# Patient Record
Sex: MALE | Race: WHITE | NOT HISPANIC OR LATINO | Employment: OTHER | URBAN - METROPOLITAN AREA
[De-identification: names, ages, dates, MRNs, and addresses within clinical notes are randomized per-mention and may not be internally consistent; named-entity substitution may affect disease eponyms.]

---

## 2018-04-12 LAB
INR PPP: 2.14 (ref 0.9–1.5)
PROTHROMBIN TIME (HISTORICAL): 25.2 SEC (ref 10.1–12.9)

## 2018-05-18 LAB
INR PPP: 2.27 (ref 0.9–1.5)
PROTHROMBIN TIME (HISTORICAL): 26.8 SEC (ref 10.1–12.9)

## 2018-06-18 LAB
25(OH)D3 SERPL-MCNC: 31.26 NG/ML
BASOPHILS # BLD AUTO: 0 X3/UL (ref 0–0.3)
BASOPHILS # BLD AUTO: 0.7 % (ref 0–2)
DEPRECATED RDW RBC AUTO: 15.4 % (ref 11.5–14.5)
EOSINOPHIL # BLD AUTO: 0.3 X3/UL (ref 0–0.5)
EOSINOPHIL NFR BLD AUTO: 5.1 % (ref 0–5)
HCT VFR BLD AUTO: 41.8 % (ref 42–52)
HGB BLD-MCNC: 14.1 G/DL (ref 14–18)
INR PPP: 2.14 (ref 0.9–1.5)
LYMPHOCYTES # BLD AUTO: 1.7 X3/UL (ref 1.2–4.2)
LYMPHOCYTES NFR BLD AUTO: 24.7 % (ref 20.5–51.1)
MCH RBC QN AUTO: 30.2 PG (ref 26–34)
MCHC RBC AUTO-ENTMCNC: 33.7 G/DL (ref 31–36)
MCV RBC AUTO: 89.5 FL (ref 81–99)
MONOCYTES # BLD AUTO: 0.8 X3/UL (ref 0–1)
MONOCYTES NFR BLD AUTO: 11.1 % (ref 1.7–12)
NEUTROPHILS # BLD AUTO: 4 X3/UL (ref 1.4–6.5)
NEUTS SEG NFR BLD AUTO: 58.4 % (ref 42.2–75.2)
PLATELET # BLD AUTO: 216 X3/UL (ref 130–400)
PMV BLD AUTO: 7.9 FL (ref 8.6–11.7)
PROTHROMBIN TIME (HISTORICAL): 25.2 SEC (ref 10.1–12.9)
RBC # BLD AUTO: 4.67 X6/UL (ref 4.3–5.9)
WBC # BLD AUTO: 6.9 X3/UL (ref 4.8–10.8)

## 2018-06-28 ENCOUNTER — TRANSCRIBE ORDERS (OUTPATIENT)
Dept: ADMINISTRATIVE | Facility: HOSPITAL | Age: 83
End: 2018-06-28

## 2018-06-28 ENCOUNTER — LAB (OUTPATIENT)
Dept: LAB | Facility: HOSPITAL | Age: 83
End: 2018-06-28
Payer: MEDICARE

## 2018-06-28 DIAGNOSIS — D68.9 COAGULOPATHY (HCC): Primary | ICD-10-CM

## 2018-06-28 DIAGNOSIS — D68.9 COAGULOPATHY (HCC): ICD-10-CM

## 2018-06-28 LAB
INR PPP: 1.45 (ref 0.9–1.5)
PROTHROMBIN TIME: 16.9 SECONDS (ref 10.1–12.9)

## 2018-06-28 PROCEDURE — 36415 COLL VENOUS BLD VENIPUNCTURE: CPT

## 2018-06-28 PROCEDURE — 85610 PROTHROMBIN TIME: CPT

## 2018-07-11 ENCOUNTER — TRANSCRIBE ORDERS (OUTPATIENT)
Dept: ADMINISTRATIVE | Facility: HOSPITAL | Age: 83
End: 2018-07-11

## 2018-07-11 DIAGNOSIS — S83.242A TEAR OF MEDIAL MENISCUS OF LEFT KNEE, UNSPECIFIED TEAR TYPE, UNSPECIFIED WHETHER OLD OR CURRENT TEAR, INITIAL ENCOUNTER: Primary | ICD-10-CM

## 2018-07-16 ENCOUNTER — HOSPITAL ENCOUNTER (OUTPATIENT)
Dept: MRI IMAGING | Facility: HOSPITAL | Age: 83
Discharge: HOME | End: 2018-07-16
Attending: ORTHOPAEDIC SURGERY
Payer: MEDICARE

## 2018-07-16 DIAGNOSIS — S83.242A TEAR OF MEDIAL MENISCUS OF LEFT KNEE, UNSPECIFIED TEAR TYPE, UNSPECIFIED WHETHER OLD OR CURRENT TEAR, INITIAL ENCOUNTER: ICD-10-CM

## 2018-07-16 PROCEDURE — 73721 MRI JNT OF LWR EXTRE W/O DYE: CPT

## 2018-08-01 ENCOUNTER — APPOINTMENT (OUTPATIENT)
Dept: LAB | Facility: HOSPITAL | Age: 83
End: 2018-08-01
Attending: ORTHOPAEDIC SURGERY
Payer: MEDICARE

## 2018-08-01 ENCOUNTER — HOSPITAL ENCOUNTER (OUTPATIENT)
Dept: RADIOLOGY | Facility: HOSPITAL | Age: 83
Discharge: HOME | End: 2018-08-01
Attending: ORTHOPAEDIC SURGERY
Payer: MEDICARE

## 2018-08-01 ENCOUNTER — HOSPITAL ENCOUNTER (OUTPATIENT)
Dept: NON INVASIVE DIAGNOSTICS | Facility: HOSPITAL | Age: 83
Discharge: HOME | End: 2018-08-01
Attending: ORTHOPAEDIC SURGERY
Payer: MEDICARE

## 2018-08-01 ENCOUNTER — TRANSCRIBE ORDERS (OUTPATIENT)
Dept: ADMINISTRATIVE | Facility: HOSPITAL | Age: 83
End: 2018-08-01

## 2018-08-01 DIAGNOSIS — S83.282A TEAR OF LATERAL MENISCUS OF LEFT KNEE, UNSPECIFIED TEAR TYPE, UNSPECIFIED WHETHER OLD OR CURRENT TEAR, INITIAL ENCOUNTER: ICD-10-CM

## 2018-08-01 DIAGNOSIS — S83.282A TEAR OF LATERAL MENISCUS OF LEFT KNEE, UNSPECIFIED TEAR TYPE, UNSPECIFIED WHETHER OLD OR CURRENT TEAR, INITIAL ENCOUNTER: Primary | ICD-10-CM

## 2018-08-01 LAB
ANION GAP SERPL CALCULATED.3IONS-SCNC: 7 MMOL/L (ref 4–13)
APTT PPP: 54 SECONDS (ref 24–36)
ATRIAL RATE: 64 BPM
BASOPHILS # BLD AUTO: 0 THOUSANDS/ΜL (ref 0–0.1)
BASOPHILS NFR BLD AUTO: 1 % (ref 0–2)
BILIRUB UR QL STRIP: NEGATIVE
BUN SERPL-MCNC: 16 MG/DL (ref 7–25)
CALCIUM SERPL-MCNC: 9.4 MG/DL (ref 8.6–10.5)
CHLORIDE SERPL-SCNC: 102 MMOL/L (ref 98–107)
CLARITY UR: ABNORMAL
CO2 SERPL-SCNC: 28 MMOL/L (ref 21–31)
COLOR UR: YELLOW
CREAT SERPL-MCNC: 0.99 MG/DL (ref 0.7–1.3)
EOSINOPHIL # BLD AUTO: 0.5 THOUSAND/ΜL (ref 0–0.61)
EOSINOPHIL NFR BLD AUTO: 6 % (ref 0–5)
ERYTHROCYTE [DISTWIDTH] IN BLOOD BY AUTOMATED COUNT: 14.7 % (ref 11.5–14.5)
GFR SERPL CREATININE-BSD FRML MDRD: 68 ML/MIN/1.73SQ M
GLUCOSE P FAST SERPL-MCNC: 98 MG/DL (ref 65–99)
GLUCOSE UR STRIP-MCNC: NEGATIVE MG/DL
HCT VFR BLD AUTO: 41 % (ref 36.5–49.3)
HGB BLD-MCNC: 13.7 G/DL (ref 14–18)
HGB UR QL STRIP.AUTO: NEGATIVE
INR PPP: 4.39 (ref 0.9–1.5)
KETONES UR STRIP-MCNC: ABNORMAL MG/DL
LEUKOCYTE ESTERASE UR QL STRIP: NEGATIVE
LYMPHOCYTES # BLD AUTO: 1.8 THOUSANDS/ΜL (ref 0.6–4.47)
LYMPHOCYTES NFR BLD AUTO: 23 % (ref 21–51)
MCH RBC QN AUTO: 30.1 PG (ref 26–34)
MCHC RBC AUTO-ENTMCNC: 33.5 G/DL (ref 31–37)
MCV RBC AUTO: 90 FL (ref 81–99)
MONOCYTES # BLD AUTO: 0.7 THOUSAND/ΜL (ref 0.17–1.22)
MONOCYTES NFR BLD AUTO: 9 % (ref 2–12)
NEUTROPHILS # BLD AUTO: 4.9 THOUSANDS/ΜL (ref 1.4–6.5)
NEUTS SEG NFR BLD AUTO: 62 % (ref 42–75)
NITRITE UR QL STRIP: NEGATIVE
NRBC BLD AUTO-RTO: 0 /100 WBCS
P AXIS: 76 DEGREES
PH UR STRIP.AUTO: 5.5 [PH] (ref 5–8)
PLATELET # BLD AUTO: 226 THOUSANDS/UL (ref 149–390)
PMV BLD AUTO: 7.5 FL (ref 8.6–11.7)
POTASSIUM SERPL-SCNC: 3.9 MMOL/L (ref 3.5–5.5)
PR INTERVAL: 266 MS
PROT UR STRIP-MCNC: NEGATIVE MG/DL
PROTHROMBIN TIME: 52.5 SECONDS (ref 10.1–12.9)
QRS AXIS: -23 DEGREES
QRSD INTERVAL: 94 MS
QT INTERVAL: 428 MS
QTC INTERVAL: 441 MS
RBC # BLD AUTO: 4.55 MILLION/UL (ref 4.3–5.9)
SODIUM SERPL-SCNC: 137 MMOL/L (ref 134–143)
SP GR UR STRIP.AUTO: 1.02 (ref 1–1.03)
T WAVE AXIS: 30 DEGREES
UROBILINOGEN UR QL STRIP.AUTO: 0.2 E.U./DL
VENTRICULAR RATE: 64 BPM
WBC # BLD AUTO: 7.8 THOUSAND/UL (ref 4.8–10.8)

## 2018-08-01 PROCEDURE — 81003 URINALYSIS AUTO W/O SCOPE: CPT | Performed by: ORTHOPAEDIC SURGERY

## 2018-08-01 PROCEDURE — 85610 PROTHROMBIN TIME: CPT

## 2018-08-01 PROCEDURE — 93005 ELECTROCARDIOGRAM TRACING: CPT

## 2018-08-01 PROCEDURE — 87086 URINE CULTURE/COLONY COUNT: CPT

## 2018-08-01 PROCEDURE — 85730 THROMBOPLASTIN TIME PARTIAL: CPT

## 2018-08-01 PROCEDURE — 85025 COMPLETE CBC W/AUTO DIFF WBC: CPT

## 2018-08-01 PROCEDURE — 80048 BASIC METABOLIC PNL TOTAL CA: CPT

## 2018-08-01 PROCEDURE — 71046 X-RAY EXAM CHEST 2 VIEWS: CPT

## 2018-08-01 PROCEDURE — 36415 COLL VENOUS BLD VENIPUNCTURE: CPT

## 2018-08-02 LAB — BACTERIA UR CULT: NORMAL

## 2018-08-13 RX ORDER — WARFARIN SODIUM 5 MG/1
5 TABLET ORAL DAILY
Status: ON HOLD | COMMUNITY
End: 2019-01-30

## 2018-08-13 RX ORDER — PREDNISOLONE ACETATE 10 MG/ML
1 SUSPENSION/ DROPS OPHTHALMIC DAILY
COMMUNITY

## 2018-08-13 RX ORDER — AMLODIPINE BESYLATE 10 MG/1
10 TABLET ORAL DAILY
COMMUNITY

## 2018-08-13 RX ORDER — ATROPINE SULFATE 10 MG/ML
1 SOLUTION/ DROPS OPHTHALMIC DAILY
COMMUNITY

## 2018-08-13 RX ORDER — TIMOLOL MALEATE 5 MG/ML
1 SOLUTION/ DROPS OPHTHALMIC 2 TIMES DAILY
COMMUNITY

## 2018-08-13 RX ORDER — ALENDRONATE SODIUM 70 MG/1
70 TABLET ORAL WEEKLY
Status: ON HOLD | COMMUNITY
End: 2018-08-15 | Stop reason: ALTCHOICE

## 2018-08-13 RX ORDER — NIACIN 750 MG/1
1500 TABLET, EXTENDED RELEASE ORAL
COMMUNITY
End: 2019-10-16 | Stop reason: HOSPADM

## 2018-08-13 RX ORDER — ATORVASTATIN CALCIUM 40 MG/1
40 TABLET, FILM COATED ORAL DAILY
COMMUNITY

## 2018-08-13 NOTE — PRE-PROCEDURE INSTRUCTIONS
Pre-Surgery Instructions:   Medication Instructions    timolol (TIMOPTIC) 0 5 % ophthalmic solution Instructed patient per Anesthesia Guidelines

## 2018-08-15 ENCOUNTER — HOSPITAL ENCOUNTER (OUTPATIENT)
Facility: HOSPITAL | Age: 83
Setting detail: OUTPATIENT SURGERY
Discharge: HOME | End: 2018-08-15
Attending: ORTHOPAEDIC SURGERY | Admitting: ORTHOPAEDIC SURGERY
Payer: MEDICARE

## 2018-08-15 ENCOUNTER — ANESTHESIA EVENT (OUTPATIENT)
Dept: PERIOP | Facility: HOSPITAL | Age: 83
End: 2018-08-15
Payer: MEDICARE

## 2018-08-15 ENCOUNTER — ANESTHESIA (OUTPATIENT)
Dept: PERIOP | Facility: HOSPITAL | Age: 83
End: 2018-08-15
Payer: MEDICARE

## 2018-08-15 VITALS
BODY MASS INDEX: 28.25 KG/M2 | HEART RATE: 80 BPM | RESPIRATION RATE: 18 BRPM | OXYGEN SATURATION: 93 % | WEIGHT: 180 LBS | SYSTOLIC BLOOD PRESSURE: 124 MMHG | TEMPERATURE: 96.8 F | HEIGHT: 67 IN | DIASTOLIC BLOOD PRESSURE: 62 MMHG

## 2018-08-15 DIAGNOSIS — S83.242D ACUTE MEDIAL MENISCUS TEAR OF LEFT KNEE, SUBSEQUENT ENCOUNTER: Primary | ICD-10-CM

## 2018-08-15 DIAGNOSIS — S83.272D COMPLEX TEAR OF LATERAL MENISCUS, CURRENT INJURY, LEFT KNEE, SUBSEQUENT ENCOUNTER: ICD-10-CM

## 2018-08-15 LAB
APTT PPP: 30 SECONDS (ref 24–36)
INR PPP: 1 (ref 0.9–1.5)
PROTHROMBIN TIME: 11.6 SECONDS (ref 10.1–12.9)

## 2018-08-15 PROCEDURE — 85730 THROMBOPLASTIN TIME PARTIAL: CPT | Performed by: ORTHOPAEDIC SURGERY

## 2018-08-15 PROCEDURE — 85610 PROTHROMBIN TIME: CPT | Performed by: ORTHOPAEDIC SURGERY

## 2018-08-15 PROCEDURE — 88304 TISSUE EXAM BY PATHOLOGIST: CPT | Performed by: PATHOLOGY

## 2018-08-15 RX ORDER — BUPIVACAINE HYDROCHLORIDE AND EPINEPHRINE 5; 5 MG/ML; UG/ML
INJECTION, SOLUTION PERINEURAL AS NEEDED
Status: DISCONTINUED | OUTPATIENT
Start: 2018-08-15 | End: 2018-08-15 | Stop reason: HOSPADM

## 2018-08-15 RX ORDER — MAGNESIUM HYDROXIDE 1200 MG/15ML
LIQUID ORAL AS NEEDED
Status: DISCONTINUED | OUTPATIENT
Start: 2018-08-15 | End: 2018-08-15 | Stop reason: HOSPADM

## 2018-08-15 RX ORDER — SODIUM CHLORIDE, SODIUM LACTATE, POTASSIUM CHLORIDE, CALCIUM CHLORIDE 600; 310; 30; 20 MG/100ML; MG/100ML; MG/100ML; MG/100ML
50 INJECTION, SOLUTION INTRAVENOUS CONTINUOUS
Status: DISCONTINUED | OUTPATIENT
Start: 2018-08-15 | End: 2018-08-15 | Stop reason: HOSPADM

## 2018-08-15 RX ORDER — ONDANSETRON 2 MG/ML
4 INJECTION INTRAMUSCULAR; INTRAVENOUS ONCE AS NEEDED
Status: DISCONTINUED | OUTPATIENT
Start: 2018-08-15 | End: 2018-08-15 | Stop reason: HOSPADM

## 2018-08-15 RX ORDER — PROPOFOL 10 MG/ML
INJECTION, EMULSION INTRAVENOUS AS NEEDED
Status: DISCONTINUED | OUTPATIENT
Start: 2018-08-15 | End: 2018-08-15 | Stop reason: SURG

## 2018-08-15 RX ORDER — METHYLPREDNISOLONE ACETATE 40 MG/ML
INJECTION, SUSPENSION INTRA-ARTICULAR; INTRALESIONAL; INTRAMUSCULAR; SOFT TISSUE AS NEEDED
Status: DISCONTINUED | OUTPATIENT
Start: 2018-08-15 | End: 2018-08-15 | Stop reason: HOSPADM

## 2018-08-15 RX ORDER — ONDANSETRON 2 MG/ML
INJECTION INTRAMUSCULAR; INTRAVENOUS AS NEEDED
Status: DISCONTINUED | OUTPATIENT
Start: 2018-08-15 | End: 2018-08-15 | Stop reason: SURG

## 2018-08-15 RX ORDER — LIDOCAINE HYDROCHLORIDE 10 MG/ML
INJECTION, SOLUTION INFILTRATION; PERINEURAL AS NEEDED
Status: DISCONTINUED | OUTPATIENT
Start: 2018-08-15 | End: 2018-08-15 | Stop reason: SURG

## 2018-08-15 RX ORDER — HYDROCODONE BITARTRATE AND ACETAMINOPHEN 5; 325 MG/1; MG/1
1 TABLET ORAL EVERY 6 HOURS PRN
Status: DISCONTINUED | OUTPATIENT
Start: 2018-08-15 | End: 2018-08-15 | Stop reason: HOSPADM

## 2018-08-15 RX ORDER — POVIDONE-IODINE 10 MG/G
OINTMENT TOPICAL AS NEEDED
Status: DISCONTINUED | OUTPATIENT
Start: 2018-08-15 | End: 2018-08-15 | Stop reason: HOSPADM

## 2018-08-15 RX ORDER — ONDANSETRON 2 MG/ML
4 INJECTION INTRAMUSCULAR; INTRAVENOUS EVERY 6 HOURS PRN
Status: DISCONTINUED | OUTPATIENT
Start: 2018-08-15 | End: 2018-08-15 | Stop reason: HOSPADM

## 2018-08-15 RX ORDER — FENTANYL CITRATE 50 UG/ML
INJECTION, SOLUTION INTRAMUSCULAR; INTRAVENOUS AS NEEDED
Status: DISCONTINUED | OUTPATIENT
Start: 2018-08-15 | End: 2018-08-15 | Stop reason: SURG

## 2018-08-15 RX ORDER — CEPHALEXIN 500 MG/1
500 CAPSULE ORAL EVERY 6 HOURS SCHEDULED
Refills: 0
Start: 2018-08-15 | End: 2018-08-18

## 2018-08-15 RX ORDER — LOSARTAN POTASSIUM 50 MG/1
TABLET ORAL DAILY
COMMUNITY

## 2018-08-15 RX ORDER — SODIUM CHLORIDE, SODIUM LACTATE, POTASSIUM CHLORIDE, CALCIUM CHLORIDE 600; 310; 30; 20 MG/100ML; MG/100ML; MG/100ML; MG/100ML
125 INJECTION, SOLUTION INTRAVENOUS CONTINUOUS
Status: DISCONTINUED | OUTPATIENT
Start: 2018-08-15 | End: 2018-08-15

## 2018-08-15 RX ORDER — EPHEDRINE SULFATE 50 MG/ML
INJECTION, SOLUTION INTRAVENOUS AS NEEDED
Status: DISCONTINUED | OUTPATIENT
Start: 2018-08-15 | End: 2018-08-15 | Stop reason: SURG

## 2018-08-15 RX ADMIN — FENTANYL CITRATE 50 MCG: 50 INJECTION INTRAMUSCULAR; INTRAVENOUS at 13:40

## 2018-08-15 RX ADMIN — CEFAZOLIN SODIUM 2000 MG: 2 SOLUTION INTRAVENOUS at 13:25

## 2018-08-15 RX ADMIN — LIDOCAINE HYDROCHLORIDE 60 MG: 10 INJECTION, SOLUTION INFILTRATION; PERINEURAL at 13:33

## 2018-08-15 RX ADMIN — EPHEDRINE SULFATE 5 MG: 50 INJECTION INTRAMUSCULAR; INTRAVENOUS; SUBCUTANEOUS at 13:51

## 2018-08-15 RX ADMIN — PROPOFOL 100 MG: 10 INJECTION, EMULSION INTRAVENOUS at 13:33

## 2018-08-15 RX ADMIN — PROPOFOL 50 MG: 10 INJECTION, EMULSION INTRAVENOUS at 13:34

## 2018-08-15 RX ADMIN — FENTANYL CITRATE 25 MCG: 50 INJECTION INTRAMUSCULAR; INTRAVENOUS at 14:05

## 2018-08-15 RX ADMIN — EPHEDRINE SULFATE 10 MG: 50 INJECTION INTRAMUSCULAR; INTRAVENOUS; SUBCUTANEOUS at 13:44

## 2018-08-15 RX ADMIN — FENTANYL CITRATE 25 MCG: 50 INJECTION INTRAMUSCULAR; INTRAVENOUS at 14:21

## 2018-08-15 RX ADMIN — SODIUM CHLORIDE, SODIUM LACTATE, POTASSIUM CHLORIDE, AND CALCIUM CHLORIDE: .6; .31; .03; .02 INJECTION, SOLUTION INTRAVENOUS at 12:50

## 2018-08-15 RX ADMIN — ONDANSETRON HYDROCHLORIDE 4 MG: 2 INJECTION, SOLUTION INTRAVENOUS at 14:08

## 2018-08-15 NOTE — OP NOTE
OPERATIVE REPORT  PATIENT NAME: Kamari Roa    :  3/5/1931  MRN: [id number]  Pt Location: [facility] OR ROOM 02    SURGERY DATE: 8/15/2018    Surgeon(s) and Role:     * Yazmin Purcell DO - Primary     * Salty Dillon PA-C - Assisting    Preop Diagnosis:  Complex tear of lateral meniscus, current injury, left knee, subsequent encounter [S83 272D]    Post-Op Diagnosis Codes:     * Complex tear of lateral meniscus, current injury, left knee, subsequent encounter [S83 272D]  Complex tear of lateral meniscus  Synovitis  Degenerative joint disease     Procedure(s) (LRB):  KNEE ARTHROSCOPY (Left)   Medial meniscectomy  Lateral meniscectomy  Chondroplasty  Synovectomy  Post arthroscopic injection of intra-articular joint space and peripheral portals    Specimen(s):  shavings    Estimated Blood Loss:   Minimal    Drains: none       Anesthesia Type:   LMA    Operative Indications:  Complex tear of lateral meniscus, current injury, left knee, subsequent encounter [S83 272D]      Operative Findings:  TT: 16    Complications:   None    Procedure and Technique:    The patient was properly identified and brought into the office operative suite  After  Successful induction of the general anesthetic, a tourniquet was placed on patient's left proximal thigh  The left lower extremity was then prepped and draped in the usual fashion  It is medically necessary that the physician's assistant be in the room to aid in positioning and placing  the appropriate amount of retraction on  the patient  The patient was also given a dose of intravenous antibiotics      An Esmarch was used to exsanguinate the left lower extremity  The tourniquet was then inflated  Using a 11  Scalpel blade an anterior lateral portal was made approximately 1 cm above the joint line 1 cm lateral to patellar tendon and an anterior medial portal was made approximately 1 cm above the joint line 1 cm medial to patellar tendon    The arthroscope was 1st introduced into the  into the lateral portal   First the  medial joint space was inspected  There was a tremendous amount of synovial tissue  A synovectomy did occur with these with assistance of a shaver  There was a complex tear along the central and posterior 1/3 horn of the medial meniscus  There was grade 3 arthritic changes along the medial femoral condyle  Using an an arthroscopic biter, a medial meniscectomy  occurred  It was then smoothed out with shaver  A Chondroplasty did occur where there was rough articular cartilage  It was then confirmed with a probe that there was no further loosening of the meniscus and articular cartilage  The arthroscope was then introduced in the lateral side of her knee  After a synovectomy was performed, there was a complex tear along the lateral meniscus  Using arthroscopic shaver a lateral meniscectomy did occur  It was then confirmed with a probe that there is no further loosening of the meniscal tissue  There is grade 3 arthritis on the lateral side of his knee where a chondroplasty occurred  The arthroscope was then induced the patellofemoral joint  There was some grade 2 and grade 3 early arthritic changes along the undersurface of the patella  Using arthroscopic shaver a chondroplasty to occur smoothing the articular cartilage down to a nice space  The medial and lateral gutters were inspected next and there was no loose bodies  The knee was then copiously irrigated sterile arthroscopic solution  The portals were closed with 3 O nylon  The portals were then injected with 0 5% Marcaine with epinephrine, and the knee was injected with 0 5% Marcaine with epinephrine and Depo-Medrol  The wounds were then dressed with ointment Xeroform 4x4s sterile Webril and Ace bandage  The tourniquet was then deflated  There is no complications during procedure  She was successfully woken transferred her hospital bed in with recurrent stable condition            I was present for the entire procedure    Patient Disposition:  PACU     SIGNATURE: Ryan Han,   DATE: August 15, 2018  TIME: 1:39 PM

## 2018-08-15 NOTE — ANESTHESIA PREPROCEDURE EVALUATION
Review of Systems/Medical History  Patient summary reviewed  Chart reviewed      Cardiovascular  EKG reviewed, Exercise tolerance (METS): >4,  Hyperlipidemia, Hypertension , CAD , CAD status: 1VD, History of CABG, Cardiac stents > 1 year    Pulmonary       GI/Hepatic            Endo/Other     GYN       Hematology   Musculoskeletal       Neurology   Psychology           Physical Exam    Airway       Dental   upper dentures,     Cardiovascular  Rhythm: regular, Rate: normal,     Pulmonary  Breath sounds clear to auscultation,     Other Findings        Anesthesia Plan  ASA Score- 3     Anesthesia Type- general with ASA Monitors  Additional Monitors:   Airway Plan: LMA  Comment: Took BP Meds today  Plan Factors-    Induction-     Postoperative Plan-     Informed Consent- Anesthetic plan and risks discussed with patient

## 2018-08-15 NOTE — ANESTHESIA POSTPROCEDURE EVALUATION
Post-Op Assessment Note      CV Status:  Stable    Mental Status:  Alert    Hydration Status:  Stable    PONV Controlled:  None    Airway Patency:  Patent    Post Op Vitals Reviewed: Yes          Staff: CRNA           BP   131/67   Temp   98 0   Pulse  75   Resp   12   SpO2   99

## 2018-08-17 ENCOUNTER — EVALUATION (OUTPATIENT)
Dept: PHYSICAL THERAPY | Facility: CLINIC | Age: 83
End: 2018-08-17
Payer: MEDICARE

## 2018-08-17 ENCOUNTER — TRANSCRIBE ORDERS (OUTPATIENT)
Dept: PHYSICAL THERAPY | Facility: CLINIC | Age: 83
End: 2018-08-17

## 2018-08-17 VITALS — DIASTOLIC BLOOD PRESSURE: 68 MMHG | SYSTOLIC BLOOD PRESSURE: 137 MMHG | HEART RATE: 78 BPM

## 2018-08-17 DIAGNOSIS — Z98.890 S/P LEFT KNEE ARTHROSCOPY: Primary | ICD-10-CM

## 2018-08-17 PROCEDURE — G8990 OTHER PT/OT CURRENT STATUS: HCPCS | Performed by: PHYSICAL THERAPIST

## 2018-08-17 PROCEDURE — 97535 SELF CARE MNGMENT TRAINING: CPT | Performed by: PHYSICAL THERAPIST

## 2018-08-17 PROCEDURE — 97140 MANUAL THERAPY 1/> REGIONS: CPT | Performed by: PHYSICAL THERAPIST

## 2018-08-17 PROCEDURE — G8991 OTHER PT/OT GOAL STATUS: HCPCS | Performed by: PHYSICAL THERAPIST

## 2018-08-17 PROCEDURE — 97110 THERAPEUTIC EXERCISES: CPT | Performed by: PHYSICAL THERAPIST

## 2018-08-17 PROCEDURE — 97161 PT EVAL LOW COMPLEX 20 MIN: CPT | Performed by: PHYSICAL THERAPIST

## 2018-08-17 NOTE — PROGRESS NOTES
PT Evaluation     Today's date: 2018  Patient name: Elif Little  : 3/5/1931  MRN: [id number]  Referring provider: Brett Luevano DO  Dx:   Encounter Diagnosis     ICD-10-CM    1  S/P left knee arthroscopy Z98 890                   Assessment  Impairments: abnormal gait, abnormal or restricted ROM, activity intolerance and impaired physical strength    Assessment details: Elif Little is a 80 y o  male who presents to outpatient PT with a  S/P left knee arthroscopy  (primary encounter diagnosis)  No further referral appears necessary at this time based upon examination results  Pt presents with decreased strength, ROM, balance, functional activity tolerance, and pain with movement in his left knee,  which is  limiting his ability to perform the aforementioned functional activities, Pt ambulates with an antalgic gait pattern and a RW at the time of his initial evaluation  Etiologic factors include repetitive poor body mechanics, and recent knee surgery  Prognosis is good given HEP compliance and PT 2-3/wk  Please contact me if you have any questions or recommendations  Thank you for the opportunity to share in  [address] [facility],[address] care  Understanding of Dx/Px/POC: good   Prognosis: good    Goals  1  In 4-6 weeks, patient will demonstrate a decrease in pain to 0/10 during functional activities  2  In 4-6 weeks, patient will demonstrate an increase in range of motion by 15 degrees  3  In 4-6 weeks, patient will demonstrate an increase in strength by 1/2 grade on MMT    1  In 6-8 weeks, patient will be independent with their home exercise program  2  In 6-8 weeks, patient will have zero limitations with strength  3  In 6-8 weeks, patient will have zero limitations with ROM  4  In 6-8 weeks, patient will have zero limitations with ambulation  5    In 6-8 weeks, patient will have zero limitations with stair negotiation    Plan  Patient would benefit from: skilled physical therapy  Planned therapy interventions: manual therapy, therapeutic exercise and home exercise program  Frequency: 3x week  Duration in weeks: 4  Treatment plan discussed with: patient  Plan details: Patient was provided a home exercise program and demonstrated an understanding of exercises  Patient was advised to stop performing home exercise program if symptoms increase or new complaints developed  Verbal understanding demonstrated regarding home exercise program instructions  Surgical incision(s) inspected  Incision(s) clean, dry and intact with no signs/symptoms of infection  Patient was educated on signs/symptoms of infection and was advised to contact MD immediately if suspect infection  Subjective Evaluation    History of Present Illness  Date of surgery: 8/15/2018  Mechanism of injury: The patient reports that he had surgery on his knee on 8/15, to clean out some arthritis, and repair a ligament that was torn     Pain  Current pain ratin  At best pain ratin  At worst pain rating: 3  Quality: sharp and knife-like  Relieving factors: medications  Aggravating factors: standing, walking and stair climbing    Social Support  Steps to enter house: no  Stairs in house: no   Lives in: [facility]  Lives with: alone    Employment status: not working  Hand dominance: right    Treatments  Previous treatment: medication  Current treatment: medication and physical therapy  Patient Goals  Patient goals for therapy: decreased pain, increased strength and increased motion          Objective     Active Range of Motion   Left Knee   Flexion: 85 degrees   Extension: -5 degrees     Right Knee   Flexion: 120 degrees   Extension: 0 degrees     Strength/Myotome Testing     Left Knee   Left knee flexion strength: NT   Left knee extension strength: NT     Right Knee   Flexion: 4+  Extension: 4+      Flowsheet Rows      Most Recent Value   PT/OT G-Codes   Current Score  33   Projected Score  56          Precautions:     Daily Treatment Diary     Manual  8/17            L HS              L Hip Flexor              L Gastroc             L Knee PROM flex/ext to tolerance               15'                Exercise Diary  8/17            Nu Step 10 min            Quad Set              Hip Add with Ball             Clamshell             Bridges              SLR 4 way              Step Up              SLS             HR/TR              Lunges              CP with LLLDE                                                                                                                                      Modalities

## 2018-08-17 NOTE — LETTER
2018    Juan Valdez DO  [address]  [address]  [facility] [address]    Patient: Tika Khan   YOB: 1931   Date of Visit: 2018     Encounter Diagnosis     ICD-10-CM    1  S/P left knee arthroscopy Z98 890        Dear Dr Mannie Martinez:    Please review the attached Plan of Care from [address] [facility] [facility]'s recent visit  Please verify that you agree therapy should continue by signing the attached document and sending it back to our office  If you have any questions or concerns, please don't hesitate to call  Sincerely,    Alfonso Hernández PT      Referring Provider:      I certify that I have read the below Plan of Care and certify the need for these services furnished under this plan of treatment while under my care  Juan Valdez DO  [address]  [address]: [phone]          PT Evaluation     Today's date: 2018  Patient name: Tika Khan  : 3/5/1931  MRN: [id number]  Referring provider: Sam Gramajo DO  Dx:   Encounter Diagnosis     ICD-10-CM    1  S/P left knee arthroscopy Z98 890                   Assessment  Impairments: abnormal gait, abnormal or restricted ROM, activity intolerance and impaired physical strength    Assessment details: Tika Khan is a 80 y o  male who presents to outpatient PT with a  S/P left knee arthroscopy  (primary encounter diagnosis)  No further referral appears necessary at this time based upon examination results  Pt presents with decreased strength, ROM, balance, functional activity tolerance, and pain with movement in his left knee,  which is  limiting his ability to perform the aforementioned functional activities, Pt ambulates with an antalgic gait pattern and a RW at the time of his initial evaluation  Etiologic factors include repetitive poor body mechanics, and recent knee surgery  Prognosis is good given HEP compliance and PT 2-3/wk  Please contact me if you have any questions or recommendations  Thank you for the opportunity to share in  [address] [facility],[address] care  Understanding of Dx/Px/POC: good   Prognosis: good    Goals  1  In 4-6 weeks, patient will demonstrate a decrease in pain to 0/10 during functional activities  2  In 4-6 weeks, patient will demonstrate an increase in range of motion by 15 degrees  3  In 4-6 weeks, patient will demonstrate an increase in strength by 1/2 grade on MMT    1  In 6-8 weeks, patient will be independent with their home exercise program  2  In 6-8 weeks, patient will have zero limitations with strength  3  In 6-8 weeks, patient will have zero limitations with ROM  4  In 6-8 weeks, patient will have zero limitations with ambulation  5  In 6-8 weeks, patient will have zero limitations with stair negotiation    Plan  Patient would benefit from: skilled physical therapy  Planned therapy interventions: manual therapy, therapeutic exercise and home exercise program  Frequency: 3x week  Duration in weeks: 4  Treatment plan discussed with: patient  Plan details: Patient was provided a home exercise program and demonstrated an understanding of exercises  Patient was advised to stop performing home exercise program if symptoms increase or new complaints developed  Verbal understanding demonstrated regarding home exercise program instructions  Surgical incision(s) inspected  Incision(s) clean, dry and intact with no signs/symptoms of infection  Patient was educated on signs/symptoms of infection and was advised to contact MD immediately if suspect infection  Subjective Evaluation    History of Present Illness  Date of surgery: 8/15/2018  Mechanism of injury: The patient reports that he had surgery on his knee on 8/15, to clean out some arthritis, and repair a ligament that was torn     Pain  Current pain ratin  At best pain ratin  At worst pain rating: 3  Quality: sharp and knife-like  Relieving factors: medications  Aggravating factors: standing, walking and stair climbing    Social Support  Steps to enter house: no  Stairs in house: no   Lives in: [facility]  Lives with: alone    Employment status: not working  Hand dominance: right    Treatments  Previous treatment: medication  Current treatment: medication and physical therapy  Patient Goals  Patient goals for therapy: decreased pain, increased strength and increased motion          Objective     Active Range of Motion   Left Knee   Flexion: 85 degrees   Extension: -5 degrees     Right Knee   Flexion: 120 degrees   Extension: 0 degrees     Strength/Myotome Testing     Left Knee   Left knee flexion strength: NT   Left knee extension strength: NT     Right Knee   Flexion: 4+  Extension: 4+      Flowsheet Rows      Most Recent Value   PT/OT G-Codes   Current Score  33   Projected Score  56          Precautions:     Daily Treatment Diary     Manual  8/17            L HS              L Hip Flexor              L Gastroc             L Knee PROM flex/ext to tolerance               15'                Exercise Diary  8/17            Nu Step 10 min            Quad Set              Hip Add with Ball             Clamshell             Bridges              SLR 4 way              Step Up              SLS             HR/TR              Lunges              CP with LLLDE                                                                                                                                      Modalities

## 2018-08-20 ENCOUNTER — OFFICE VISIT (OUTPATIENT)
Dept: PHYSICAL THERAPY | Facility: CLINIC | Age: 83
End: 2018-08-20
Payer: MEDICARE

## 2018-08-20 DIAGNOSIS — Z98.890 S/P LEFT KNEE ARTHROSCOPY: Primary | ICD-10-CM

## 2018-08-20 PROCEDURE — 97110 THERAPEUTIC EXERCISES: CPT

## 2018-08-20 PROCEDURE — 97140 MANUAL THERAPY 1/> REGIONS: CPT

## 2018-08-20 NOTE — PROGRESS NOTES
Daily Note     Today's date: 2018  Patient name: Erica Sheppard  : 3/5/1931  MRN: [id number]  Referring provider: Berenice Gutierrez DO  Dx:   Encounter Diagnosis     ICD-10-CM    1  S/P left knee arthroscopy Z98 890                   Subjective: Pt denies all pain and reports that he wants to get back to bowling  Objective: See treatment diary below  Daily Treatment Diary     Manual        L HS         L Hip Flexor         L Gastroc        L Knee PROM flex/ext to tolerance          15' 15'          Exercise Diary        Nu Step 10 min L4 x10'      Heel Slides  10" x10      Quad Set   10" x10      Hip Add with Ball  5" x20      Clamshell  5" x20 Blue TB      Bridges   5" x20      SLR 4 way   3x10 Flex      Step Up         SLS        HR/TR   x30      Lunges         CP with LLLDE                                                                                    Modalities                                     Assessment: Tolerated treatment well  Initiated multiple new exercises as outlined in pt POC  No exacerbation of pain  Patient demonstrated fatigue post treatment, exhibited good technique with therapeutic exercises and would benefit from continued PT      Plan: Continue per plan of care  Progress treatment as tolerated

## 2018-08-22 ENCOUNTER — OFFICE VISIT (OUTPATIENT)
Dept: PHYSICAL THERAPY | Facility: CLINIC | Age: 83
End: 2018-08-22
Payer: MEDICARE

## 2018-08-22 DIAGNOSIS — Z98.890 S/P LEFT KNEE ARTHROSCOPY: Primary | ICD-10-CM

## 2018-08-22 PROCEDURE — 97110 THERAPEUTIC EXERCISES: CPT

## 2018-08-22 PROCEDURE — 97140 MANUAL THERAPY 1/> REGIONS: CPT

## 2018-08-22 NOTE — PROGRESS NOTES
Daily Note     Today's date: 2018  Patient name: Radha Manirquez  : 3/5/1931  MRN: [id number]  Referring provider: Fouzia Watters DO  Dx:   Encounter Diagnosis     ICD-10-CM    1  S/P left knee arthroscopy Z98 890                   Subjective: "I was a little sore after the work out the other day "      Objective: See treatment diary below  Daily Treatment Diary     Manual       L HS         L Hip Flexor         L Gastroc        L Knee PROM flex/ext to tolerance          15' 15' 15'         Exercise Diary       Nu Step 10 min L4 x10' L4 x10'     Heel Slides  10" x10 10" x10     Quad Set   10" x10 10" x10     Hip Add with Ball  5" x20 5" x20     Clamshell  5" x20 Blue TB 5" x20 Blue Tb     Bridges   5" x20 5" x20     SLR 4 way   3x10 Flex 3x10 flex     Step Up    "B" x20 ea     SLS        HR/TR   x30 x30     Lunges         CP with LLLDE   10' with ankle prop                                                                                 Modalities                                   Assessment: Tolerated treatment well  Initiated step ups this date  Patient demonstrated fatigue post treatment, exhibited good technique with therapeutic exercises and would benefit from continued PT  No adverse to CP during LLLD stretch  Plan: Continue per plan of care  Progress treatment as tolerated

## 2018-08-23 ENCOUNTER — APPOINTMENT (OUTPATIENT)
Dept: LAB | Facility: HOSPITAL | Age: 83
End: 2018-08-23
Payer: MEDICARE

## 2018-08-23 ENCOUNTER — TRANSCRIBE ORDERS (OUTPATIENT)
Dept: ADMINISTRATIVE | Facility: HOSPITAL | Age: 83
End: 2018-08-23

## 2018-08-23 DIAGNOSIS — D68.9 COAGULOPATHY (HCC): ICD-10-CM

## 2018-08-23 DIAGNOSIS — D68.9 COAGULOPATHY (HCC): Primary | ICD-10-CM

## 2018-08-23 LAB
INR PPP: 1.55 (ref 0.9–1.5)
PROTHROMBIN TIME: 18.1 SECONDS (ref 10.1–12.9)

## 2018-08-23 PROCEDURE — 85610 PROTHROMBIN TIME: CPT

## 2018-08-23 PROCEDURE — 36415 COLL VENOUS BLD VENIPUNCTURE: CPT

## 2018-08-24 ENCOUNTER — OFFICE VISIT (OUTPATIENT)
Dept: PHYSICAL THERAPY | Facility: CLINIC | Age: 83
End: 2018-08-24
Payer: MEDICARE

## 2018-08-24 DIAGNOSIS — Z98.890 S/P LEFT KNEE ARTHROSCOPY: Primary | ICD-10-CM

## 2018-08-24 PROCEDURE — 97140 MANUAL THERAPY 1/> REGIONS: CPT | Performed by: PHYSICAL THERAPIST

## 2018-08-24 PROCEDURE — 97110 THERAPEUTIC EXERCISES: CPT | Performed by: PHYSICAL THERAPIST

## 2018-08-24 NOTE — PROGRESS NOTES
Daily Note     Today's date: 2018  Patient name: Pipo Ayala  : 3/5/1931  MRN: [id number]  Referring provider: Maikol Bishop DO  Dx:   Encounter Diagnosis     ICD-10-CM    1  S/P left knee arthroscopy Z98 890                   Subjective: " Pt reports that his knee feels good today "       Objective: See treatment diary below    Manual      L HS         L Hip Flexor         L Gastroc        L Knee PROM flex/ext to tolerance          15' 15' 15' 15'        Exercise Diary      Nu Step 10 min L4 x10' L4 x10' L4 x 10    Heel Slides  10" x10 10" x10     Quad Set   10" x10 10" x10 10''10x    Hip Add with Ball  5" x20 5" x20 5''20x    Clamshell  5" x20 Blue TB 5" x20 Blue Tb 5''20x blue    Bridges   5" x20 5" x20 5''20x    SLR 4 way   3x10 Flex 3x10 flex 3x10 flexion    Step Up    "B" x20 ea  B x20    SLS        HR/TR   x30 x30 x30    Lunges         CP with LLLDE   10' with ankle prop 10'     Mini Squat     3x10                                                                        Modalities                                       Assessment: Tolerated treatment well  Patient exhibited good technique with therapeutic exercises  Added mini squats this session, to help with lower extremity strengthening  Plan: Continue per plan of care

## 2018-08-27 ENCOUNTER — OFFICE VISIT (OUTPATIENT)
Dept: PHYSICAL THERAPY | Facility: CLINIC | Age: 83
End: 2018-08-27
Payer: MEDICARE

## 2018-08-27 DIAGNOSIS — Z98.890 S/P LEFT KNEE ARTHROSCOPY: Primary | ICD-10-CM

## 2018-08-27 PROCEDURE — 97140 MANUAL THERAPY 1/> REGIONS: CPT

## 2018-08-27 PROCEDURE — 97150 GROUP THERAPEUTIC PROCEDURES: CPT

## 2018-08-27 NOTE — PROGRESS NOTES
Daily Note     Today's date: 2018  Patient name: Juanjo Betancourt  : 3/5/1931  MRN: [id number]  Referring provider: Alcides Hartman DO  Dx:   Encounter Diagnosis     ICD-10-CM    1  S/P left knee arthroscopy Z98 890                   Subjective: Pt reports that he has a F/u with the Ortho and will get his stitched out on Thursday  Pt denies pain in the L knee  Objective: See treatment diary below    Manual     L HS         L Hip Flexor         L Gastroc        L Knee PROM flex/ext to tolerance          15' 15' 15' 15' 15'       Exercise Diary     Nu Step 10 min L4 x10' L4 x10' L4 x 10 L4x10   Heel Slides  10" x10 10" x10     Quad Set   10" x10 10" x10 10''10x 10"x10   Hip Add with Ball  5" x20 5" x20 5''20x 5"x20   Clamshell  5" x20 Blue TB 5" x20 Blue Tb 5''20x blue 5" x20 blue   Bridges   5" x20 5" x20 5''20x 5" x20   SLR 4 way   3x10 Flex 3x10 flex 3x10 flexion 3x10 flex   Step Up    "B" x20 ea  B x20 "B" x20   SLS        HR/TR   x30 x30 x30 x30   Lunges         CP with LLLDE   10' with ankle prop 10'  Pt defers   Mini Squat     3x10 3x10                                                                       Modalities                                     Assessment: Tolerated treatment well  PROM WNL of the L knee this date  Patient demonstrated fatigue post treatment, exhibited good technique with therapeutic exercises and would benefit from continued PT  Pt defers CP this date  Plan: Continue per plan of care  Progress treatment as tolerated

## 2018-08-29 ENCOUNTER — OFFICE VISIT (OUTPATIENT)
Dept: PHYSICAL THERAPY | Facility: CLINIC | Age: 83
End: 2018-08-29
Payer: MEDICARE

## 2018-08-29 DIAGNOSIS — Z98.890 S/P LEFT KNEE ARTHROSCOPY: Primary | ICD-10-CM

## 2018-08-29 PROCEDURE — 97140 MANUAL THERAPY 1/> REGIONS: CPT

## 2018-08-29 PROCEDURE — 97110 THERAPEUTIC EXERCISES: CPT

## 2018-08-29 NOTE — PROGRESS NOTES
Daily Note     Today's date: 2018  Patient name: Reece Mendoza  : 3/5/1931  MRN: [id number]  Referring provider: Emery Sigala DO  Dx:   Encounter Diagnosis     ICD-10-CM    1  S/P left knee arthroscopy Z98 890                   Subjective: Pt reports that the "stiches are getting itchy but they come out tomorrow "       Objective: See treatment diary below    Manual     L HS         L Hip Flexor         L Gastroc        L Knee PROM flex/ext to tolerance          15' 15' 15' 15' 15'       Exercise Diary     Nu Step L5 x10' L4 x10' L4 x10' L4 x 10 L4x10   Heel Slides  10" x10 10" x10     Quad Set  10" x10 10" x10 10" x10 10''10x 10"x10   Hip Add with Ball 5" x20 5" x20 5" x20 5''20x 5"x20   Clamshell 5" x20 Blue TB 5" x20 Blue TB 5" x20 Blue Tb 5''20x blue 5" x20 blue   Bridges  5" x20 5" x20 5" x20 5''20x 5" x20   SLR 4 way  3x10 Flex 1# 3x10 Flex 3x10 flex 3x10 flexion 3x10 flex   Step Up  "C" x20 ea  "B" x20 ea  B x20 "B" x20   SLS 30" x3 ea       HR/TR  x30 x30 x30 x30 x30   Lunges         CP with LLLDE 10' with ankle prop  10' with ankle prop 10'  Pt defers   Mini Squat  3x10   3x10 3x10   Tmill walking 5' @ 1 3 MPH cueing on proper gait pattern               HS curl NV       Knee extension  NV                                           Modalities                                     Assessment: Tolerated treatment well  Slightly limited in TKE during PROM, resumed LLLD stretch  Initiated Tmill walking to correct gait pattern  Patient demonstrated fatigue post treatment, exhibited good technique with therapeutic exercises and would benefit from continued PT  Pt has no adverse affect to CP this date  Plan: Continue per plan of care  Progress treatment as tolerated  Initiate weighted machine NV

## 2018-08-31 ENCOUNTER — OFFICE VISIT (OUTPATIENT)
Dept: PHYSICAL THERAPY | Facility: CLINIC | Age: 83
End: 2018-08-31
Payer: MEDICARE

## 2018-08-31 DIAGNOSIS — Z98.890 S/P LEFT KNEE ARTHROSCOPY: Primary | ICD-10-CM

## 2018-08-31 PROCEDURE — 97110 THERAPEUTIC EXERCISES: CPT | Performed by: PHYSICAL THERAPIST

## 2018-08-31 PROCEDURE — 97140 MANUAL THERAPY 1/> REGIONS: CPT | Performed by: PHYSICAL THERAPIST

## 2018-08-31 NOTE — PROGRESS NOTES
Daily Note     Today's date: 2018  Patient name: Tika Khan  : 3/5/1931  MRN: [id number]  Referring provider: Sam Gramajo DO  Dx:   Encounter Diagnosis     ICD-10-CM    1  S/P left knee arthroscopy Z98 890                   Subjective: " I got my stitches out yesterday, my knee feels good"       Objective: See treatment diary below  Manual     L HS         L Hip Flexor         L Gastroc        L Knee PROM flex/ext to tolerance          15' 15' 15' 15' 15'       Exercise Diary     Nu Step L5 x10' L4 x10' L4 x10' L4 x 10 L4x10   Heel Slides  10" x10 10" x10     Quad Set  10" x10 10" x10 10" x10 10''10x 10"x10   Hip Add with Ball 5" x20 5" x20 5" x20 5''20x 5"x20   Clamshell 5" x20 Blue TB 5" x20 Blue TB 5" x20 Blue Tb 5''20x blue 5" x20 blue   Bridges  5" x20 5" x20 5" x20 5''20x 5" x20   SLR 4 way  3x10 Flex 1# 3x10 Flex 3x10 flex 3x10 flexion 3x10 flex   Step Up  "C" x20 ea "C" x20 each  "B" x20 ea  B x20 "B" x20   SLS 30" x3 ea 30''3x       HR/TR  x30 x30 x30 x30 x30   Lunges         CP with LLLDE 10' with ankle prop  10' with ankle prop 10'  Pt defers   Mini Squat  3x10 3x10   3x10 3x10   Tmill walking 5' @ 1 3 MPH cueing on proper gait pattern 5' @ 1 3 MPH cueing on proper gait pattern              HS curl NV NV      Knee extension  NV NV                                          Modalities                                     Assessment: Tolerated treatment well  Patient exhibited good technique with therapeutic exercises  Will add gym exercises next session, to help with lower extremity strengthening  Plan: Continue per plan of care

## 2018-09-05 ENCOUNTER — OFFICE VISIT (OUTPATIENT)
Dept: PHYSICAL THERAPY | Facility: CLINIC | Age: 83
End: 2018-09-05
Payer: MEDICARE

## 2018-09-05 DIAGNOSIS — Z98.890 S/P LEFT KNEE ARTHROSCOPY: Primary | ICD-10-CM

## 2018-09-05 PROCEDURE — 97110 THERAPEUTIC EXERCISES: CPT | Performed by: PHYSICAL THERAPIST

## 2018-09-05 PROCEDURE — 97140 MANUAL THERAPY 1/> REGIONS: CPT | Performed by: PHYSICAL THERAPIST

## 2018-09-05 NOTE — PROGRESS NOTES
Daily Note     Today's date: 2018  Patient name: Harpal Carpenter  : 3/5/1931  MRN: [id number]  Referring provider: Claritza Jameson DO  Dx:   Encounter Diagnosis     ICD-10-CM    1  S/P left knee arthroscopy Z98 890                   Subjective: Pt reports that he is doing well today      Objective: See treatment diary below    Manual     L HS         L Hip Flexor         L Gastroc        L Knee PROM flex/ext to tolerance          15' 15' 15' 15' 15'       Exercise Diary     Nu Step L5 x10' L4 x10' L4 x10' L4 x 10 L4x10   Heel Slides  10" x10 10" x10     Quad Set  10" x10 10" x10 10" x10 10''10x 10"x10   Hip Add with Ball 5" x20 5" x20 5" x20 5''20x 5"x20   Clamshell 5" x20 Blue TB 5" x20 Blue TB 5" x20 Blue Tb 5''20x blue 5" x20 blue   Bridges  5" x20 5" x20 5" x20 5''20x 5" x20   SLR 4 way  3x10 Flex 1# 3x10 Flex 3x10 flex 1 5   3x10 Abduction  3x10 flexion 3x10 flex   Step Up  "C" x20 ea "C" x20 each  "B" x20 ea  B x20 "B" x20   SLS 30" x3 ea 30''3x  30''3x     HR/TR  x30 x30 x30 x30 x30   Lunges         CP with LLLDE 10' with ankle prop  10' with ankle prop 10'  Pt defers   Mini Squat  3x10 3x10  3x10 3x10 3x10   Tmill walking 5' @ 1 3 MPH cueing on proper gait pattern 5' @ 1 3 MPH cueing on proper gait pattern              HS curl NV NV      Knee extension  NV NV                                          Modalities                                   Assessment: Tolerated treatment well  Patient exhibited good technique with therapeutic exercises  Progressed SLR flexion exercises to 1 5# ankle weights this session, to help with lower extremity strengthening  Plan: Continue per plan of care

## 2018-09-07 ENCOUNTER — OFFICE VISIT (OUTPATIENT)
Dept: PHYSICAL THERAPY | Facility: CLINIC | Age: 83
End: 2018-09-07
Payer: MEDICARE

## 2018-09-07 DIAGNOSIS — Z98.890 S/P LEFT KNEE ARTHROSCOPY: Primary | ICD-10-CM

## 2018-09-07 PROCEDURE — 97110 THERAPEUTIC EXERCISES: CPT

## 2018-09-07 PROCEDURE — 97150 GROUP THERAPEUTIC PROCEDURES: CPT

## 2018-09-07 PROCEDURE — 97140 MANUAL THERAPY 1/> REGIONS: CPT

## 2018-09-07 NOTE — PROGRESS NOTES
Daily Note     Today's date: 2018  Patient name: Argenis Cruz  : 3/5/1931  MRN: [id number]  Referring provider: Stacie Pritchard DO  Dx:   Encounter Diagnosis     ICD-10-CM    1  S/P left knee arthroscopy Z98 890                   Subjective: Pt offers no new comments and denies all pain  Objective: See treatment diary below  Manual     L HS         L Hip Flexor         L Gastroc        L Knee PROM flex/ext to tolerance          15' 15' 15' 15' 15'       Exercise Diary     Nu Step L5 x10' L4 x10' L4 x10' L4 x 10 L4x10   Heel Slides  10" x10 10" x10     Quad Set  10" x10 10" x10 10" x10 10''10x 10"x10   Hip Add with Ball 5" x20 5" x20 5" x20 5''20x 5"x20   Clamshell 5" x20 Blue TB 5" x20 Blue TB 5" x20 Blue Tb 5''20x blue 5" x20 blue   Bridges  5" x20 5" x20 5" x20 5''20x 5" x20   SLR 4 way  3x10 Flex 1# 3x10 Flex 3x10 flex 1 5   3x10 Abduction   3x10 flex   Step Up  "C" x20 ea "C" x20 each  "B" x20 ea  "C" x20 "B" x20   SLS 30" x3 ea 30''3x  30''3x 30"x3    HR/TR  x30 x30 x30 x30 x30   Lunges         CP with LLLDE 10' with ankle prop  10' with ankle prop  Pt defers   Mini Squat  3x10 3x10  3x10 3x10 3x10   Tmill walking 5' @ 1 3 MPH cueing on proper gait pattern 5' @ 1 3 MPH cueing on proper gait pattern  5' @ 2 0 MPH cueing on proper gait pattern            HS curl NV NV      Knee extension  NV NV                                          Modalities                                     Assessment: Tolerated treatment well  Pt has PROM of the L knee WNL  Patient exhibited good technique with therapeutic exercises and would benefit from continued PT      Plan: Progress note during next visit  Potential discharge next visit

## 2018-09-10 ENCOUNTER — TRANSCRIBE ORDERS (OUTPATIENT)
Dept: PHYSICAL THERAPY | Facility: CLINIC | Age: 83
End: 2018-09-10

## 2018-09-10 ENCOUNTER — EVALUATION (OUTPATIENT)
Dept: PHYSICAL THERAPY | Facility: CLINIC | Age: 83
End: 2018-09-10
Payer: MEDICARE

## 2018-09-10 DIAGNOSIS — Z98.890 S/P LEFT KNEE ARTHROSCOPY: Primary | ICD-10-CM

## 2018-09-10 PROCEDURE — 97140 MANUAL THERAPY 1/> REGIONS: CPT | Performed by: PHYSICAL THERAPIST

## 2018-09-10 PROCEDURE — 97110 THERAPEUTIC EXERCISES: CPT | Performed by: PHYSICAL THERAPIST

## 2018-09-10 PROCEDURE — G8990 OTHER PT/OT CURRENT STATUS: HCPCS | Performed by: PHYSICAL THERAPIST

## 2018-09-10 PROCEDURE — G8991 OTHER PT/OT GOAL STATUS: HCPCS | Performed by: PHYSICAL THERAPIST

## 2018-09-10 PROCEDURE — 97164 PT RE-EVAL EST PLAN CARE: CPT | Performed by: PHYSICAL THERAPIST

## 2018-09-10 NOTE — LETTER
September 10, 2018    Eliezer Huber DO  [address]  [address] [facility] [address]    Patient: William Rodney   YOB: 1931   Date of Visit: 9/10/2018     Encounter Diagnosis     ICD-10-CM    1  S/P left knee arthroscopy Z98 890        Dear Dr Angela Mathur:    Please review the attached Plan of Care from [address] [facility],[address] [facility]'s recent visit  Please verify that you agree therapy should continue by signing the attached document and sending it back to our office  If you have any questions or concerns, please don't hesitate to call  Sincerely,    Alondra Navas, PT      Referring Provider:      I certify that I have read the below Plan of Care and certify the need for these services furnished under this plan of treatment while under my care  Eliezer Huber DO  [address]  [address] [facility] [address]  [address] [facility] [address]: [phone]          PT Re-Evaluation       Assessment  Impairments: abnormal gait, abnormal or restricted ROM, activity intolerance and impaired physical strength    Assessment details: William Rodney has demonstrated decreased pain, increased strength, increased range of motion, and increased activity tolerance since starting physical therapy services  They report an overall improvement of 80% thus far  Pt is ambulating with no AD at this time, but still displays an antalgic gait and a decreased miguel, velocity and step length  He continues to present with decreased strength, decreased range of motion, and decreased activity tolerance and would benefit from additional skilled physical therapy interventions to address impairments and maximize function  Prognosis: good    Goals  1  In 4-6 weeks, patient will demonstrate a decrease in pain to 0/10 during functional activities  Met   2  In 4-6 weeks, patient will demonstrate an increase in range of motion by 15 degrees  Met  3   In 4-6 weeks, patient will demonstrate an increase in strength by 1/2 grade on MMT  Met     1  In 6-8 weeks, patient will be independent with their home exercise program  Met   2  In 6-8 weeks, patient will have zero limitations with strength  Partially Met   3  In 6-8 weeks, patient will have zero limitations with ROM  Partially Met   4  In 6-8 weeks, patient will have zero limitations with ambulation  Partially Met   5  In 6-8 weeks, patient will have zero limitations with stair negotiation  Partially Met      Plan  Patient would benefit from: skilled physical therapy  Planned therapy interventions: manual therapy, therapeutic exercise and home exercise program  Frequency: 3x week  Duration in weeks: 4  Treatment plan discussed with: patient  Plan details: Patient was provided a home exercise program and demonstrated an understanding of exercises  Patient was advised to stop performing home exercise program if symptoms increase or new complaints developed  Verbal understanding demonstrated regarding home exercise program instructions  Surgical incision(s) inspected  Incision(s) clean, dry and intact with no signs/symptoms of infection  Patient was educated on signs/symptoms of infection and was advised to contact MD immediately if suspect infection  Subjective Evaluation    History of Present Illness  Date of surgery: 8/15/2018  Mechanism of injury:  The patient reports he feels about 80 percent better since beginning physical therapy  He reports no difficulty with walking, stairs, standing, ADL's, and IADL's  His biggest issue at this point is that he wants to continue to work on improving the strength in the left knee         Pain                                       9/10   Current pain ratin             0  At best pain ratin              0  At worst pain rating: 3            0  Quality: sharp and knife-like  Relieving factors: medications  Aggravating factors: standing, walking and stair climbing    Social Support  Steps to enter house: no  Stairs in house: no   Lives in: [place]  Lives with: alone    Employment status: not working  Hand dominance: right    Treatments  Previous treatment: medication  Current treatment: medication and physical therapy  Patient Goals  Patient goals for therapy: decreased pain, increased strength and increased motion          Objective     Active Range of Motion   Left Knee                       9/10  Flexion: 85 degrees       124  Degrees   Extension: -5 degrees     0    Degrees     Right Knee   Flexion: 120 degrees   Extension: 0 degrees     Strength/Myotome Testing     Left Knee                                        9/10   Left knee flexion strength: NT         4/5  Left knee extension strength: NT    4/5    Right Knee   Flexion: 4+  Extension: 4+      Manual  8/29 8/31  9/5 9/7 9/10   L HS         L Hip Flexor         L Gastroc        L Knee PROM flex/ext to tolerance          15' 15' 15' 15' 15'       Exercise Diary  8/29 8/31 9/5 9/7 9/10   Nu Step L5 x10' L4 x10' L4 x10' L4 x 10 L4x10   Heel Slides  10" x10 10" x10     Quad Set  10" x10 10" x10 10" x10 10''10x 10"x10   Hip Add with Ball 5" x20 5" x20 5" x20 5''20x 5"x20   Clamshell 5" x20 Blue TB 5" x20 Blue TB 5" x20 Blue Tb 5''20x blue 5" x20 blue   Bridges  5" x20 5" x20 5" x20 5''20x 5" x20   SLR 4 way  3x10 Flex 1# 3x10 Flex 3x10 flex 1 5   3x10 Abduction   3x10 flex   Step Up  "C" x20 ea "C" x20 each  "B" x20 ea  "C" x20 "B" x20   SLS 30" x3 ea 30''3x  30''3x 30"x3    HR/TR  x30 x30 x30 x30 x30   Lunges         CP with LLLDE 10' with ankle prop  10' with ankle prop  10   Mini Squat  3x10 3x10  3x10 3x10 3x10   Tmill walking 5' @ 1 3 MPH cueing on proper gait pattern 5' @ 1 3 MPH cueing on proper gait pattern  5' @ 2 0 MPH cueing on proper gait pattern            HS curl NV NV   15# 3x10   Knee extension  NV NV   20# 3x10    Leg Press      80# 3x10                                Modalities

## 2018-09-10 NOTE — PROGRESS NOTES
PT Re-Evaluation       Assessment  Impairments: abnormal gait, abnormal or restricted ROM, activity intolerance and impaired physical strength    Assessment details: Patti Chow has demonstrated decreased pain, increased strength, increased range of motion, and increased activity tolerance since starting physical therapy services  They report an overall improvement of 80% thus far  Pt is ambulating with no AD at this time, but still displays an antalgic gait and a decreased miguel, velocity and step length  He continues to present with decreased strength, decreased range of motion, and decreased activity tolerance and would benefit from additional skilled physical therapy interventions to address impairments and maximize function  Prognosis: good    Goals  1  In 4-6 weeks, patient will demonstrate a decrease in pain to 0/10 during functional activities  Met   2  In 4-6 weeks, patient will demonstrate an increase in range of motion by 15 degrees  Met  3  In 4-6 weeks, patient will demonstrate an increase in strength by 1/2 grade on MMT  Met     1  In 6-8 weeks, patient will be independent with their home exercise program  Met   2  In 6-8 weeks, patient will have zero limitations with strength  Partially Met   3  In 6-8 weeks, patient will have zero limitations with ROM  Partially Met   4  In 6-8 weeks, patient will have zero limitations with ambulation  Partially Met   5  In 6-8 weeks, patient will have zero limitations with stair negotiation  Partially Met      Plan  Patient would benefit from: skilled physical therapy  Planned therapy interventions: manual therapy, therapeutic exercise and home exercise program  Frequency: 3x week  Duration in weeks: 4  Treatment plan discussed with: patient  Plan details: Patient was provided a home exercise program and demonstrated an understanding of exercises    Patient was advised to stop performing home exercise program if symptoms increase or new complaints developed  Verbal understanding demonstrated regarding home exercise program instructions  Surgical incision(s) inspected  Incision(s) clean, dry and intact with no signs/symptoms of infection  Patient was educated on signs/symptoms of infection and was advised to contact MD immediately if suspect infection  Subjective Evaluation    History of Present Illness  Date of surgery: 8/15/2018  Mechanism of injury:  The patient reports he feels about 80 percent better since beginning physical therapy  He reports no difficulty with walking, stairs, standing, ADL's, and IADL's  His biggest issue at this point is that he wants to continue to work on improving the strength in the left knee         Pain                                       9/10   Current pain ratin             0  At best pain ratin              0  At worst pain rating: 3            0  Quality: sharp and knife-like  Relieving factors: medications  Aggravating factors: standing, walking and stair climbing    Social Support  Steps to enter house: no  Stairs in house: no   Lives in: [place]  Lives with: alone    Employment status: not working  Hand dominance: right    Treatments  Previous treatment: medication  Current treatment: medication and physical therapy  Patient Goals  Patient goals for therapy: decreased pain, increased strength and increased motion          Objective     Active Range of Motion   Left Knee                       9/10  Flexion: 85 degrees       124  Degrees   Extension: -5 degrees     0    Degrees     Right Knee   Flexion: 120 degrees   Extension: 0 degrees     Strength/Myotome Testing     Left Knee                                        9/10   Left knee flexion strength: NT         4/5  Left knee extension strength: NT    4/5    Right Knee   Flexion: 4+  Extension: 4+      Manual  8/29 8/31  9/5 9/7 9/10   L HS         L Hip Flexor         L Gastroc        L Knee PROM flex/ext to tolerance          15' 15' 15' 15' 15'       Exercise Diary  8/29 8/31 9/5 9/7 9/10   Nu Step L5 x10' L4 x10' L4 x10' L4 x 10 L4x10   Heel Slides  10" x10 10" x10     Quad Set  10" x10 10" x10 10" x10 10''10x 10"x10   Hip Add with Ball 5" x20 5" x20 5" x20 5''20x 5"x20   Clamshell 5" x20 Blue TB 5" x20 Blue TB 5" x20 Blue Tb 5''20x blue 5" x20 blue   Bridges  5" x20 5" x20 5" x20 5''20x 5" x20   SLR 4 way  3x10 Flex 1# 3x10 Flex 3x10 flex 1 5   3x10 Abduction   3x10 flex   Step Up  "C" x20 ea "C" x20 each  "B" x20 ea  "C" x20 "B" x20   SLS 30" x3 ea 30''3x  30''3x 30"x3    HR/TR  x30 x30 x30 x30 x30   Lunges         CP with LLLDE 10' with ankle prop  10' with ankle prop  10   Mini Squat  3x10 3x10  3x10 3x10 3x10   Tmill walking 5' @ 1 3 MPH cueing on proper gait pattern 5' @ 1 3 MPH cueing on proper gait pattern  5' @ 2 0 MPH cueing on proper gait pattern            HS curl NV NV   15# 3x10   Knee extension  NV NV   20# 3x10    Leg Press      80# 3x10                                Modalities

## 2018-09-12 ENCOUNTER — OFFICE VISIT (OUTPATIENT)
Dept: PHYSICAL THERAPY | Facility: CLINIC | Age: 83
End: 2018-09-12
Payer: MEDICARE

## 2018-09-12 DIAGNOSIS — Z98.890 S/P LEFT KNEE ARTHROSCOPY: Primary | ICD-10-CM

## 2018-09-12 PROCEDURE — 97140 MANUAL THERAPY 1/> REGIONS: CPT

## 2018-09-12 PROCEDURE — 97110 THERAPEUTIC EXERCISES: CPT

## 2018-09-12 NOTE — PROGRESS NOTES
Daily Note     Today's date: 2018  Patient name: Silvano Best  : 3/5/1931  MRN: [id number]  Referring provider: Kelsi Simon DO  Dx:   Encounter Diagnosis     ICD-10-CM    1  S/P left knee arthroscopy Z98 890                   Subjective: "The knee feels good "      Objective: See treatment diary below  Manual   9/10   L HS         L Hip Flexor         L Gastroc        L Knee PROM flex/ext to tolerance          15' 15' 15' 15' 15'       Exercise Diary   9/10   Nu Step L5 x10' L4 x10' L4 x10' L4 x 10 L4x10   Heel Slides  10" x10 10" x10     Quad Set   10" x10 10" x10 10''10x 10"x10   Hip Add with Ball 5" x20 5" x20 5" x20 5''20x 5"x20   Clamshell 5" x20 Blue TB 5" x20 Blue TB 5" x20 Blue Tb 5''20x blue 5" x20 blue   Bridges  5" x20 5" x20 5" x20 5''20x 5" x20   SLR 4 way  3x10 Flex 2# 3x10 Flex  3x10 flex 1 5   3x10 Abduction   3x10 flex   Step Up  "C" x20 ea "C" x20 each  "B" x20 ea  "C" x20 "B" x20   SLS 30" x3 ea 30''3x  30''3x 30"x3    HR/TR  x30 x30 x30 x30 x30   Lunges         CP with LLLDE   10' with ankle prop  10   Mini Squat  3x10 3x10  3x10 3x10 3x10   Tmill walking 5' @ 2 0 MPH cueing on proper gait pattern 5' @ 1 3 MPH cueing on proper gait pattern  5' @ 2 0 MPH cueing on proper gait pattern            HS curl 30# 3x10 NV   15# 3x10   Knee extension  30# 3x10 NV   20# 3x10    Leg Press  80# 3x10     80# 3x10                                Modalities                                     Assessment: Tolerated treatment well  Increased weight on multiple machines  P demonstrates good independence in the gym this date and PROM WNL  Patient demonstrated fatigue post treatment, exhibited good technique with therapeutic exercises and would benefit from continued PT  Plan: Potential discharge next visit 
Statement Selected

## 2018-09-13 ENCOUNTER — OFFICE VISIT (OUTPATIENT)
Dept: PHYSICAL THERAPY | Facility: CLINIC | Age: 83
End: 2018-09-13
Payer: MEDICARE

## 2018-09-13 DIAGNOSIS — Z98.890 S/P LEFT KNEE ARTHROSCOPY: Primary | ICD-10-CM

## 2018-09-13 PROCEDURE — 97140 MANUAL THERAPY 1/> REGIONS: CPT

## 2018-09-13 PROCEDURE — 97110 THERAPEUTIC EXERCISES: CPT

## 2018-09-13 NOTE — PROGRESS NOTES
Daily Note     Today's date: 2018  Patient name: Maria Del Carmen Eric  : 3/5/1931  MRN: [id number]  Referring provider: Tania Angelo DO  Dx:   Encounter Diagnosis     ICD-10-CM    1  S/P left knee arthroscopy Z98 890                   Subjective: Pt reports that he "feels fine " He is ready to get back to the gym  Objective: See treatment diary below  Manual  9/12 9/13 9/5 9/7 9/10   L HS         L Hip Flexor         L Gastroc        L Knee PROM flex/ext to tolerance          15' 15' 15' 15' 15'       Exercise Diary   9/10   Nu Step L5 x10' L4 x10' L4 x10' L4 x 10 L4x10   Heel Slides   10" x10     Quad Set    10" x10 10''10x 10"x10   Hip Add with Ball 5" x20 5" x20 5" x20 5''20x 5"x20   Clamshell 5" x20 Blue TB 5" x20 Blue TB 5" x20 Blue Tb 5''20x blue 5" x20 blue   Bridges  5" x20 5" x20 5" x20 5''20x 5" x20   SLR 4 way  3x10 Flex 2# 3x10 Flex 2# 3x10 flex 1 5   3x10 Abduction   3x10 flex   Step Up  "C" x20 ea "C" x20 each  "B" x20 ea  "C" x20 "B" x20   SLS 30" x3 ea 30''3x  30''3x 30"x3    HR/TR  x30 x30 x30 x30 x30   Lunges         CP with LLLDE   10' with ankle prop  10   Mini Squat  3x10 3x10  3x10 3x10 3x10   Tmill walking 5' @ 2 0 MPH cueing on proper gait pattern 5' @ 2 0 MPH cueing on proper gait pattern  5' @ 2 0 MPH cueing on proper gait pattern            HS curl 30# 3x10 30# 3x10    15# 3x10   Knee extension  30# 3x10 30# 3x10    20# 3x10    Leg Press  80# 3x10  80# 3x10    80# 3x10                                Modalities                                     Assessment: Tolerated treatment well  Pt has PROM of the L knee WNL and demonstrated good strength and independence in the gym  Patient exhibited good technique with therapeutic exercises      Plan: Pt to be D/C from skilled PT by DPT

## 2018-09-14 ENCOUNTER — APPOINTMENT (OUTPATIENT)
Dept: PHYSICAL THERAPY | Facility: CLINIC | Age: 83
End: 2018-09-14
Payer: MEDICARE

## 2018-10-16 ENCOUNTER — APPOINTMENT (OUTPATIENT)
Dept: LAB | Facility: HOSPITAL | Age: 83
End: 2018-10-16
Payer: MEDICARE

## 2018-10-16 ENCOUNTER — TRANSCRIBE ORDERS (OUTPATIENT)
Dept: ADMINISTRATIVE | Facility: HOSPITAL | Age: 83
End: 2018-10-16

## 2018-10-16 DIAGNOSIS — D68.9 COAGULOPATHY (HCC): Primary | ICD-10-CM

## 2018-10-16 DIAGNOSIS — D68.9 COAGULOPATHY (HCC): ICD-10-CM

## 2018-10-16 LAB
INR PPP: 2.94 (ref 0.9–1.5)
PROTHROMBIN TIME: 34.9 SECONDS (ref 10.1–12.9)

## 2018-10-16 PROCEDURE — 36415 COLL VENOUS BLD VENIPUNCTURE: CPT

## 2018-10-16 PROCEDURE — 85610 PROTHROMBIN TIME: CPT

## 2018-10-17 NOTE — PROGRESS NOTES
At this time, patient has achieved their maximum functional benefit from skilled physical therapy services and will be discharged to their [facility]  Patient is in agreement with the plan of care    As a result, patient is discharged from physical therapy

## 2018-10-26 ENCOUNTER — APPOINTMENT (OUTPATIENT)
Dept: LAB | Facility: HOSPITAL | Age: 83
End: 2018-10-26
Payer: MEDICARE

## 2018-10-26 DIAGNOSIS — D68.9 COAGULOPATHY (HCC): ICD-10-CM

## 2018-10-26 LAB
INR PPP: 2.05 (ref 0.9–1.5)
PROTHROMBIN TIME: 24.1 SECONDS (ref 10.1–12.9)

## 2018-10-26 PROCEDURE — 85610 PROTHROMBIN TIME: CPT

## 2018-10-26 PROCEDURE — 36415 COLL VENOUS BLD VENIPUNCTURE: CPT

## 2018-11-02 ENCOUNTER — OFFICE VISIT (OUTPATIENT)
Dept: SURGICAL ONCOLOGY | Facility: CLINIC | Age: 83
End: 2018-11-02
Payer: MEDICARE

## 2018-11-02 VITALS
BODY MASS INDEX: 29.66 KG/M2 | DIASTOLIC BLOOD PRESSURE: 68 MMHG | WEIGHT: 189 LBS | HEART RATE: 77 BPM | RESPIRATION RATE: 16 BRPM | HEIGHT: 67 IN | TEMPERATURE: 97.1 F | SYSTOLIC BLOOD PRESSURE: 130 MMHG

## 2018-11-02 DIAGNOSIS — C43.72 MALIGNANT MELANOMA OF LEFT FOOT (HCC): Primary | ICD-10-CM

## 2018-11-02 PROCEDURE — 99205 OFFICE O/P NEW HI 60 MIN: CPT | Performed by: SURGERY

## 2018-11-02 RX ORDER — TRAMADOL HYDROCHLORIDE 50 MG/1
50 TABLET ORAL EVERY 6 HOURS PRN
Qty: 10 TABLET | Refills: 0 | Status: ON HOLD | OUTPATIENT
Start: 2018-11-02 | End: 2019-01-23 | Stop reason: CLARIF

## 2018-11-02 NOTE — LETTER
November 2, 2018     [address]    Patient: Cata Mann   YOB: 1931   Date of Visit: 11/2/2018       Dear Dr Dawn Course: Thank you for referring Cata Mann to me for evaluation  Below are my notes for this consultation  If you have questions, please do not hesitate to call me  I look forward to following your patient along with you  Sincerely,        Rafael Fleischer, MD        CC: No Recipients  Rafael Fleischer, MD  11/2/2018 12:53 PM  Sign at close encounter               Surgical Oncology consult       [address]  3/5/1931  [id number]  [place] [address]    Chief Complaint   Patient presents with    Consult     Patient is here for consult about melanoma  Assessment/Plan:    No problem-specific Assessment & Plan notes found for this encounter  Diagnoses and all orders for this visit:    Malignant melanoma of left foot ([address] )  -     traMADol (ULTRAM) 50 mg tablet; Take 1 tablet (50 mg total) by mouth every 6 (six) hours as needed for moderate pain  -     NM lymphatic melanoma; Future  -     Ambulatory referral to Plastic Surgery; Future  -     Case request operating room: EXCISION WIDE LESION LOWER EXTREMITY;  sln bx left foot melanoma, cosurgery with plastic surgery; Standing  -     Ambulatory referral to Cardiology; Future  -     Basic metabolic panel; Future  -     APTT; Future  -     CBC and Platelet; Future  -     Protime-INR; Future  -     EKG 12 lead;  Future  -     XR chest pa & lateral; Future  -     Case request operating room: EXCISION WIDE LESION LOWER EXTREMITY;  sln bx left foot melanoma, cosurgery with plastic surgery    Other orders  -     Incentive spirometry; Standing  -     Insert and maintain IV line; Standing  -     Void On-Call to O R ; Standing  -     Place sequential compression device; Standing  -     Incentive spirometry; Standing  -     Insert and maintain IV line; Standing  -     Void On-Call to O R ; Standing  -     Place sequential compression device; Standing        Advance Care Planning/Advance Directives:  Discussed disease status, cancer treatment plans and/or cancer treatment goals with the patient  No history exists  History of Present Illness:  Patient is an 26-year-old man who over the last several months has noticed a brown pigmented lesion which has been present for most of his life on his left lateral dorsal foot start to change appearance and become somewhat raised  He thought this was a blister  This culminated in a biopsy which confirmed a 1 6 mm deep melanoma with 13 mitoses and ulceration present  He was referred for evaluation and treatment  He does report excess of sunburning as a child  No prior skin cancer history  No personal or family history of moles or melanoma  Review of Systems   Constitutional: Negative  HENT: Negative  Eyes: Negative  Respiratory: Negative  Cardiovascular: Negative  Gastrointestinal: Negative  Endocrine: Negative  Genitourinary: Negative  Musculoskeletal: Negative  Skin: Negative  Allergic/Immunologic: Negative  Neurological: Negative  Hematological: Negative  Psychiatric/Behavioral: Negative  There is no problem list on file for this patient      Past Medical History:   Diagnosis Date    Bleeding risk due to Coumadin and aspirin     Coronary artery disease     Glaucoma     right eye    Hyperlipidemia     Hypertension      Past Surgical History:   Procedure Laterality Date    CARDIAC SURGERY      1974 and 65475'L    CORONARY STENT PLACEMENT  1990's    pt has two coronary stents    JOINT REPLACEMENT Left     pt states he has a left thr    DE KNEE SCOPE,MED/LAT MENISECTOMY Left 8/15/2018    Procedure: left KNEE ARTHROSCOPY with medial & lateral menisectomy, chondroplasty,synovectomy & injection;  Surgeon: Zenobia Bray DO;  Location: [address] [place] [address] MAIN OR;  Service: Orthopedics     Family History   Problem Relation Age of Onset    Heart disease Mother      Social History     Social History    Marital status:      Spouse name: N/A    Number of children: N/A    Years of education: N/A     Occupational History    Not on file  Social History Main Topics    Smoking status: Former Smoker     Packs/day: 1 50     Quit date: 1974    Smokeless tobacco: Never Used    Alcohol use No    Drug use: No    Sexual activity: Not Currently     Other Topics Concern    Not on file     Social History Narrative    No narrative on file       Current Outpatient Prescriptions:     amLODIPine (NORVASC) 5 mg tablet, 10 mg Daily, Disp: , Rfl:     aspirin 81 MG tablet, Take 81 mg by mouth daily, Disp: , Rfl:     atorvastatin (LIPITOR) 80 mg tablet, Take 40 mg by mouth daily, Disp: , Rfl:     atropine (ISOPTO ATROPINE) 1 % ophthalmic solution, Administer 1 drop to the right eye daily  , Disp: , Rfl:     Calcium Carb-Cholecalciferol (CALCIUM 600-D PO), daily, Disp: , Rfl:     losartan (COZAAR) 50 mg tablet, Daily, Disp: , Rfl:     niacin (NIASPAN) 750 MG CR tablet, Take 750 mg by mouth daily at bedtime, Disp: , Rfl:     prednisoLONE acetate (PRED FORTE) 1 % ophthalmic suspension, Administer 1 drop to the right eye daily  , Disp: , Rfl:     timolol (TIMOPTIC) 0 5 % ophthalmic solution, Administer 1 drop into the left eye 2 (two) times a day, Disp: , Rfl:     warfarin (COUMADIN) 5 mg tablet, Take 5 mg by mouth daily  , Disp: , Rfl:   Allergies   Allergen Reactions    No Active Allergies      Vitals:    11/02/18 1212   BP: 130/68   Pulse: 77   Resp: 16   Temp: (!) 97 1 °F (36 2 °C)       Physical Exam   Constitutional: He is oriented to person, place, and time     HENT:   Head: Normocephalic and atraumatic  Eyes: Pupils are equal, round, and reactive to light  Conjunctivae are normal    Neck: Normal range of motion  Neck supple  Cardiovascular: Normal rate, regular rhythm, normal heart sounds and intact distal pulses  Pulmonary/Chest: Effort normal and breath sounds normal    Abdominal: Soft  Bowel sounds are normal    Musculoskeletal: Normal range of motion  Neurological: He is alert and oriented to person, place, and time  Skin: Skin is warm and dry  Left lateral portion of foot, raised nodular non pigmented lesion, approximately 1 5 cm in diameter; clinically negative inguinal nodes  Pathology:  Foot biopsy 1 6 mm deep melanoma, 13 mitoses per mm squared, plus ulceration  Labs:  none    Imaging  No results found  I reviewed the above laboratory and imaging data  Discussion/Summary:  Intermediate thickness melanoma, in need for wide excision, with anticipated graft for reconstruction  We will therefore set up to see Plastic surgery team to plan co surgery  Also will need sentinel node biopsy for staging and treatment  Rationale for this discussed with patient and his daughter  All questions answered  Consents signed at this visit  Plan for left foot melanoma excision and left groin sentinel node biopsy

## 2018-11-02 NOTE — PROGRESS NOTES
Surgical Oncology consult       [address] [facility] SURGICAL Brandon Vasquez  [address] [place] [address]  [place] [address]    Alcides Kuhn  3/5/1931  [id number]  [address]  [facility] MELISAGABRIELA Arzate [address] [place] [id number]    Chief Complaint   Patient presents with    Consult     Patient is here for consult about melanoma  Assessment/Plan:    No problem-specific Assessment & Plan notes found for this encounter  Diagnoses and all orders for this visit:    Malignant melanoma of left foot ([address] )  -     traMADol (ULTRAM) 50 mg tablet; Take 1 tablet (50 mg total) by mouth every 6 (six) hours as needed for moderate pain  -     NM lymphatic melanoma; Future  -     Ambulatory referral to Plastic Surgery; Future  -     Case request operating room: EXCISION WIDE LESION LOWER EXTREMITY;  sln bx left foot melanoma, cosurgery with plastic surgery; Standing  -     Ambulatory referral to Cardiology; Future  -     Basic metabolic panel; Future  -     APTT; Future  -     CBC and Platelet; Future  -     Protime-INR; Future  -     EKG 12 lead; Future  -     XR chest pa & lateral; Future  -     Case request operating room: EXCISION WIDE LESION LOWER EXTREMITY;  sln bx left foot melanoma, cosurgery with plastic surgery    Other orders  -     Incentive spirometry; Standing  -     Insert and maintain IV line; Standing  -     Void On-Call to O R ; Standing  -     Place sequential compression device; Standing  -     Incentive spirometry; Standing  -     Insert and maintain IV line; Standing  -     Void On-Call to O R ; Standing  -     Place sequential compression device; Standing        Advance Care Planning/Advance Directives:  Discussed disease status, cancer treatment plans and/or cancer treatment goals with the patient  No history exists         History of Present Illness:  Patient is an 71-year-old man who over the last several months has noticed a brown pigmented lesion which has been present for most of his life on his left lateral dorsal foot start to change appearance and become somewhat raised  He thought this was a blister  This culminated in a biopsy which confirmed a 1 6 mm deep melanoma with 13 mitoses and ulceration present  He was referred for evaluation and treatment  He does report excess of sunburning as a child  No prior skin cancer history  No personal or family history of moles or melanoma  Review of Systems   Constitutional: Negative  HENT: Negative  Eyes: Negative  Respiratory: Negative  Cardiovascular: Negative  Gastrointestinal: Negative  Endocrine: Negative  Genitourinary: Negative  Musculoskeletal: Negative  Skin: Negative  Allergic/Immunologic: Negative  Neurological: Negative  Hematological: Negative  Psychiatric/Behavioral: Negative  There is no problem list on file for this patient  Past Medical History:   Diagnosis Date    Bleeding risk due to Coumadin and aspirin     Coronary artery disease     Glaucoma     right eye    Hyperlipidemia     Hypertension      Past Surgical History:   Procedure Laterality Date    CARDIAC SURGERY      1974 and 96510'Q    CORONARY STENT PLACEMENT  1990's    pt has two coronary stents    JOINT REPLACEMENT Left     pt states he has a left thr    WI KNEE SCOPE,MED/LAT MENISECTOMY Left 8/15/2018    Procedure: left KNEE ARTHROSCOPY with medial & lateral menisectomy, chondroplasty,synovectomy & injection;  Surgeon: Cassie Mendes DO;  Location: [facility] MAIN OR;  Service: Orthopedics     Family History   Problem Relation Age of Onset    Heart disease Mother      Social History     Social History    Marital status:      Spouse name: N/A    Number of children: N/A    Years of education: N/A     Occupational History    Not on file       Social History Main Topics    Smoking status: Former Smoker     Packs/day: 1 50     Quit date: 1974    Smokeless tobacco: Never Used    Alcohol use No    Drug use: No    Sexual activity: Not Currently     Other Topics Concern    Not on file     Social History Narrative    No narrative on file       Current Outpatient Prescriptions:     amLODIPine (NORVASC) 5 mg tablet, 10 mg Daily, Disp: , Rfl:     aspirin 81 MG tablet, Take 81 mg by mouth daily, Disp: , Rfl:     atorvastatin (LIPITOR) 80 mg tablet, Take 40 mg by mouth daily, Disp: , Rfl:     atropine (ISOPTO ATROPINE) 1 % ophthalmic solution, Administer 1 drop to the right eye daily  , Disp: , Rfl:     Calcium Carb-Cholecalciferol (CALCIUM 600-D PO), daily, Disp: , Rfl:     losartan (COZAAR) 50 mg tablet, Daily, Disp: , Rfl:     niacin (NIASPAN) 750 MG CR tablet, Take 750 mg by mouth daily at bedtime, Disp: , Rfl:     prednisoLONE acetate (PRED FORTE) 1 % ophthalmic suspension, Administer 1 drop to the right eye daily  , Disp: , Rfl:     timolol (TIMOPTIC) 0 5 % ophthalmic solution, Administer 1 drop into the left eye 2 (two) times a day, Disp: , Rfl:     warfarin (COUMADIN) 5 mg tablet, Take 5 mg by mouth daily  , Disp: , Rfl:   Allergies   Allergen Reactions    No Active Allergies      Vitals:    11/02/18 1212   BP: 130/68   Pulse: 77   Resp: 16   Temp: (!) 97 1 °F (36 2 °C)       Physical Exam   Constitutional: He is oriented to person, place, and time  HENT:   Head: Normocephalic and atraumatic  Eyes: Pupils are equal, round, and reactive to light  Conjunctivae are normal    Neck: Normal range of motion  Neck supple  Cardiovascular: Normal rate, regular rhythm, normal heart sounds and intact distal pulses  Pulmonary/Chest: Effort normal and breath sounds normal    Abdominal: Soft  Bowel sounds are normal    Musculoskeletal: Normal range of motion  Neurological: He is alert and oriented to person, place, and time  Skin: Skin is warm and dry     Left lateral portion of foot, raised nodular non pigmented lesion, approximately 1 5 cm in diameter; clinically negative inguinal nodes  Pathology:  Foot biopsy 1 6 mm deep melanoma, 13 mitoses per mm squared, plus ulceration  Labs:  none    Imaging  No results found  I reviewed the above laboratory and imaging data  Discussion/Summary:  Intermediate thickness melanoma, in need for wide excision, with anticipated graft for reconstruction  We will therefore set up to see Plastic surgery team to plan co surgery  Also will need sentinel node biopsy for staging and treatment  Rationale for this discussed with patient and his daughter  All questions answered  Consents signed at this visit  Plan for left foot melanoma excision and left groin sentinel node biopsy

## 2018-11-08 ENCOUNTER — APPOINTMENT (EMERGENCY)
Dept: RADIOLOGY | Facility: HOSPITAL | Age: 83
DRG: 293 | End: 2018-11-08
Payer: MEDICARE

## 2018-11-08 ENCOUNTER — HOSPITAL ENCOUNTER (INPATIENT)
Facility: HOSPITAL | Age: 83
LOS: 3 days | Discharge: HOME | DRG: 293 | End: 2018-11-11
Attending: EMERGENCY MEDICINE | Admitting: INTERNAL MEDICINE
Payer: MEDICARE

## 2018-11-08 DIAGNOSIS — R06.00 DYSPNEA, UNSPECIFIED TYPE: ICD-10-CM

## 2018-11-08 DIAGNOSIS — R79.1 SUPRATHERAPEUTIC INR: ICD-10-CM

## 2018-11-08 DIAGNOSIS — I50.9 CONGESTIVE HEART FAILURE, UNSPECIFIED HF CHRONICITY, UNSPECIFIED HEART FAILURE TYPE (HCC): Primary | ICD-10-CM

## 2018-11-08 PROBLEM — K80.20 CHOLELITHIASIS WITHOUT OBSTRUCTION: Status: ACTIVE | Noted: 2017-04-25

## 2018-11-08 PROBLEM — M81.0 OSTEOPOROSIS: Status: ACTIVE | Noted: 2017-04-25

## 2018-11-08 PROBLEM — I25.10 CORONARY ARTERIOSCLEROSIS IN NATIVE ARTERY: Status: ACTIVE | Noted: 2017-01-31

## 2018-11-08 PROBLEM — G60.3 IDIOPATHIC PROGRESSIVE POLYNEUROPATHY: Status: ACTIVE | Noted: 2017-04-25

## 2018-11-08 PROBLEM — M19.90 OSTEOARTHRITIS: Status: ACTIVE | Noted: 2017-04-25

## 2018-11-08 PROBLEM — H40.52X0 NEOVASCULAR GLAUCOMA OF LEFT EYE: Status: ACTIVE | Noted: 2018-09-16

## 2018-11-08 PROBLEM — I10 HYPERTENSIVE DISORDER: Status: ACTIVE | Noted: 2017-01-31

## 2018-11-08 PROBLEM — E66.3 OVERWEIGHT: Status: ACTIVE | Noted: 2017-04-25

## 2018-11-08 PROBLEM — H54.40 BLIND RIGHT EYE: Status: ACTIVE | Noted: 2017-06-20

## 2018-11-08 PROBLEM — E55.9 VITAMIN D DEFICIENCY: Status: ACTIVE | Noted: 2017-04-25

## 2018-11-08 PROBLEM — I10 ESSENTIAL HYPERTENSION: Status: ACTIVE | Noted: 2017-04-25

## 2018-11-08 PROBLEM — I63.9 CEREBROVASCULAR ACCIDENT (HCC): Status: ACTIVE | Noted: 2017-04-25

## 2018-11-08 LAB
ALBUMIN SERPL BCP-MCNC: 4.3 G/DL (ref 3.5–5.7)
ALP SERPL-CCNC: 104 U/L (ref 55–165)
ALT SERPL W P-5'-P-CCNC: 57 U/L (ref 7–52)
ANION GAP SERPL CALCULATED.3IONS-SCNC: 7 MMOL/L (ref 4–13)
APTT PPP: 55 SECONDS (ref 24–36)
AST SERPL W P-5'-P-CCNC: 44 U/L (ref 13–39)
BASOPHILS # BLD AUTO: 0 THOUSANDS/ΜL (ref 0–0.1)
BASOPHILS NFR BLD AUTO: 1 % (ref 0–2)
BILIRUB SERPL-MCNC: 1.1 MG/DL (ref 0.2–1)
BNP SERPL-MCNC: 379 PG/ML (ref 1–100)
BUN SERPL-MCNC: 35 MG/DL (ref 7–25)
CALCIUM SERPL-MCNC: 9.5 MG/DL (ref 8.6–10.5)
CHLORIDE SERPL-SCNC: 104 MMOL/L (ref 98–107)
CK MB SERPL-MCNC: 3.2 % (ref 0.6–6.3)
CK MB SERPL-MCNC: 5.8 NG/ML (ref 0.6–6.3)
CK SERPL-CCNC: 180 U/L (ref 30–308)
CO2 SERPL-SCNC: 26 MMOL/L (ref 21–31)
CREAT SERPL-MCNC: 1.17 MG/DL (ref 0.7–1.3)
EOSINOPHIL # BLD AUTO: 0.2 THOUSAND/ΜL (ref 0–0.61)
EOSINOPHIL NFR BLD AUTO: 2 % (ref 0–5)
ERYTHROCYTE [DISTWIDTH] IN BLOOD BY AUTOMATED COUNT: 15.6 % (ref 11.5–14.5)
FLUAV AG SPEC QL IA: NEGATIVE
FLUBV AG SPEC QL IA: NEGATIVE
GFR SERPL CREATININE-BSD FRML MDRD: 56 ML/MIN/1.73SQ M
GLUCOSE SERPL-MCNC: 146 MG/DL (ref 65–99)
HCT VFR BLD AUTO: 37.6 % (ref 36.5–49.3)
HGB BLD-MCNC: 12.1 G/DL (ref 14–18)
INR PPP: 5.09 (ref 0.9–1.5)
LACTATE SERPL-SCNC: 1.5 MMOL/L (ref 0.5–2)
LYMPHOCYTES # BLD AUTO: 1 THOUSANDS/ΜL (ref 0.6–4.47)
LYMPHOCYTES NFR BLD AUTO: 12 % (ref 21–51)
MAGNESIUM SERPL-MCNC: 2.6 MG/DL (ref 1.9–2.7)
MCH RBC QN AUTO: 29.2 PG (ref 26–34)
MCHC RBC AUTO-ENTMCNC: 32.2 G/DL (ref 31–37)
MCV RBC AUTO: 91 FL (ref 81–99)
MONOCYTES # BLD AUTO: 0.7 THOUSAND/ΜL (ref 0.17–1.22)
MONOCYTES NFR BLD AUTO: 8 % (ref 2–12)
NEUTROPHILS # BLD AUTO: 7 THOUSANDS/ΜL (ref 1.4–6.5)
NEUTS SEG NFR BLD AUTO: 78 % (ref 42–75)
NRBC BLD AUTO-RTO: 0 /100 WBCS
PLATELET # BLD AUTO: 208 THOUSANDS/UL (ref 149–390)
PMV BLD AUTO: 8.4 FL (ref 8.6–11.7)
POTASSIUM SERPL-SCNC: 4 MMOL/L (ref 3.5–5.5)
PROT SERPL-MCNC: 7.2 G/DL (ref 6.4–8.9)
PROTHROMBIN TIME: 60 SECONDS (ref 10.2–13)
RBC # BLD AUTO: 4.13 MILLION/UL (ref 4.3–5.9)
SODIUM SERPL-SCNC: 137 MMOL/L (ref 134–143)
TROPONIN I SERPL-MCNC: <0.03 NG/ML
WBC # BLD AUTO: 9 THOUSAND/UL (ref 4.8–10.8)

## 2018-11-08 PROCEDURE — 94760 N-INVAS EAR/PLS OXIMETRY 1: CPT

## 2018-11-08 PROCEDURE — 99285 EMERGENCY DEPT VISIT HI MDM: CPT

## 2018-11-08 PROCEDURE — 84484 ASSAY OF TROPONIN QUANT: CPT | Performed by: EMERGENCY MEDICINE

## 2018-11-08 PROCEDURE — 87040 BLOOD CULTURE FOR BACTERIA: CPT | Performed by: EMERGENCY MEDICINE

## 2018-11-08 PROCEDURE — 85025 COMPLETE CBC W/AUTO DIFF WBC: CPT | Performed by: EMERGENCY MEDICINE

## 2018-11-08 PROCEDURE — 83605 ASSAY OF LACTIC ACID: CPT | Performed by: EMERGENCY MEDICINE

## 2018-11-08 PROCEDURE — 1123F ACP DISCUSS/DSCN MKR DOCD: CPT | Performed by: INTERNAL MEDICINE

## 2018-11-08 PROCEDURE — 80053 COMPREHEN METABOLIC PANEL: CPT | Performed by: EMERGENCY MEDICINE

## 2018-11-08 PROCEDURE — 87631 RESP VIRUS 3-5 TARGETS: CPT | Performed by: EMERGENCY MEDICINE

## 2018-11-08 PROCEDURE — 83735 ASSAY OF MAGNESIUM: CPT | Performed by: EMERGENCY MEDICINE

## 2018-11-08 PROCEDURE — 71045 X-RAY EXAM CHEST 1 VIEW: CPT

## 2018-11-08 PROCEDURE — 85610 PROTHROMBIN TIME: CPT | Performed by: EMERGENCY MEDICINE

## 2018-11-08 PROCEDURE — 36415 COLL VENOUS BLD VENIPUNCTURE: CPT | Performed by: EMERGENCY MEDICINE

## 2018-11-08 PROCEDURE — 94640 AIRWAY INHALATION TREATMENT: CPT

## 2018-11-08 PROCEDURE — 82553 CREATINE MB FRACTION: CPT | Performed by: EMERGENCY MEDICINE

## 2018-11-08 PROCEDURE — 83880 ASSAY OF NATRIURETIC PEPTIDE: CPT | Performed by: EMERGENCY MEDICINE

## 2018-11-08 PROCEDURE — 82550 ASSAY OF CK (CPK): CPT | Performed by: EMERGENCY MEDICINE

## 2018-11-08 PROCEDURE — 85730 THROMBOPLASTIN TIME PARTIAL: CPT | Performed by: EMERGENCY MEDICINE

## 2018-11-08 PROCEDURE — 93005 ELECTROCARDIOGRAM TRACING: CPT

## 2018-11-08 PROCEDURE — 96375 TX/PRO/DX INJ NEW DRUG ADDON: CPT

## 2018-11-08 PROCEDURE — 96365 THER/PROPH/DIAG IV INF INIT: CPT

## 2018-11-08 RX ORDER — IPRATROPIUM BROMIDE AND ALBUTEROL SULFATE 2.5; .5 MG/3ML; MG/3ML
3 SOLUTION RESPIRATORY (INHALATION)
Status: DISCONTINUED | OUTPATIENT
Start: 2018-11-09 | End: 2018-11-08

## 2018-11-08 RX ORDER — BIOTIN 1 MG
1000 TABLET ORAL DAILY
Status: ON HOLD | COMMUNITY
End: 2019-01-23 | Stop reason: CLARIF

## 2018-11-08 RX ORDER — IPRATROPIUM BROMIDE AND ALBUTEROL SULFATE 2.5; .5 MG/3ML; MG/3ML
3 SOLUTION RESPIRATORY (INHALATION) ONCE
Status: COMPLETED | OUTPATIENT
Start: 2018-11-08 | End: 2018-11-08

## 2018-11-08 RX ORDER — FUROSEMIDE 10 MG/ML
20 INJECTION INTRAMUSCULAR; INTRAVENOUS ONCE
Status: COMPLETED | OUTPATIENT
Start: 2018-11-08 | End: 2018-11-08

## 2018-11-08 RX ADMIN — FUROSEMIDE 20 MG: 10 INJECTION, SOLUTION INTRAVENOUS at 23:27

## 2018-11-08 RX ADMIN — CEFTRIAXONE 1000 MG: 1 INJECTION, SOLUTION INTRAVENOUS at 22:48

## 2018-11-08 RX ADMIN — IPRATROPIUM BROMIDE AND ALBUTEROL SULFATE 3 ML: .5; 3 SOLUTION RESPIRATORY (INHALATION) at 23:17

## 2018-11-08 RX ADMIN — AZITHROMYCIN FOR INJECTION INJECTION, POWDER, LYOPHILIZED, FOR SOLUTION 500 MG: 500 INJECTION INTRAVENOUS at 23:44

## 2018-11-09 ENCOUNTER — APPOINTMENT (INPATIENT)
Dept: NON INVASIVE DIAGNOSTICS | Facility: HOSPITAL | Age: 83
DRG: 293 | End: 2018-11-09
Payer: MEDICARE

## 2018-11-09 PROBLEM — I50.9 CONGESTIVE HEART FAILURE (CHF) (HCC): Status: ACTIVE | Noted: 2018-11-09

## 2018-11-09 PROBLEM — D68.9 COAGULOPATHY (HCC): Status: ACTIVE | Noted: 2018-11-09

## 2018-11-09 PROBLEM — I48.20 CHRONIC ATRIAL FIBRILLATION (HCC): Chronic | Status: ACTIVE | Noted: 2018-11-09

## 2018-11-09 LAB
ALBUMIN SERPL BCP-MCNC: 3.9 G/DL (ref 3.5–5.7)
ALP SERPL-CCNC: 95 U/L (ref 55–165)
ALT SERPL W P-5'-P-CCNC: 53 U/L (ref 7–52)
ANION GAP SERPL CALCULATED.3IONS-SCNC: 9 MMOL/L (ref 4–13)
AST SERPL W P-5'-P-CCNC: 39 U/L (ref 13–39)
ATRIAL RATE: 49 BPM
ATRIAL RATE: 60 BPM
BASOPHILS # BLD AUTO: 0 THOUSANDS/ΜL (ref 0–0.1)
BASOPHILS NFR BLD AUTO: 1 % (ref 0–2)
BILIRUB SERPL-MCNC: 0.8 MG/DL (ref 0.2–1)
BILIRUB UR QL STRIP: NEGATIVE
BUN SERPL-MCNC: 35 MG/DL (ref 7–25)
CALCIUM SERPL-MCNC: 9.1 MG/DL (ref 8.6–10.5)
CHLORIDE SERPL-SCNC: 104 MMOL/L (ref 98–107)
CHOLEST SERPL-MCNC: 108 MG/DL (ref 0–200)
CLARITY UR: CLEAR
CO2 SERPL-SCNC: 26 MMOL/L (ref 21–31)
COLOR UR: NORMAL
CREAT SERPL-MCNC: 1.15 MG/DL (ref 0.7–1.3)
EOSINOPHIL # BLD AUTO: 0.1 THOUSAND/ΜL (ref 0–0.61)
EOSINOPHIL NFR BLD AUTO: 2 % (ref 0–5)
ERYTHROCYTE [DISTWIDTH] IN BLOOD BY AUTOMATED COUNT: 15.2 % (ref 11.5–14.5)
FLUAV AG SPEC QL: NORMAL
FLUBV AG SPEC QL: NORMAL
GFR SERPL CREATININE-BSD FRML MDRD: 57 ML/MIN/1.73SQ M
GLUCOSE SERPL-MCNC: 110 MG/DL (ref 65–99)
GLUCOSE UR STRIP-MCNC: NEGATIVE MG/DL
HCT VFR BLD AUTO: 36.7 % (ref 36.5–49.3)
HDLC SERPL-MCNC: 40 MG/DL (ref 40–60)
HGB BLD-MCNC: 11.7 G/DL (ref 14–18)
HGB UR QL STRIP.AUTO: NEGATIVE
INR PPP: 5.73 (ref 0.9–1.5)
KETONES UR STRIP-MCNC: NEGATIVE MG/DL
LDLC SERPL CALC-MCNC: 53 MG/DL (ref 75–193)
LEUKOCYTE ESTERASE UR QL STRIP: NEGATIVE
LYMPHOCYTES # BLD AUTO: 1.1 THOUSANDS/ΜL (ref 0.6–4.47)
LYMPHOCYTES NFR BLD AUTO: 15 % (ref 21–51)
MCH RBC QN AUTO: 28.9 PG (ref 26–34)
MCHC RBC AUTO-ENTMCNC: 31.9 G/DL (ref 31–37)
MCV RBC AUTO: 91 FL (ref 81–99)
MONOCYTES # BLD AUTO: 0.6 THOUSAND/ΜL (ref 0.17–1.22)
MONOCYTES NFR BLD AUTO: 9 % (ref 2–12)
NEUTROPHILS # BLD AUTO: 5.2 THOUSANDS/ΜL (ref 1.4–6.5)
NEUTS SEG NFR BLD AUTO: 74 % (ref 42–75)
NITRITE UR QL STRIP: NEGATIVE
NONHDLC SERPL-MCNC: 68 MG/DL
NRBC BLD AUTO-RTO: 0 /100 WBCS
PH UR STRIP.AUTO: 5 [PH] (ref 5–8)
PLATELET # BLD AUTO: 194 THOUSANDS/UL (ref 149–390)
PMV BLD AUTO: 8.3 FL (ref 8.6–11.7)
POTASSIUM SERPL-SCNC: 3.6 MMOL/L (ref 3.5–5.5)
PROT SERPL-MCNC: 6.6 G/DL (ref 6.4–8.9)
PROT UR STRIP-MCNC: NEGATIVE MG/DL
PROTHROMBIN TIME: 67.6 SECONDS (ref 10.2–13)
QRS AXIS: -9 DEGREES
QRS AXIS: 5 DEGREES
QRSD INTERVAL: 90 MS
QRSD INTERVAL: 92 MS
QT INTERVAL: 428 MS
QT INTERVAL: 442 MS
QTC INTERVAL: 448 MS
QTC INTERVAL: 483 MS
RBC # BLD AUTO: 4.04 MILLION/UL (ref 4.3–5.9)
RSV B RNA SPEC QL NAA+PROBE: NORMAL
SODIUM SERPL-SCNC: 139 MMOL/L (ref 134–143)
SP GR UR STRIP.AUTO: 1.01 (ref 1–1.03)
T WAVE AXIS: 10 DEGREES
T WAVE AXIS: 29 DEGREES
TRIGL SERPL-MCNC: 74 MG/DL (ref 44–166)
TROPONIN I SERPL-MCNC: <0.03 NG/ML
TROPONIN I SERPL-MCNC: <0.03 NG/ML
UROBILINOGEN UR QL STRIP.AUTO: 0.2 E.U./DL
VENTRICULAR RATE: 66 BPM
VENTRICULAR RATE: 72 BPM
WBC # BLD AUTO: 7.1 THOUSAND/UL (ref 4.8–10.8)

## 2018-11-09 PROCEDURE — 80053 COMPREHEN METABOLIC PANEL: CPT | Performed by: INTERNAL MEDICINE

## 2018-11-09 PROCEDURE — 93010 ELECTROCARDIOGRAM REPORT: CPT | Performed by: INTERNAL MEDICINE

## 2018-11-09 PROCEDURE — 99222 1ST HOSP IP/OBS MODERATE 55: CPT | Performed by: INTERNAL MEDICINE

## 2018-11-09 PROCEDURE — 93306 TTE W/DOPPLER COMPLETE: CPT

## 2018-11-09 PROCEDURE — 94760 N-INVAS EAR/PLS OXIMETRY 1: CPT

## 2018-11-09 PROCEDURE — 84484 ASSAY OF TROPONIN QUANT: CPT | Performed by: PHYSICIAN ASSISTANT

## 2018-11-09 PROCEDURE — 85610 PROTHROMBIN TIME: CPT | Performed by: INTERNAL MEDICINE

## 2018-11-09 PROCEDURE — 80061 LIPID PANEL: CPT | Performed by: PHYSICIAN ASSISTANT

## 2018-11-09 PROCEDURE — 85025 COMPLETE CBC W/AUTO DIFF WBC: CPT | Performed by: PHYSICIAN ASSISTANT

## 2018-11-09 PROCEDURE — 81003 URINALYSIS AUTO W/O SCOPE: CPT | Performed by: EMERGENCY MEDICINE

## 2018-11-09 PROCEDURE — 93005 ELECTROCARDIOGRAM TRACING: CPT

## 2018-11-09 PROCEDURE — 93306 TTE W/DOPPLER COMPLETE: CPT | Performed by: INTERNAL MEDICINE

## 2018-11-09 RX ORDER — NIACIN 500 MG/1
1500 TABLET, EXTENDED RELEASE ORAL
Status: DISCONTINUED | OUTPATIENT
Start: 2018-11-09 | End: 2018-11-11 | Stop reason: HOSPADM

## 2018-11-09 RX ORDER — FUROSEMIDE 10 MG/ML
80 INJECTION INTRAMUSCULAR; INTRAVENOUS
Status: DISCONTINUED | OUTPATIENT
Start: 2018-11-09 | End: 2018-11-11

## 2018-11-09 RX ORDER — ASPIRIN 81 MG/1
81 TABLET, CHEWABLE ORAL DAILY
Status: DISCONTINUED | OUTPATIENT
Start: 2018-11-09 | End: 2018-11-11 | Stop reason: HOSPADM

## 2018-11-09 RX ORDER — FUROSEMIDE 10 MG/ML
40 INJECTION INTRAMUSCULAR; INTRAVENOUS 2 TIMES DAILY
Status: DISCONTINUED | OUTPATIENT
Start: 2018-11-09 | End: 2018-11-09

## 2018-11-09 RX ORDER — CALCIUM CARBONATE 500(1250)
1 TABLET ORAL
Status: DISCONTINUED | OUTPATIENT
Start: 2018-11-09 | End: 2018-11-11 | Stop reason: HOSPADM

## 2018-11-09 RX ORDER — PREDNISOLONE ACETATE 10 MG/ML
1 SUSPENSION/ DROPS OPHTHALMIC DAILY
Status: DISCONTINUED | OUTPATIENT
Start: 2018-11-09 | End: 2018-11-11 | Stop reason: HOSPADM

## 2018-11-09 RX ORDER — ATROPINE SULFATE 10 MG/ML
1 SOLUTION/ DROPS OPHTHALMIC DAILY
Status: DISCONTINUED | OUTPATIENT
Start: 2018-11-09 | End: 2018-11-11 | Stop reason: HOSPADM

## 2018-11-09 RX ORDER — TIMOLOL MALEATE 5 MG/ML
1 SOLUTION/ DROPS OPHTHALMIC 2 TIMES DAILY
Status: DISCONTINUED | OUTPATIENT
Start: 2018-11-09 | End: 2018-11-11 | Stop reason: HOSPADM

## 2018-11-09 RX ORDER — FUROSEMIDE 10 MG/ML
80 INJECTION INTRAMUSCULAR; INTRAVENOUS
Status: DISCONTINUED | OUTPATIENT
Start: 2018-11-09 | End: 2018-11-09

## 2018-11-09 RX ORDER — TRAMADOL HYDROCHLORIDE 50 MG/1
50 TABLET ORAL EVERY 6 HOURS PRN
Status: DISCONTINUED | OUTPATIENT
Start: 2018-11-09 | End: 2018-11-11 | Stop reason: HOSPADM

## 2018-11-09 RX ORDER — AMLODIPINE BESYLATE 5 MG/1
10 TABLET ORAL DAILY
Status: DISCONTINUED | OUTPATIENT
Start: 2018-11-09 | End: 2018-11-11 | Stop reason: HOSPADM

## 2018-11-09 RX ORDER — ACETAMINOPHEN 325 MG/1
650 TABLET ORAL EVERY 6 HOURS PRN
Status: DISCONTINUED | OUTPATIENT
Start: 2018-11-09 | End: 2018-11-11 | Stop reason: HOSPADM

## 2018-11-09 RX ORDER — ONDANSETRON 2 MG/ML
4 INJECTION INTRAMUSCULAR; INTRAVENOUS EVERY 6 HOURS PRN
Status: DISCONTINUED | OUTPATIENT
Start: 2018-11-09 | End: 2018-11-11 | Stop reason: HOSPADM

## 2018-11-09 RX ORDER — ATORVASTATIN CALCIUM 40 MG/1
40 TABLET, FILM COATED ORAL DAILY
Status: DISCONTINUED | OUTPATIENT
Start: 2018-11-09 | End: 2018-11-11 | Stop reason: HOSPADM

## 2018-11-09 RX ORDER — MELATONIN
1000 DAILY
Status: DISCONTINUED | OUTPATIENT
Start: 2018-11-09 | End: 2018-11-11 | Stop reason: HOSPADM

## 2018-11-09 RX ORDER — LOSARTAN POTASSIUM 50 MG/1
50 TABLET ORAL DAILY
Status: DISCONTINUED | OUTPATIENT
Start: 2018-11-09 | End: 2018-11-11 | Stop reason: HOSPADM

## 2018-11-09 RX ADMIN — TIMOLOL MALEATE 1 DROP: 5 SOLUTION OPHTHALMIC at 10:01

## 2018-11-09 RX ADMIN — LOSARTAN POTASSIUM 50 MG: 50 TABLET, FILM COATED ORAL at 10:03

## 2018-11-09 RX ADMIN — ATORVASTATIN CALCIUM 40 MG: 40 TABLET, FILM COATED ORAL at 10:03

## 2018-11-09 RX ADMIN — FUROSEMIDE 80 MG: 10 INJECTION, SOLUTION INTRAVENOUS at 16:59

## 2018-11-09 RX ADMIN — AMLODIPINE BESYLATE 10 MG: 5 TABLET ORAL at 10:02

## 2018-11-09 RX ADMIN — PREDNISOLONE ACETATE 1 DROP: 10 SUSPENSION/ DROPS OPHTHALMIC at 10:02

## 2018-11-09 RX ADMIN — CALCIUM 1 TABLET: 500 TABLET ORAL at 10:02

## 2018-11-09 RX ADMIN — VITAMIN D, TAB 1000IU (100/BT) 1000 UNITS: 25 TAB at 10:02

## 2018-11-09 RX ADMIN — Medication 81 MG: at 10:03

## 2018-11-09 RX ADMIN — FUROSEMIDE 80 MG: 10 INJECTION, SOLUTION INTRAVENOUS at 10:49

## 2018-11-09 RX ADMIN — ATROPINE SULFATE 1 DROP: 10 SOLUTION/ DROPS OPHTHALMIC at 10:01

## 2018-11-09 RX ADMIN — TIMOLOL MALEATE 1 DROP: 5 SOLUTION OPHTHALMIC at 17:00

## 2018-11-09 RX ADMIN — NIACIN 1500 MG: 500 TABLET, FILM COATED, EXTENDED RELEASE ORAL at 22:30

## 2018-11-09 NOTE — UTILIZATION REVIEW
Initial Clinical Review    Admission: Date/Time/Statement: 11/8/18 @ 2333  Orders Placed This Encounter   Procedures    Inpatient Admission (expected length of stay for this patient is greater than two midnights)     Standing Status:   Standing     Number of Occurrences:   1     Order Specific Question:   Admitting Physician     Answer:   Shanti Galvan [[id number]]     Order Specific Question:   Level of Care     Answer:   Med Surg [[id number]]     Order Specific Question:   Estimated length of stay     Answer:   More than 2 Midnights     Order Specific Question:   Certification     Answer:   I certify that inpatient services are medically necessary for this patient for a duration of greater than two midnights  See H&P and MD Progress Notes for additional information about the patient's course of treatment  ED: Date/Time/Mode of Arrival:   ED Arrival Information     Expected Arrival Acuity Means of Arrival Escorted By Service Admission Type    - 11/8/2018 22:04 Emergent Ambulance [facility] General Medicine Emergency    Arrival Complaint    RESPIRATORY:  SOB        Chief Complaint:   Chief Complaint   Patient presents with    Shortness of Breath     Pt states he's been SOB since yesterday that is worse with exertion  Per EMS, pt was 87% RA at home  Pt denies CP or dizziness  History of Illness:   Steve Carrizales is a 80 y o  male who presents with dyspnea x2 days  Patient has extensive cardiac history to include coronary artery bypass graft in 1974 and subsequent cardiac catheterization with stents  Patient follows with Dr Brittnee Downs from Cardiology in [facility] who he last saw 1 month ago for routine follow-up  Patient denies a history of congestive heart failure he is on no diuretics and denies any dietary indiscretion  Patient denies any chest pain he does complain that his shortness of breath has been progressively worsening over the past 2 days the point of dyspnea with minimal activity  Patient does report some swelling of his left lower extremity which is chronic and states that he does not weigh himself on a regular basis  Patient reports that his last echocardiogram was earlier this year and his last stress test was last 2 both of these were reportedly negative      Patient complains of increasing dyspnea to the point of dyspnea with minimal activity, orthopnea, and a cough which is nonproductive  ED Vital Signs:   ED Triage Vitals   Temperature Pulse Respirations Blood Pressure SpO2   18   98 °F (36 7 °C) 72 (!) 24 152/69 (!) 86 %      Temp Source Heart Rate Source Patient Position - Orthostatic VS BP Location FiO2 (%)   18 0047 18 --   Temporal Monitor Sitting Right arm       Pain Score       18       No Pain        Wt Readings from Last 1 Encounters:   18 86 6 kg (191 lb)     O2 @ 3L via NC    Abnormal Labs/Diagnostic Test Results:   WBC Thousand/uL 9 00   HEMOGLOBIN g/dL 12 1*   HEMATOCRIT % 37 6   PLATELETS Thousands/uL 208   Sodium  137  POTASSIUM mmol/L 4 0   CHLORIDE mmol/L 104   CO2 mmol/L 26   BUN mg/dL 35*   CREATININE mg/dL 1 17   CALCIUM mg/dL 9 5   ALK PHOS U/L 104   ALT U/L 57*   AST U/L 44*   INR 5 09   (H) pg/mL     Troponin I <0 03 ng/mL     Chest X:    Pulmonary vascular congestion is seen  Small right pleural effusion is noted  EC, A  Fib    ED Treatment:   Medication Administration from 2018 to 2018 0045       Date/Time Order Dose Route Action Action by Comments     2018 2344 azithromycin (ZITHROMAX) 500 mg in sodium chloride 0 9 % 250 mL IVPB 500 mg Intravenous [place] Magda Hernández RN Error "No orders" Confirmed dose with supervisor       2018 2320 cefTRIAXone (ROCEPHIN) IVPB (premix) 1,000 mg 0 mg Intravenous Stopped Carmela Lipscomb RN      2018 2248 cefTRIAXone (ROCEPHIN) IVPB (premix) 1,000 mg 1,000 mg Intravenous [address] [place], [address]      [id number]/9851 2310 ipratropium-albuterol (DUO-NEB) 0 5-2 5 mg/3 mL inhalation solution 3 mL 3 mL Nebulization Given Fransisco Schafer, RT      11/08/2018 2328 furosemide (LASIX) injection 20 mg 20 mg Intravenous Given Cora Fernandez RN           Past Medical/Surgical History: Active Ambulatory Problems     Diagnosis Date Noted    Malignant melanoma of left foot ([facility] ) 11/02/2018    Cerebrovascular accident ([place] [address] ) 04/25/2017    Cholelithiasis without obstruction 04/25/2017    Coronary arteriosclerosis in native artery 01/31/2017    Hypertensive disorder 01/31/2017    Idiopathic progressive polyneuropathy 04/25/2017    Osteoarthritis 04/25/2017    Osteoporosis 04/25/2017     Past Medical History:   Diagnosis Date    Bleeding risk due to Coumadin and aspirin     Cancer ([place] [address] )     Cataract     Coronary artery disease     Glaucoma     Hyperlipidemia     Hypertension        Admitting Diagnosis: SOB (shortness of breath) [R06 02]  Supratherapeutic INR [R79 1]  Dyspnea, unspecified type [R06 00]  Congestive heart failure, unspecified HF chronicity, unspecified heart failure type ([place] [address] ) [I50 9]    Age/Sex: 80 y o  male    Assessment/Plan:   * Congestive heart failure (CHF) ([place] [address] )   Assessment & Plan     Will admit to telemetry, give Lasix 40 mg IV b i d , trend cardiac enzymes obtain repeat EKG and echocardiogram as well as consult cardiology in a m      Will continue pre-hospital Norvasc, aspirin, Lipitor and Cozaar  Patient denies history of previous congestive heart failure but denies chest pain or dietary indiscretion    This is presumed and diastolic awaiting results of echocardiogram      Patient follows with cardiology Dr Margaux Sanders in [facility] who he last saw approximately 1 month ago for routine follow-up he states that his last echocardiogram  earlier this year and reportedly negative with last stress test last year and also reportedly negative       Coagulopathy ([facility] )   Assessment & Plan     Patient is on chronic Coumadin 5 mg for underlying atrial fibrillation will hold at this time and obtain daily PT INR      Chronic atrial fibrillation [facility])   Assessment & Plan     Patient is on chronic Coumadin therapy for same is INR supratherapeutic will hold and repeat PT INR daily      Essential hypertension   Assessment & Plan     Continue pre-hospital Norvasc 10 mg p  o  daily, Cozaar 50 mg p o  daily and give Lasix 40 mg IV b i d       Hyperlipidemia   Assessment & Plan     Continue pre-hospital Lipitor 40 and Niaspan CR 1500 mg p o  daily      Vitamin D deficiency   Assessment & Plan     Continue pre-hospital vitamin-D 1000 units p o  daily      Overweight   Assessment & Plan     Patient's current BMI is 28 98 will continue supportive care      Neovascular glaucoma of left eye   Assessment & Plan     Continue pre-hospital eyedrops      Blind right eye   Assessment & Plan     Continue pre-hospital eyedrops         VTE Prophylaxis: Heparin  Anticipated Length of Stay:  Patient will be admitted on an Inpatient basis with an anticipated length of stay of  > 2 midnights     Justification for Hospital Stay:   New onset presumed diastolic congestive heart failure    Admission Orders:  Scheduled Meds:   Current Facility-Administered Medications:  acetaminophen 650 mg Oral Q6H PRN Minerva Ford PA-C   amLODIPine 10 mg Oral Daily Minerva Ford PA-C   aspirin 81 mg Oral Daily Minerva Ford PA-C   atorvastatin 40 mg Oral Daily Minerva Ford PA-C   atropine 1 drop Right Eye Daily Minerva Ford PA-C   calcium carbonate 1 tablet Oral Daily With Breakfast Minerva Ford PA-C   cholecalciferol 1,000 Units Oral Daily Minerva Ford PA-C   furosemide 80 mg Intravenous BID (diuretic) Jina Parks MD   losartan 50 mg Oral Daily Minerva Ford PA-C   niacin 1,500 mg Oral HS Minerva Ford PA-C   ondansetron 4 mg Intravenous Q6H PRN Minerva Ford PA-C   prednisoLONE acetate 1 drop Right Eye Daily Nava Chowdhury PA-C   timolol 1 drop Left Eye BID Nava Chowdhury PA-C   traMADol 50 mg Oral Q6H PRN Nava Chowdhury PA-C     O2 @ 3L via NC  --------------------------------------------------------------------------------------------------------    11/09/2018  Consult cardio  Assessment/ Plan:  1  Acute diastolic heart failure   · He was not previously on diuretics as an outpatient  Will increase lasix to 80mg IV BID   · RV dilated with normal function on echo  No clinical evidence of PE, especially given supratherapeutic INR      2  Atrial fibrillation   · Rate controlled, off AV yessenia blockers   · Coumadin held secondary to supratherapeutic INR  · Continue to monitor on telemetry     3  Supratherapeutic INR   · INR 5 73 this morning  Coumadin has been held       4   CAD, s/p remote CABG and PCI  · Continue ASA, statin  · Rate controlled off BB

## 2018-11-09 NOTE — CONSULTS
Consultation - Cardiology   Cassie Sims 80 y o  male MRN: [id number]  Unit/Bed#: -01 Encounter: [id number]  11/09/18  9:11 AM    Assessment/ Plan:  1  Acute diastolic heart failure   · He was not previously on diuretics as an outpatient  Will increase lasix to 80mg IV BID   · RV dilated with normal function on echo  No clinical evidence of PE, especially given supratherapeutic INR  2  Atrial fibrillation   · Rate controlled, off AV yessenia blockers   · Coumadin held secondary to supratherapeutic INR  · Continue to monitor on telemetry    3  Supratherapeutic INR   · INR 5 73 this morning  Coumadin has been held  4  CAD, s/p remote CABG and PCI  · Continue ASA, statin  · Rate controlled off BB     History of Present Illness   Physician Requesting Consult: Art Meier MD  Reason for Consult / Principal Problem: Dyspnea  HPI: Cassie Sims is a 80y o  year old male who presents with dyspnea  He has a history of CAD (s/p CABG in 1974, 2 stents in 1990s), chronic afib (on coumadin), HTN, and HLD  His cardiologist is Dr Sloane Moore in [place]  He states that he has had worsening dyspnea for the past 2-3 days which now occurs with very minimal exertion  He also notes some chronic lower extremity edema which has become worse  He has not required Lasix as an outpatient  He admits to dietary salt indiscretion, but has not had any more salt than usual in the last few days  He denies prior similar episodes, and states that he is usually able to ambulate without difficulty  He reports feeling somewhat improved since admission after receiving Lasix and being placed on oxygen  He denies recent viral illness  He denies known history of CHF  He denies associated chest pain, palpitations, PND, near-syncope and syncope  He takes 5mg of Coumadin daily  His INR is checked every 2 weeks and he reports that it has been in the therapeutic range  He denies overt bleeding, epistaxis, and dark stools      He has a remote smoking history and quit in 1974  He has an occasional glass of red wine  No recreational drug use  Inpatient consult to Cardiology  Consult performed by: Renetta Servin  Consult ordered by: Homero Sprague        EKG and telemetry: Atrial fibrillation with controlled ventricular response    Review of Systems: a 12 point review of systems was conducted and is negative except for as mentioned in the HPI    Review of Systems    Historical Information   Past Medical History:   Diagnosis Date    Bleeding risk due to Coumadin and aspirin     Cancer ([address] )     LT foot    Cataract     Coronary artery disease     Glaucoma     right eye    Hyperlipidemia     Hypertension      Past Surgical History:   Procedure Laterality Date    CARDIAC SURGERY      1974 and [address]'X    CORONARY STENT PLACEMENT  1990's    pt has two coronary stents    JOINT REPLACEMENT Left     THR    ID KNEE SCOPE,MED/LAT MENISECTOMY Left 8/15/2018    Procedure: left KNEE ARTHROSCOPY with medial & lateral menisectomy, chondroplasty,synovectomy & injection;  Surgeon: Bev West DO;  Location: [facility] MAIN OR;  Service: Orthopedics     History   Alcohol Use No     History   Drug Use No     History   Smoking Status    Former Smoker    Packs/day: 1 50    Quit date: 1974   Smokeless Tobacco    Never Used       Family History:   Family History   Problem Relation Age of Onset    Heart disease Mother      Meds/Allergies   all current active meds have been reviewed  Allergies   Allergen Reactions    No Active Allergies      Objective   Vitals: Blood pressure 124/61, pulse 66, temperature 98 7 °F (37 1 °C), temperature source Tympanic, resp  rate 18, height 5' 7" (1 702 m), weight 86 6 kg (191 lb), SpO2 94 %  , Body mass index is 29 91 kg/m² , Orthostatic Blood Pressures      Most Recent Value   Blood Pressure  124/61 filed at 11/09/2018 8117   Patient Position - Orthostatic VS  Lying filed at 11/09/2018 4458        Systolic (46UBY), IZT:667 , Min:122 , RLI:941     Diastolic (60HZO), XOI:63, Min:57, Max:69      Intake/Output Summary (Last 24 hours) at 11/09/18 0911  Last data filed at 11/09/18 0755   Gross per 24 hour   Intake              410 ml   Output              750 ml   Net             -340 ml     Invasive Devices     Peripheral Intravenous Line            Peripheral IV 11/08/18 Left Antecubital 1 day                Physical Exam:  Physical Exam   Constitutional: He appears well-developed and well-nourished  No distress  HENT:   Head: Normocephalic and atraumatic  Neck: Neck supple  No JVD present  Cardiovascular: Normal rate, normal heart sounds and intact distal pulses  An irregularly irregular rhythm present  Exam reveals no gallop and no friction rub  No murmur heard  Midline sternotomy scar  Pulmonary/Chest: Effort normal and breath sounds normal  No respiratory distress  He has no rales  Musculoskeletal: He exhibits edema (2+ pitting edema in bilateral lower extremities  )  Skin: Skin is warm and dry  He is not diaphoretic  Psychiatric: He has a normal mood and affect       Lab Results:     Troponins:   Results from last 7 days  Lab Units 11/09/18 0444 11/09/18  0121 11/08/18  2233   CK TOTAL U/L  --   --  180   TROPONIN I ng/mL <0 03 <0 03 <0 03   CK MB INDEX %  --   --  3 2     CBC with diff:   Results from last 7 days  Lab Units 11/09/18  0443 11/08/18  2233   WBC Thousand/uL 7 10 9 00   HEMOGLOBIN g/dL 11 7* 12 1*   HEMATOCRIT % 36 7 37 6   MCV fL 91 91   PLATELETS Thousands/uL 194 208   MCH pg 28 9 29 2   MCHC g/dL 31 9 32 2   RDW % 15 2* 15 6*   MPV fL 8 3* 8 4*   NRBC AUTO /100 WBCs 0 0     CMP:   Results from last 7 days  Lab Units 11/09/18  0443 11/08/18  2233   POTASSIUM mmol/L 3 6 4 0   CHLORIDE mmol/L 104 104   CO2 mmol/L 26 26   BUN mg/dL 35* 35*   CREATININE mg/dL 1 15 1 17   CALCIUM mg/dL 9 1 9 5   AST U/L 39 44*   ALT U/L 53* 57*   ALK PHOS U/L 95 104   EGFR ml/min/1 73sq m 57 56     Counseling / Coordination of Care  Total floor / unit time spent today 60 minutes  Greater than 50% of total time was spent with the patient and / or family counseling and / or coordination of care  A description of the counseling / coordination of care: 30

## 2018-11-09 NOTE — PLAN OF CARE
DISCHARGE PLANNING     Discharge to home or other facility with appropriate resources Progressing        INFECTION - ADULT     Absence of fever/infection during neutropenic period Progressing        Knowledge Deficit     Patient/family/caregiver demonstrates understanding of disease process, treatment plan, medications, and discharge instructions Progressing        PAIN - ADULT     Verbalizes/displays adequate comfort level or baseline comfort level Progressing        Potential for Falls     Patient will remain free of falls Progressing        SAFETY ADULT     Maintain or return to baseline ADL function Progressing     Maintain or return mobility status to optimal level Progressing

## 2018-11-09 NOTE — PLAN OF CARE
Problem: DISCHARGE PLANNING - CARE MANAGEMENT  Goal: Discharge to post-acute care or home with appropriate resources  INTERVENTIONS:  - Conduct assessment to determine patient/family and health care team treatment goals, and need for post-acute services based on payer coverage, community resources, and patient preferences, and barriers to discharge  - Address psychosocial, clinical, and financial barriers to discharge as identified in assessment in conjunction with the patient/family and health care team  - Arrange appropriate level of post-acute services according to patient's   needs and preference and payer coverage in collaboration with the physician and health care team  - Communicate with and update the patient/family, physician, and health care team regarding progress on the discharge plan  - Arrange appropriate transportation to post-acute venues    D/c home  Outcome: Progressing

## 2018-11-09 NOTE — ED PROVIDER NOTES
History  Chief Complaint   Patient presents with    Shortness of Breath     Pt states he's been SOB since yesterday that is worse with exertion  Per EMS, pt was 87% RA at home  Pt denies CP or dizziness  49-year-old male with history of hyperlipidemia, malignant melanoma of the left foot, CAD status post CABG, hypertension, and atrial fibrillation presents for evaluation of shortness of breath  Patient reports symptoms x2 days starting initially with a dry nonproductive cough  Patient reports shortness of breath since that time both at rest and with exertion  Otherwise patient denies fevers, chills, chest pain, abdominal pain and lower extremity edema  History provided by:  Patient and EMS personnel   used: No        Prior to Admission Medications   Prescriptions Last Dose Informant Patient Reported? Taking?    CALCIUM PO   Yes Yes   Sig: Take 600 mg by mouth   Cholecalciferol (VITAMIN D3) 1000 units CAPS   Yes Yes   Sig: Take 1,000 Units by mouth daily   amLODIPine (NORVASC) 10 mg tablet   Yes Yes   Sig: 10 mg Daily   aspirin 81 MG tablet   Yes Yes   Sig: Take 81 mg by mouth daily   atorvastatin (LIPITOR) 40 mg tablet   Yes Yes   Sig: Take 40 mg by mouth daily   atropine (ISOPTO ATROPINE) 1 % ophthalmic solution  Self Yes Yes   Sig: Administer 1 drop to the right eye daily     losartan (COZAAR) 50 mg tablet   Yes Yes   Sig: Daily   niacin (NIASPAN) 750 MG CR tablet   Yes Yes   Sig: Take 1,500 mg by mouth daily at bedtime     prednisoLONE acetate (PRED FORTE) 1 % ophthalmic suspension  Self Yes No   Sig: Administer 1 drop to the right eye daily     timolol (TIMOPTIC) 0 5 % ophthalmic solution  Self Yes No   Sig: Administer 1 drop into the left eye 2 (two) times a day   traMADol (ULTRAM) 50 mg tablet   No No   Sig: Take 1 tablet (50 mg total) by mouth every 6 (six) hours as needed for moderate pain   warfarin (COUMADIN) 5 mg tablet  Self Yes Yes   Sig: Take 5 mg by mouth daily Facility-Administered Medications: None       Past Medical History:   Diagnosis Date    Bleeding risk due to Coumadin and aspirin     Cancer (HCC)     LT foot    Cataract     Coronary artery disease     Glaucoma     right eye    Hyperlipidemia     Hypertension        Past Surgical History:   Procedure Laterality Date    CARDIAC SURGERY      1974 and [address]'W    CORONARY STENT PLACEMENT  1990's    pt has two coronary stents    JOINT REPLACEMENT Left     THR    AL KNEE SCOPE,MED/LAT MENISECTOMY Left 8/15/2018    Procedure: left KNEE ARTHROSCOPY with medial & lateral menisectomy, chondroplasty,synovectomy & injection;  Surgeon: Cherry Barnett DO;  Location: [address] [place] [address] OR;  Service: Orthopedics       Family History   Problem Relation Age of Onset    Heart disease Mother      I have reviewed and agree with the history as documented  Social History   Substance Use Topics    Smoking status: Former Smoker     Packs/day: 1 50     Quit date: 1974    Smokeless tobacco: Never Used    Alcohol use No        Review of Systems   Constitutional: Negative for chills and fever  HENT: Negative for rhinorrhea and sore throat  Eyes: Negative for visual disturbance  Respiratory: Positive for cough and shortness of breath  Cardiovascular: Negative for chest pain and leg swelling  Gastrointestinal: Negative for abdominal pain, diarrhea, nausea and vomiting  Genitourinary: Negative for dysuria  Musculoskeletal: Negative for back pain and myalgias  Skin: Negative for rash  Neurological: Negative for dizziness and headaches  Psychiatric/Behavioral: Negative for confusion  All other systems reviewed and are negative  Physical Exam  Physical Exam   Constitutional: He is oriented to person, place, and time  He appears well-developed and well-nourished  HENT:   Nose: Nose normal    Mouth/Throat: Oropharynx is clear and moist  No oropharyngeal exudate     Eyes: Pupils are equal, round, and reactive to light  Conjunctivae and EOM are normal  No scleral icterus  Neck: Normal range of motion  Neck supple  No JVD present  No tracheal deviation present  Cardiovascular: Normal rate, regular rhythm and normal heart sounds  No murmur heard  Pulmonary/Chest: Effort normal  No respiratory distress  He has decreased breath sounds  He has no wheezes  He has no rales  + midline scar  Poor air movement   Abdominal: Soft  Bowel sounds are normal  There is no tenderness  There is no guarding  Musculoskeletal: Normal range of motion  He exhibits no edema or tenderness  Neurological: He is alert and oriented to person, place, and time  No cranial nerve deficit or sensory deficit  He exhibits normal muscle tone  5/5 motor, nl sens   Skin: Skin is warm and dry  Left foot with small lateral ulceration present on arrival   Psychiatric: He has a normal mood and affect  His behavior is normal    Nursing note and vitals reviewed        Vital Signs  ED Triage Vitals [11/08/18 2212]   Temperature Pulse Respirations Blood Pressure SpO2   98 °F (36 7 °C) 72 (!) 24 152/69 (!) 86 %      Temp Source Heart Rate Source Patient Position - Orthostatic VS BP Location FiO2 (%)   Temporal Monitor -- Right arm --      Pain Score       No Pain           Vitals:    11/08/18 2212   BP: 152/69   Pulse: 72       Visual Acuity      ED Medications  Medications   azithromycin (ZITHROMAX) 500 mg in sodium chloride 0 9 % 250 mL IVPB (500 mg Intravenous New Bag 11/8/18 2344)   cefTRIAXone (ROCEPHIN) IVPB (premix) 1,000 mg (0 mg Intravenous Stopped 11/8/18 2320)   ipratropium-albuterol (DUO-NEB) 0 5-2 5 mg/3 mL inhalation solution 3 mL (3 mL Nebulization Given 11/8/18 2317)   furosemide (LASIX) injection 20 mg (20 mg Intravenous Given 11/8/18 2327)       Diagnostic Studies  Results Reviewed     Procedure Component Value Units Date/Time    Troponin I [[id number]]     Lab Status:  No result Specimen:  Blood     CKMB [[id number]]  (Normal) Collected: 11/08/18 2233    Lab Status:  Final result Specimen:  Blood from Arm, Right Updated:  11/08/18 2328     CK-MB Index 3 2 %      CK-MB 5 8 ng/mL     B-Type Natriuretic Peptide [facility] ONLY) [[id number]]  (Abnormal) Collected:  11/08/18 2233    Lab Status:  Final result Specimen:  Blood from Arm, Right Updated:  11/08/18 2317      (H) pg/mL     Troponin I [[id number]]  (Normal) Collected:  11/08/18 2233    Lab Status:  Final result Specimen:  Blood from Arm, Right Updated:  11/08/18 2309     Troponin I <0 03 ng/mL     Lactic acid, plasma [[id number]]  (Normal) Collected:  11/08/18 2233    Lab Status:  Final result Specimen:  Blood from Arm, Right Updated:  11/08/18 2306     LACTIC ACID 1 5 mmol/L     Narrative:         Result may be elevated if tourniquet was used during collection  Comprehensive metabolic panel [[id number]]  (Abnormal) Collected:  11/08/18 2233    Lab Status:  Final result Specimen:  Blood from Arm, Right Updated:  11/08/18 2305     Sodium 137 mmol/L      Potassium 4 0 mmol/L      Chloride 104 mmol/L      CO2 26 mmol/L      ANION GAP 7 mmol/L      BUN 35 (H) mg/dL      Creatinine 1 17 mg/dL      Glucose 146 (H) mg/dL      Calcium 9 5 mg/dL      AST 44 (H) U/L      ALT 57 (H) U/L      Alkaline Phosphatase 104 U/L      Total Protein 7 2 g/dL      Albumin 4 3 g/dL      Total Bilirubin 1 10 (H) mg/dL      eGFR 56 ml/min/1 73sq m     Narrative:         National Kidney Disease Education Program recommendations are as follows:  GFR calculation is accurate only with a steady state creatinine  Chronic Kidney disease less than 60 ml/min/1 73 sq  meters  Kidney failure less than 15 ml/min/1 73 sq  meters      CK Total with Reflex CKMB [[id number]]  (Normal) Collected:  11/08/18 2233    Lab Status:  Final result Specimen:  Blood from Arm, Right Updated:  11/08/18 2305     Total  U/L     Magnesium [[id number]]  (Normal) Collected:  11/08/18 2233    Lab Status:  Final result Specimen:  Blood from Arm, Right Updated:  11/08/18 2305     Magnesium 2 6 mg/dL     Protime-INR [[id number]]  (Abnormal) Collected:  11/08/18 2233    Lab Status:  Final result Specimen:  Blood from Arm, Right Updated:  11/08/18 2303     Protime 60 0 (H) seconds      INR 5 09 (HH)    APTT [[id number]]  (Abnormal) Collected:  11/08/18 2233    Lab Status:  Final result Specimen:  Blood from Arm, Right Updated:  11/08/18 2303     PTT 55 (H) seconds     Rapid Influenza Screen with Reflex PCR (indicated for patients <2mo of age) [[id number]]  (Normal) Collected:  11/08/18 2237    Lab Status:  Final result Specimen:  Nasopharyngeal from Nasopharyngeal Swab Updated:  11/08/18 2302     Rapid Influenza A Ag Negative     Rapid Influenza B Ag Negative    Influenza A/B and RSV by PCR (Indicated for patients > 2 mo of age) [[id number]] Collected:  11/08/18 2237    Lab Status: In process Specimen:  Nasopharyngeal from Nasopharyngeal Swab Updated:  11/08/18 2302    CBC and differential [[id number]]  (Abnormal) Collected:  11/08/18 2233    Lab Status:  Final result Specimen:  Blood from Arm, Right Updated:  11/08/18 2248     WBC 9 00 Thousand/uL      RBC 4 13 (L) Million/uL      Hemoglobin 12 1 (L) g/dL      Hematocrit 37 6 %      MCV 91 fL      MCH 29 2 pg      MCHC 32 2 g/dL      RDW 15 6 (H) %      MPV 8 4 (L) fL      Platelets 507 Thousands/uL      nRBC 0 /100 WBCs      Neutrophils Relative 78 (H) %      Lymphocytes Relative 12 (L) %      Monocytes Relative 8 %      Eosinophils Relative 2 %      Basophils Relative 1 %      Neutrophils Absolute 7 00 (H) Thousands/µL      Lymphocytes Absolute 1 00 Thousands/µL      Monocytes Absolute 0 70 Thousand/µL      Eosinophils Absolute 0 20 Thousand/µL      Basophils Absolute 0 00 Thousands/µL     Blood culture #1 [[id number]] Collected:  11/08/18 2233    Lab Status: In process Specimen:  Blood from Arm, Right Updated:  11/08/18 2240    Blood culture #2 [[id number]] Collected:  11/08/18 2233    Lab Status:   In process Specimen:  Blood from Arm, Left Updated:  11/08/18 2240    UA w Reflex to Microscopic w Reflex to Culture [[id number]]     Lab Status:  No result Specimen:  Urine                  XR chest 1 view portable   Final Result by Rae Reid (11/08 2312)   Pulmonary vascular congestion is seen  Small right pleural effusion is   noted  Signed by Bradley Vargas MD                 Procedures  Procedures       Phone Contacts  ED Phone Contact    ED Course  ED Course as of Nov 09 0037   u Nov 08, 2018 2009 Patient seen examined at bedside  Labs, imaging and EKG ordered  On arrival patient satting 86% on room air  Patient with elevation of O2 sat 95% on 2 L nasal cannula  2313 Rate 66 BPM atrial fibrillation no ST segment elevations or depressions, compared to EKG 08/01/2018 AFib has replaced sinus rhythm  ECG 12 lead   2316 Labs reviewed, rapid influenza negative  Lactic acid 1 5  No leukocytosis WBC 9  Troponin is negative  Chest x-ray with congestion bilaterally  46 Discussed with patient's family result of labs and imaging  Patient with elevated  and evidence of pulmonary vascular congestion on chest x-ray  Patient reports last echocardiogram approximately 1 year prior  No prior echocardiogram on our records  Discussed plan for Lasix 20 mg IV and for admission to hospital service  Discussed with hospitalist patient's clinical condition and results of labs and imaging and plan for admission                MDM  CritCare Time    Disposition  Final diagnoses:   Congestive heart failure, unspecified HF chronicity, unspecified heart failure type (HCC)   Supratherapeutic INR   Dyspnea, unspecified type     Time reflects when diagnosis was documented in both MDM as applicable and the Disposition within this note     Time User Action Codes Description Comment    11/8/2018 11:29 PM Ceci Jones Add [I50 9] Congestive heart failure, unspecified HF chronicity, unspecified [facility] ([facility]     11/8/2018 11:29 PM Jefry Jones Add [R79 1] Supratherapeutic INR     11/8/2018 11:29 PM Jefry Jones Add [R06 00] Dyspnea, unspecified type       ED Disposition     ED Disposition Condition Comment    Admit  Case was discussed with Michael Michelle PA-C and the patient's admission status was agreed to be Admission Status: inpatient status to the service of Dr Em Aquino   Follow-up Information    None         Patient's Medications   Discharge Prescriptions    No medications on file     No discharge procedures on file      ED Provider  Electronically Signed by           Bertram Handley DO  11/09/18 8649

## 2018-11-09 NOTE — H&P
H&P- Dulce Maria Labs 3/5/1931, 80 y o  male MRN: [id number]    Unit/Bed#: ED 03 Encounter: [id number]    Primary Care Provider: Michelle Damico MD   Date and time admitted to hospital: 11/8/2018 10:04 PM        * Congestive heart failure (CHF) ([facility] [address] )   Assessment & Plan    Will admit to telemetry, give Lasix 40 mg IV b i d , trend cardiac enzymes obtain repeat EKG and echocardiogram as well as consult cardiology in a m  Will continue pre-hospital Norvasc, aspirin, Lipitor and Cozaar  Patient denies history of previous congestive heart failure but denies chest pain or dietary indiscretion  This is presumed and diastolic awaiting results of echocardiogram     Patient follows with cardiology Dr Marielos Lanier in [facility] who he last saw approximately 1 month ago for routine follow-up he states that his last echocardiogram  earlier this year and reportedly negative with last stress test last year and also reportedly negative  Coagulopathy ([facility] )   Assessment & Plan    Patient is on chronic Coumadin 5 mg for underlying atrial fibrillation will hold at this time and obtain daily PT INR     Chronic atrial fibrillation [facility])   Assessment & Plan    Patient is on chronic Coumadin therapy for same is INR supratherapeutic will hold and repeat PT INR daily     Essential hypertension   Assessment & Plan    Continue pre-hospital Norvasc 10 mg p  o  daily, Cozaar 50 mg p o  daily and give Lasix 40 mg IV b i d       Hyperlipidemia   Assessment & Plan    Continue pre-hospital Lipitor 40 and Niaspan CR 1500 mg p o  daily     Vitamin D deficiency   Assessment & Plan    Continue pre-hospital vitamin-D 1000 units p o  daily     Overweight   Assessment & Plan    Patient's current BMI is 28 98 will continue supportive care     Neovascular glaucoma of left eye   Assessment & Plan    Continue pre-hospital eyedrops     Blind right eye   Assessment & Plan    Continue pre-hospital eyedrops       VTE Prophylaxis: Heparin  Code Status:   Level 1  POLST: There is no POLST form on file for this patient (pre-hospital)  Discussion with family:  Daughter present at bedside and answered all questions    Anticipated Length of Stay:  Patient will be admitted on an Inpatient basis with an anticipated length of stay of  > 2 midnights  Justification for Hospital Stay:   New onset presumed diastolic congestive heart failure    Total Time for Visit, including Counseling / Coordination of Care: 45 minutes  Greater than 50% of this total time spent on direct patient counseling and coordination of care  Chief Complaint:     Dyspnea x2 days    History of Present Illness:    Raheem Rodriguez is a 80 y o  male who presents with dyspnea x2 days  Patient has extensive cardiac history to include coronary artery bypass graft in 1974 and subsequent cardiac catheterization with stents  Patient follows with Dr Lul Lozano from Cardiology in [facility] who he last saw 1 month ago for routine follow-up  Patient denies a history of congestive heart failure he is on no diuretics and denies any dietary indiscretion  Patient denies any chest pain he does complain that his shortness of breath has been progressively worsening over the past 2 days the point of dyspnea with minimal activity  Patient does report some swelling of his left lower extremity which is chronic and states that he does not weigh himself on a regular basis  Patient reports that his last echocardiogram was earlier this year and his last stress test was last 2 both of these were reportedly negative  Patient complains of increasing dyspnea to the point of dyspnea with minimal activity, orthopnea, and a cough which is nonproductive  Denies any chest pain and no palpitations, syncope or near syncope  Review of Systems:  Review of Systems   Constitutional: Negative for chills, fatigue and fever  Respiratory: Positive for cough and shortness of breath  Negative for chest tightness and wheezing  Cardiovascular: Positive for leg swelling  Negative for chest pain and palpitations  Gastrointestinal: Negative for diarrhea, nausea and vomiting  Neurological: Negative for dizziness, weakness and light-headedness  All other systems reviewed and are negative  Past Medical and Surgical History:   Past Medical History:   Diagnosis Date    Bleeding risk due to Coumadin and aspirin     Cancer (HCC)     LT foot    Cataract     Coronary artery disease     Glaucoma     right eye    Hyperlipidemia     Hypertension        Past Surgical History:   Procedure Laterality Date    CARDIAC SURGERY      1974 and 11302'O    CORONARY STENT PLACEMENT  1990's    pt has two coronary stents    JOINT REPLACEMENT Left     THR    ME KNEE SCOPE,MED/LAT MENISECTOMY Left 8/15/2018    Procedure: left KNEE ARTHROSCOPY with medial & lateral menisectomy, chondroplasty,synovectomy & injection;  Surgeon: Dimas Fernandez DO;  Location: [address] [place] [address] OR;  Service: Orthopedics       Meds/Allergies:  Prior to Admission medications    Medication Sig Start Date End Date Taking?  Authorizing Provider   amLODIPine (NORVASC) 10 mg tablet 10 mg Daily   Yes Historical Provider, MD   aspirin 81 MG tablet Take 81 mg by mouth daily   Yes Historical Provider, MD   atorvastatin (LIPITOR) 40 mg tablet Take 40 mg by mouth daily   Yes Historical Provider, MD   atropine (ISOPTO ATROPINE) 1 % ophthalmic solution Administer 1 drop to the right eye daily     Yes Historical Provider, MD   CALCIUM PO Take 600 mg by mouth   Yes Historical Provider, MD   Cholecalciferol (VITAMIN D3) 1000 units CAPS Take 1,000 Units by mouth daily   Yes Historical Provider, MD   losartan (COZAAR) 50 mg tablet Daily   Yes Historical Provider, MD   niacin (NIASPAN) 750 MG CR tablet Take 1,500 mg by mouth daily at bedtime     Yes Historical Provider, MD   warfarin (COUMADIN) 5 mg tablet Take 5 mg by mouth daily     Yes Historical Provider, MD   prednisoLONE acetate (PRED FORTE) 1 % ophthalmic suspension Administer 1 drop to the right eye daily      Historical Provider, MD   timolol (TIMOPTIC) 0 5 % ophthalmic solution Administer 1 drop into the left eye 2 (two) times a day    Historical Provider, MD   traMADol (ULTRAM) 50 mg tablet Take 1 tablet (50 mg total) by mouth every 6 (six) hours as needed for moderate pain 11/2/18   Aniceto Borden MD     I have reviewed home medications with patient personally  Allergies: Allergies   Allergen Reactions    No Active Allergies        Social History:  Marital Status:    Occupation:   Retired   Patient Pre-hospital Living Situation:   Resides at home alone  Patient Pre-hospital Level of Mobility:   Full without assist  Patient Pre-hospital Diet Restrictions:   None  Substance Use History:     History   Alcohol Use No     History   Smoking Status    Former Smoker    Packs/day: 1 50    Quit date: 1974   Smokeless Tobacco    Never Used     History   Drug Use No       Family History:  I have reviewed the patients family history    Physical Exam:   Vitals:   Blood Pressure: 152/69 (11/08/18 2212)  Pulse: 72 (11/08/18 2212)  Temperature: 98 °F (36 7 °C) (11/08/18 2212)  Temp Source: Temporal (11/08/18 2212)  Respirations: (!) 24 (11/08/18 2212)  Height: 5' 7" (170 2 cm) (11/08/18 2212)  Weight - Scale: 83 9 kg (185 lb) (11/08/18 2212)  SpO2: 93 % (11/08/18 2320)    Physical Exam   Constitutional: He is oriented to person, place, and time  He appears well-developed and well-nourished  HENT:   Head: Normocephalic and atraumatic  Mouth/Throat: No oropharyngeal exudate  Eyes: Pupils are equal, round, and reactive to light  EOM are normal  No scleral icterus  Neck: Normal range of motion  Neck supple  No JVD present  Cardiovascular: Normal rate, regular rhythm and normal heart sounds  No murmur heard  Pulmonary/Chest: Effort normal  No respiratory distress  He has no wheezes   He has no rales  Decreased breath sounds bilaterally throughout   Abdominal: Bowel sounds are normal  There is no tenderness  There is no rebound and no guarding  Musculoskeletal: Normal range of motion  He exhibits edema  2+ edema left lower extremity  Ulceration approximately 1 and 1/2 cm of the lateral foot with surrounding erythema and no discharge present upon admission   Lymphadenopathy:     He has no cervical adenopathy  Neurological: He is alert and oriented to person, place, and time  Skin: Skin is warm and dry  No rash noted  No erythema  Psychiatric: He has a normal mood and affect  His behavior is normal    Nursing note and vitals reviewed  Additional Data:   Lab Results: I have personally reviewed pertinent reports  Results from last 7 days  Lab Units 11/08/18  2233   WBC Thousand/uL 9 00   HEMOGLOBIN g/dL 12 1*   HEMATOCRIT % 37 6   PLATELETS Thousands/uL 208   NEUTROS PCT % 78*   LYMPHS PCT % 12*   MONOS PCT % 8   EOS PCT % 2       Results from last 7 days  Lab Units 11/08/18  2233   POTASSIUM mmol/L 4 0   CHLORIDE mmol/L 104   CO2 mmol/L 26   BUN mg/dL 35*   CREATININE mg/dL 1 17   CALCIUM mg/dL 9 5   ALK PHOS U/L 104   ALT U/L 57*   AST U/L 44*       Results from last 7 days  Lab Units 11/08/18  2233   INR  5 09*               Imaging: I have personally reviewed pertinent reports  XR chest 1 view portable   Final Result by Cherri Alfonso (11/08 2312)   Pulmonary vascular congestion is seen  Small right pleural effusion is   noted  Signed by Kingston Ruiz MD          EKG, Pathology, and Other Studies Reviewed on Admission:   · EKG:   Atrial fibrillation at a rate of 66    NetAccess/Epic Records Reviewed: Yes     ** Please Note: This note has been constructed using a voice recognition system   **

## 2018-11-09 NOTE — ASSESSMENT & PLAN NOTE
Patient is on chronic Coumadin therapy for same is INR supratherapeutic will hold and repeat PT INR daily

## 2018-11-09 NOTE — ASSESSMENT & PLAN NOTE
Will admit to telemetry, give Lasix 40 mg IV b i d , trend cardiac enzymes obtain repeat EKG and echocardiogram as well as consult cardiology in a m  Will continue pre-hospital Norvasc, aspirin, Lipitor and Cozaar  Patient denies history of previous congestive heart failure but denies chest pain or dietary indiscretion  This is presumed and diastolic awaiting results of echocardiogram     Patient follows with cardiology Dr Margaux Sanders in [facility] who he last saw approximately 1 month ago for routine follow-up he states that his last echocardiogram  earlier this year and reportedly negative with last stress test last year and also reportedly negative

## 2018-11-09 NOTE — ASSESSMENT & PLAN NOTE
Continue pre-hospital Norvasc 10 mg p  o  daily, Cozaar 50 mg p o  daily and give Lasix 40 mg IV b i d

## 2018-11-09 NOTE — ASSESSMENT & PLAN NOTE
Patient is on chronic Coumadin 5 mg for underlying atrial fibrillation will hold at this time and obtain daily PT INR

## 2018-11-09 NOTE — SOCIAL WORK
Chart reviewed by case management, pt is independent and drives, he care for himself, he lives alone, his children support him and his son will transport the pt home when stable  For d/c ,, the pt is on oxygen and he does not have home oxygen, pt will need to be assessed for the need for oxygen before d/c home, pt declined the need for hhc, cm will continue to follow and assess for any additional d/c needs , d/c plan was dicsussed a t d/c planning meeting, outpt cm referral was made for this pt  CM reviewed d/c planning process including the following: identifying help at home, patient preference for d/c planning needs, availability of treatment team to discuss questions or concerns patient and/or family may have regarding understanding medications and recognizing signs and symptoms once discharged  CM also encouraged patient to follow up with all recommended appointments after discharge  Patient advised of importance for patient and family to participate in managing patients medical well being

## 2018-11-10 PROBLEM — E87.6 HYPOKALEMIA: Status: ACTIVE | Noted: 2018-11-10

## 2018-11-10 LAB
ALBUMIN SERPL BCP-MCNC: 3.8 G/DL (ref 3.5–5.7)
ALP SERPL-CCNC: 87 U/L (ref 55–165)
ALT SERPL W P-5'-P-CCNC: 52 U/L (ref 7–52)
ANION GAP SERPL CALCULATED.3IONS-SCNC: 9 MMOL/L (ref 4–13)
AST SERPL W P-5'-P-CCNC: 39 U/L (ref 13–39)
BILIRUB SERPL-MCNC: 1 MG/DL (ref 0.2–1)
BUN SERPL-MCNC: 23 MG/DL (ref 7–25)
CALCIUM SERPL-MCNC: 9.2 MG/DL (ref 8.6–10.5)
CHLORIDE SERPL-SCNC: 98 MMOL/L (ref 98–107)
CO2 SERPL-SCNC: 33 MMOL/L (ref 21–31)
CREAT SERPL-MCNC: 1.15 MG/DL (ref 0.7–1.3)
ERYTHROCYTE [DISTWIDTH] IN BLOOD BY AUTOMATED COUNT: 15.2 % (ref 11.5–14.5)
GFR SERPL CREATININE-BSD FRML MDRD: 57 ML/MIN/1.73SQ M
GLUCOSE SERPL-MCNC: 76 MG/DL (ref 65–99)
HCT VFR BLD AUTO: 37.5 % (ref 36.5–49.3)
HGB BLD-MCNC: 12.1 G/DL (ref 14–18)
INR PPP: 4.16 (ref 0.9–1.5)
MCH RBC QN AUTO: 29.5 PG (ref 26–34)
MCHC RBC AUTO-ENTMCNC: 32.4 G/DL (ref 31–37)
MCV RBC AUTO: 91 FL (ref 81–99)
PLATELET # BLD AUTO: 209 THOUSANDS/UL (ref 149–390)
PMV BLD AUTO: 8.4 FL (ref 8.6–11.7)
POTASSIUM SERPL-SCNC: 3.4 MMOL/L (ref 3.5–5.5)
PROT SERPL-MCNC: 6.5 G/DL (ref 6.4–8.9)
PROTHROMBIN TIME: 48.9 SECONDS (ref 10.2–13)
RBC # BLD AUTO: 4.12 MILLION/UL (ref 4.3–5.9)
SODIUM SERPL-SCNC: 140 MMOL/L (ref 134–143)
WBC # BLD AUTO: 8.7 THOUSAND/UL (ref 4.8–10.8)

## 2018-11-10 PROCEDURE — G8980 MOBILITY D/C STATUS: HCPCS

## 2018-11-10 PROCEDURE — 80053 COMPREHEN METABOLIC PANEL: CPT | Performed by: INTERNAL MEDICINE

## 2018-11-10 PROCEDURE — 97161 PT EVAL LOW COMPLEX 20 MIN: CPT

## 2018-11-10 PROCEDURE — 94760 N-INVAS EAR/PLS OXIMETRY 1: CPT

## 2018-11-10 PROCEDURE — G8978 MOBILITY CURRENT STATUS: HCPCS

## 2018-11-10 PROCEDURE — G8979 MOBILITY GOAL STATUS: HCPCS

## 2018-11-10 PROCEDURE — 99232 SBSQ HOSP IP/OBS MODERATE 35: CPT | Performed by: INTERNAL MEDICINE

## 2018-11-10 PROCEDURE — 97166 OT EVAL MOD COMPLEX 45 MIN: CPT

## 2018-11-10 PROCEDURE — 85027 COMPLETE CBC AUTOMATED: CPT | Performed by: INTERNAL MEDICINE

## 2018-11-10 PROCEDURE — G8989 SELF CARE D/C STATUS: HCPCS

## 2018-11-10 PROCEDURE — G8988 SELF CARE GOAL STATUS: HCPCS

## 2018-11-10 PROCEDURE — G8987 SELF CARE CURRENT STATUS: HCPCS

## 2018-11-10 PROCEDURE — 85610 PROTHROMBIN TIME: CPT | Performed by: INTERNAL MEDICINE

## 2018-11-10 RX ORDER — POTASSIUM CHLORIDE 20 MEQ/1
40 TABLET, EXTENDED RELEASE ORAL ONCE
Status: COMPLETED | OUTPATIENT
Start: 2018-11-10 | End: 2018-11-10

## 2018-11-10 RX ADMIN — NIACIN 1500 MG: 500 TABLET, FILM COATED, EXTENDED RELEASE ORAL at 21:47

## 2018-11-10 RX ADMIN — PREDNISOLONE ACETATE 1 DROP: 10 SUSPENSION/ DROPS OPHTHALMIC at 08:27

## 2018-11-10 RX ADMIN — POTASSIUM CHLORIDE 40 MEQ: 1500 TABLET, EXTENDED RELEASE ORAL at 12:18

## 2018-11-10 RX ADMIN — CALCIUM 1 TABLET: 500 TABLET ORAL at 08:25

## 2018-11-10 RX ADMIN — TIMOLOL MALEATE 1 DROP: 5 SOLUTION OPHTHALMIC at 17:07

## 2018-11-10 RX ADMIN — ATROPINE SULFATE 1 DROP: 10 SOLUTION/ DROPS OPHTHALMIC at 08:25

## 2018-11-10 RX ADMIN — VITAMIN D, TAB 1000IU (100/BT) 1000 UNITS: 25 TAB at 08:25

## 2018-11-10 RX ADMIN — FUROSEMIDE 80 MG: 10 INJECTION, SOLUTION INTRAVENOUS at 15:57

## 2018-11-10 RX ADMIN — TIMOLOL MALEATE 1 DROP: 5 SOLUTION OPHTHALMIC at 08:27

## 2018-11-10 RX ADMIN — Medication 81 MG: at 08:24

## 2018-11-10 RX ADMIN — ATORVASTATIN CALCIUM 40 MG: 40 TABLET, FILM COATED ORAL at 08:25

## 2018-11-10 RX ADMIN — FUROSEMIDE 80 MG: 10 INJECTION, SOLUTION INTRAVENOUS at 08:26

## 2018-11-10 NOTE — OCCUPATIONAL THERAPY NOTE
Occupational Therapy Evaluation      Chago Modidyan    11/10/2018    Patient Active Problem List   Diagnosis    Malignant melanoma of left foot ([facility] [address] )    Blind right eye    Cerebrovascular accident ([facility] [address] )    Cholelithiasis without obstruction    Coronary arteriosclerosis in native artery    Essential hypertension    Hyperlipidemia    Hypertensive disorder    Idiopathic progressive polyneuropathy    Neovascular glaucoma of left eye    Osteoarthritis    Osteoporosis    Overweight    Vitamin D deficiency    Congestive heart failure (CHF) (HCC)    Coagulopathy (HCC)    Chronic atrial fibrillation ([facility] )    Supratherapeutic INR       Past Medical History:   Diagnosis Date    Bleeding risk due to Coumadin and aspirin     Cancer ([facility] [address] )     LT foot    Cataract     Coronary artery disease     Glaucoma     right eye    Hyperlipidemia     Hypertension        Past Surgical History:   Procedure Laterality Date    CARDIAC SURGERY      1974 and 08095'O    CORONARY STENT PLACEMENT  1990's    pt has two coronary stents    JOINT REPLACEMENT Left     THR    [place] KNEE SCOPE,MED/LAT MENISECTOMY Left 8/15/2018    Procedure: left KNEE ARTHROSCOPY with medial & lateral menisectomy, chondroplasty,synovectomy & injection;  Surgeon: Beauford Brittle, DO;  Location: [address] [place] [address] OR;  Service: Orthopedics      11/10/18 0900   Note Type   Note type Eval only   Restrictions/Precautions   Weight Bearing Precautions Per Order No   Pain Assessment   Pain Assessment No/denies pain   Pain Score No Pain   Home Living   Type of [address] Two level;Performs ADLs on one level; Able to live on main level with bedroom/bathroom  (1STE)   Bathroom Shower/Tub Tub/shower unit  (with doors)   [address] [place] [address] Dr horowitz  (has DME/AE from hip sx in the past, does not use)   Home Equipment Walker;Cane;Reacher  (AE, not using DME PTA)   Additional Comments ambulatory w/o devoces   Prior Function Level of [place] Independent with ADLs and functional mobility   Lives With Alone   Receives Help From Family   ADL Assistance Independent   IADLs Independent   Falls in the last 6 months 0   Vocational Retired   Psychosocial   Psychosocial (WDL) WDL   ADL   Eating Assistance 7  Independent   Grooming Assistance 7  Independent   UB Bathing Assistance 7  [place] 7  Independent   LB [place]  (seated for LB)   UB Dressing Assistance 7  Independent   LB Dressing Assistance 6  Modified independent   LB Dressing Deficit (seated)   Toileting Assistance  7  Independent   Bed Mobility   Rolling R 7  Independent   Rolling L 7  Independent   Supine to Sit 7  Independent   Sit to Supine 7  Independent   Transfers   Sit to Stand 7  Independent   Stand to Sit 7  Independent   Toilet transfer 7  Independent   Functional Mobility   Functional Mobility 7  Independent   Additional Comments (200' w/o AD, no lob)   Balance   Static Sitting Normal   Dynamic Sitting Normal   Static Standing Normal   Dynamic Standing Good   Ambulatory Good   Activity Tolerance   Activity Tolerance Patient tolerated treatment well  (functional mobility X 200' (w/o O@) pulse O2 dropped)   Nurse Made Aware yes, Anna RE Pulse O2, decreased mto 84 , recovered with O@ and breathing through nose VC, following functional mobility  (O@ sats seated prior to functional mobility at 96, )   RUE Assessment   RUE Assessment WFL   LUE Assessment   LUE Assessment WFL   Hand Function   Gross Motor Coordination Functional   Fine Motor Coordination Functional   Cognition   Overall Cognitive Status WFL   Arousal/Participation Alert; Cooperative   Attention Within functional limits   Orientation Level Oriented X4   Memory Within functional limits   Following Commands Follows all commands and directions without difficulty   Assessment   Limitation (no OT needs identified at this time, eval only) Prognosis Good   Assessment Pt is a 80 y o  male seen for OT evaluation s/p admit to [address] on 11/8/2018 w/ Congestive heart failure (CHF) ([address] )  Comorbidities affecting pt's functional performance at time of assessment include: HTN, CHF and Malignant Melanoma Lt Foot, Hx CVA, Polyneuropathy, OA, A Fib, Osteoporosis  Personal factors affecting pt at time of IE include:limited home support  Prior to admission, pt was I with self care ADL's, I ADL's, Driving and ambulation w/o AD  Pt has DME and AE available from prior surgery  Heladio Jackson Upon evaluation:, pt appears to be essentially at PLOF's, w/o any OT intervention indicated at this time  From OT standpoint, recommendation at time of d/c would be Home       Goals   Patient Goals go back home   Plan   Treatment Interventions (eval only)   Goal Expiration Date 11/10/18   Treatment Day 0   OT Frequency Eval only   Recommendation   OT Discharge Recommendation Other (Comment)  (Home)   OT - OK to Discharge Yes   Barthel Index   Feeding 10   Bathing 5   Grooming Score 5   Dressing Score 10   Bladder Score 10   Bowels Score 10   Toilet Use Score 10   Transfers (Bed/Chair) Score 15   Mobility (Level Surface) Score 15   Stairs Score 0  (DNT)   Barthel Index Score 90   Mary Lamar OT

## 2018-11-10 NOTE — ASSESSMENT & PLAN NOTE
· Rates controlled  · Patient on Coumadin for anticoagulation  · Coumadin currently on hold due to supratherapeutic INR  · Trend INR

## 2018-11-10 NOTE — PROGRESS NOTES
Progress Note - Ronan Carter 3/5/1931, 80 y o  male MRN: [id number]    Unit/Bed#: -01 Encounter: [id number]    Primary Care Provider: Cathie David MD   Date and time admitted to hospital: 2018 10:04 PM        Hypokalemia   Assessment & Plan    · Replete potassium     Supratherapeutic INR   Assessment & Plan    · INR continues to be elevated  · Continue to hold Coumadin  · Trend INR     Chronic atrial fibrillation (HCC)   Assessment & Plan    · Rates controlled  · Patient on Coumadin for anticoagulation  · Coumadin currently on hold due to supratherapeutic INR  · Trend INR     Neovascular glaucoma of left eye   Assessment & Plan    · Continue pre-hospital eyedrops     * Congestive heart failure (CHF) ([address] )   Assessment & Plan    · Appreciate Cardiology input  · Continue diuresis with IV Lasix  · Patient continues to improve  · Titrate oxygen         VTE Pharmacologic Prophylaxis: Pharmacologic: Warfarin (Coumadin)    Patient Centered Rounds: I have performed bedside rounds with nursing staff today  Discussions with Specialists or Other Care Team Provider:   Cardiology  Education and Discussions with Family / Patient:   Patient    Time Spent for Care: 20 minutes  More than 50% of total time spent on counseling and coordination of care as described above  Current Length of Stay: 2 day(s)    Current Patient Status: Inpatient   Certification Statement: The patient will continue to require additional inpatient hospital stay due to CHF exacerbation    Discharge Plan:   Likely home tomorrow if patient remains euvolemic    Code Status: Level 1 - Full Code    Subjective:   Patient seen examined  No acute events were noted    Shortness of breath improving    Objective:     Vitals:   Temp (24hrs), Av 2 °F (37 3 °C), Min:98 1 °F (36 7 °C), Max:99 8 °F (37 7 °C)    Temp:  [98 1 °F (36 7 °C)-99 8 °F (37 7 °C)] 98 1 °F (36 7 °C)  HR:  [53-92] 66  Resp:  [18] 18  BP: (103-153)/(53-65) 103/53  SpO2:  [93 %-99 %] 99 %  Body mass index is 29 69 kg/m²  Input and Output Summary (last 24 hours): Intake/Output Summary (Last 24 hours) at 11/10/18 1105  Last data filed at 11/09/18 2001   Gross per 24 hour   Intake              240 ml   Output             2050 ml   Net            -1810 ml       Physical Exam:     Physical Exam   Constitutional: He is oriented to person, place, and time  He appears well-developed and well-nourished  HENT:   Head: Normocephalic and atraumatic  Eyes: Pupils are equal, round, and reactive to light  EOM are normal    Neck: Normal range of motion  Neck supple  Cardiovascular: Normal rate  Irregularly irregular   Pulmonary/Chest: Effort normal    Decreased breath sounds at the bases bilaterally   Abdominal: Soft  He exhibits no distension  There is no tenderness  Obese   Musculoskeletal: Normal range of motion  He exhibits edema  Neurological: He is alert and oriented to person, place, and time  Skin: Skin is warm  No rash noted  No erythema  Psychiatric: He has a normal mood and affect  His behavior is normal  Thought content normal    Nursing note and vitals reviewed  Additional Data:     Labs:      Results from last 7 days  Lab Units 11/10/18  0449 11/09/18  0443   WBC Thousand/uL 8 70 7 10   HEMOGLOBIN g/dL 12 1* 11 7*   HEMATOCRIT % 37 5 36 7   PLATELETS Thousands/uL 209 194   NEUTROS PCT %  --  74   LYMPHS PCT %  --  15*   MONOS PCT %  --  9   EOS PCT %  --  2       Results from last 7 days  Lab Units 11/10/18  0449   POTASSIUM mmol/L 3 4*   CHLORIDE mmol/L 98   CO2 mmol/L 33*   BUN mg/dL 23   CREATININE mg/dL 1 15   CALCIUM mg/dL 9 2   ALK PHOS U/L 87   ALT U/L 52   AST U/L 39       Results from last 7 days  Lab Units 11/10/18  0449   INR  4 16*               * I Have Reviewed All Lab Data Listed Above  * Additional Pertinent Lab Tests Reviewed:  Jenn[facility] Admission  Reviewed    Imaging:  Imaging Reports Reviewed Today Include: n/a    Recent Cultures (last 7 days):       Results from last 7 days  Lab Units 11/08/18 2237 11/08/18 2233   BLOOD CULTURE   --  No Growth at 24 hrs  No Growth at 24 hrs  INFLUENZA B PCR  None Detected  --    RSV PCR  None Detected  --        Last 24 Hours Medication List:     Current Facility-Administered Medications:  acetaminophen 650 mg Oral Q6H PRN ELLIOTT Putnam-KRISS   amLODIPine 10 mg Oral Daily Eleazar Heller, PA-C   aspirin 81 mg Oral Daily Eleazar Heller, PA-C   atorvastatin 40 mg Oral Daily Eleazar Heller, PA-C   atropine 1 drop Right Eye Daily Eleazar Heller PA-C   calcium carbonate 1 tablet Oral Daily With Breakfast Eleazar Heller PA-C   cholecalciferol 1,000 Units Oral Daily Eleazar Heller PA-C   furosemide 80 mg Intravenous BID (diuretic) Hilda Parker MD   losartan 50 mg Oral Daily Eleazar Heller PA-C   niacin 1,500 mg Oral HS Eleazar Heller PA-C   ondansetron 4 mg Intravenous Q6H PRN Eleazar Heller PA-C   potassium chloride 40 mEq Oral Once Carla Garner MD   prednisoLONE acetate 1 drop Right Eye Daily Eleazar Heller PA-C   timolol 1 drop Left Eye BID Eleazar Heller PA-C   traMADol 50 mg Oral Q6H PRN Eleazar Heller PA-C        Today, Patient Was Seen By: Carla Garner MD    ** Please Note: Dictation voice to text software may have been used in the creation of this document   **

## 2018-11-10 NOTE — PHYSICAL THERAPY NOTE
Physical Therapy Evaluation     Patient's Name: Danita Baxter    Admitting Diagnosis  SOB (shortness of breath) [R06 02]  Supratherapeutic INR [R79 1]  Dyspnea, unspecified type [R06 00]  Congestive heart failure, unspecified HF chronicity, unspecified heart failure type ([address] ) [I50 9]    Problem List  Patient Active Problem List   Diagnosis    Malignant melanoma of left foot ([address] )    Blind right eye    Cerebrovascular accident ([address] )    Cholelithiasis without obstruction    Coronary arteriosclerosis in native artery    Essential hypertension    Hyperlipidemia    Hypertensive disorder    Idiopathic progressive polyneuropathy    Neovascular glaucoma of left eye    Osteoarthritis    Osteoporosis    Overweight    Vitamin D deficiency    Congestive heart failure (CHF) (HCC)    Coagulopathy (HCC)    Chronic atrial fibrillation ([address] )    Supratherapeutic INR       Past Medical History  Past Medical History:   Diagnosis Date    Bleeding risk due to Coumadin and aspirin     Cancer (HCC)     LT foot    Cataract     Coronary artery disease     Glaucoma     right eye    Hyperlipidemia     Hypertension        Past Surgical History  Past Surgical History:   Procedure Laterality Date    CARDIAC SURGERY      1974 and 89395'B    CORONARY STENT PLACEMENT  1990's    pt has two coronary stents    JOINT REPLACEMENT Left     THR    MS KNEE SCOPE,MED/LAT MENISECTOMY Left 8/15/2018    Procedure: left KNEE ARTHROSCOPY with medial & lateral menisectomy, chondroplasty,synovectomy & injection;  Surgeon: Bernabe Le DO;  Location: [address] [place] [address] OR;  Service: Orthopedics        11/10/18 0922   Note Type   Note type Eval only   Pain Assessment   Pain Assessment No/denies pain   Pain Score No Pain   Home Living   Type of Home House   Home Layout Two level;Performs ADLs on one level; Able to live on main level with bedroom/bathroom   Bathroom Shower/Tub Tub/shower unit   Bathroom Toilet Standard   Bathroom Equipment Shower chair   Home Equipment Walker;Cane;Reacher  (doens't use, but has from previous operations)   Additional Comments (carries cane in care "just in case")   Prior Function   Level of [place] Independent with ADLs and functional mobility   Lives With Alone   Receives Help From Family   ADL Assistance Independent   IADLs Independent   Falls in the last 6 months 0   Vocational Retired   Restrictions/Precautions   Wells Gregor Bearing Precautions Per Order No   Cognition   Overall Cognitive Status WFL   Arousal/Participation Alert   Attention Within functional limits   Orientation Level Oriented X4   Memory Within functional limits   Following Commands Follows all commands and directions without difficulty   RUE Assessment   RUE Assessment WFL   LUE Assessment   LUE Assessment WFL   RLE Assessment   RLE Assessment WFL   LLE Assessment   LLE Assessment WFL   Coordination   Movements are Fluid and Coordinated 1   Bed Mobility   Supine to Sit 7  Independent   Sit to Supine 7  Independent   Transfers   Sit to Stand 7  Independent   Stand to Sit 7  Independent   Ambulation/Elevation   Gait pattern WNL   Gait Assistance 7  Independent   Distance 200ft   Balance   Static Sitting Normal   Dynamic Sitting Normal   Static Standing Normal   Dynamic Standing Good   Ambulatory Good   Endurance Deficit   Endurance Deficit No   Activity Tolerance   Activity Tolerance Patient tolerated treatment well  (did not report feeling tired or OOB, but O2 dropped 96-84)   Nurse Made Aware yes, Anna RE Pulse O2, decreased mto 84 , recovered with O@ and breathing through nose VC, following functional mobility (O@ sats seated prior to functional mobility at 96, )  (yes, Anna RE Pulse O2, decreased mto 84 , recovered with O@ )   Assessment   Prognosis Excellent   Problem List Impaired vision; Impaired hearing   Assessment Pt is a 79y/o male seen for PT evaluation s/p admit to [address] on 11/10/18 w/ Congestive heart failure   PT consulted to assess pt's functional mobility and d/c needs  Order placed for PT eval and tx, w/ up as tolerated order  Patient was independent w/ all functional mobility w/ o AD  The following objective measures performed on IE also reveal: Barthel Index: 90/100  Pt's clinical presentation is currently stable  seen in pt's presentation of PLOF with transfers, ambulation, and bed mobility  From PT/mobility standpoint, recommendation at time of d/c would be anticipate no needs     Goals   Patient Goals to go home   Treatment Day 0   Recommendation   Recommendation Other (Comment)  (Cardiopulmonary Rehab)   PT - OK to Discharge Yes   Barthel Index   Feeding 10   Bathing 5   Grooming Score 5   Dressing Score 10   Bladder Score 10   Bowels Score 10   Toilet Use Score 10   Transfers (Bed/Chair) Score 15   Mobility (Level Surface) Score 15   Stairs Score 0   Barthel Index Score 90     Jason Moody,SPT

## 2018-11-10 NOTE — ASSESSMENT & PLAN NOTE
· Appreciate Cardiology input  · Continue diuresis with IV Lasix  · Patient continues to improve  · Titrate oxygen

## 2018-11-10 NOTE — PLAN OF CARE
Problem: Potential for Falls  Goal: Patient will remain free of falls  INTERVENTIONS:  - Assess patient frequently for physical needs  -  Identify cognitive and physical deficits and behaviors that affect risk of falls    -  [place] fall precautions as indicated by assessment   - Educate patient/family on patient safety including physical limitations  - Instruct patient to call for assistance with activity based on assessment  - Modify environment to reduce risk of injury  - Consider OT/PT consult to assist with strengthening/mobility   Outcome: Progressing      Problem: PAIN - ADULT  Goal: Verbalizes/displays adequate comfort level or baseline comfort level  Interventions:  - Encourage patient to monitor pain and request assistance  - Assess pain using appropriate pain scale  - Administer analgesics based on type and severity of pain and evaluate response  - Implement non-pharmacological measures as appropriate and evaluate response  - Consider cultural and social influences on pain and pain management  - Notify physician/advanced practitioner if interventions unsuccessful or patient reports new pain   Outcome: Progressing      Problem: INFECTION - ADULT  Goal: Absence of fever/infection during neutropenic period  INTERVENTIONS:  - Monitor WBC  - Implement neutropenic guidelines   Outcome: Progressing      Problem: SAFETY ADULT  Goal: Maintain or return to baseline ADL function  INTERVENTIONS:  -  Assess patient's ability to carry out ADLs; assess patient's baseline for ADL function and identify physical deficits which impact ability to perform ADLs (bathing, care of mouth/teeth, toileting, grooming, dressing, etc )  - Assess/evaluate cause of self-care deficits   - Assess range of motion  - Assess patient's mobility; develop plan if impaired  - Assess patient's need for assistive devices and provide as appropriate  - Encourage maximum independence but intervene and supervise when necessary  ¯ Involve family in performance of ADLs  ¯ Assess for home care needs following discharge   ¯ Request OT consult to assist with ADL evaluation and planning for discharge  ¯ Provide patient education as appropriate   Outcome: Progressing    Goal: Maintain or return mobility status to optimal level  INTERVENTIONS:  - Assess patient's baseline mobility status (ambulation, transfers, stairs, etc )    - Identify cognitive and physical deficits and behaviors that affect mobility  - Identify mobility aids required to assist with transfers and/or ambulation (gait belt, sit-to-stand, lift, walker, cane, etc )  - [place] fall precautions as indicated by assessment  - Record patient progress and toleration of activity level on Mobility SBAR; progress patient to next Phase/Stage  - Instruct patient to call for assistance with activity based on assessment  - Request Rehabilitation consult to assist with strengthening/weightbearing, etc    Outcome: Progressing      Problem: DISCHARGE PLANNING  Goal: Discharge to home or other facility with appropriate resources  INTERVENTIONS:  - Identify barriers to discharge w/patient and caregiver  - Arrange for needed discharge resources and transportation as appropriate  - Identify discharge learning needs (meds, wound care, etc )  - Arrange for interpretive services to assist at discharge as needed  - Refer to Case Management Department for coordinating discharge planning if the patient needs post-hospital services based on physician/advanced practitioner order or complex needs related to functional status, cognitive ability, or social support system   Outcome: Progressing      Problem: Knowledge Deficit  Goal: Patient/family/caregiver demonstrates understanding of disease process, treatment plan, medications, and discharge instructions  Complete learning assessment and assess knowledge base    Interventions:  - Provide teaching at level of understanding  - Provide teaching via preferred learning methods Outcome: Progressing      Problem: Nutrition/Hydration-ADULT  Goal: Nutrient/Hydration intake appropriate for improving, restoring or maintaining nutritional needs  Monitor and assess patient's nutrition/hydration status for malnutrition (ex- brittle hair, bruises, dry skin, pale skin and conjunctiva, muscle wasting, smooth red tongue, and disorientation)  Collaborate with interdisciplinary team and initiate plan and interventions as ordered  Monitor patient's weight and dietary intake as ordered or per policy  Utilize nutrition screening tool and intervene per policy  Determine patient's food preferences and provide high-protein, high-caloric foods as appropriate       INTERVENTIONS:  - Monitor oral intake, urinary output, labs, and treatment plans  - Assess nutrition and hydration status and recommend course of action  - Evaluate amount of meals eaten  - Assist patient with eating if necessary   - Allow adequate time for meals  - Recommend/ encourage appropriate diets, oral nutritional supplements, and vitamin/mineral supplements  - Order, calculate, and assess calorie counts as needed  - Recommend, monitor, and adjust tube feedings and TPN/PPN based on assessed needs  - Assess need for intravenous fluids  - Provide specific nutrition/hydration education as appropriate  - Include patient/family/caregiver in decisions related to nutrition   Outcome: Progressing      Problem: DISCHARGE PLANNING - CARE MANAGEMENT  Goal: Discharge to post-acute care or home with appropriate resources  INTERVENTIONS:  - Conduct assessment to determine patient/family and health care team treatment goals, and need for post-acute services based on payer coverage, community resources, and patient preferences, and barriers to discharge  - Address psychosocial, clinical, and financial barriers to discharge as identified in assessment in conjunction with the patient/family and health care team  - Arrange appropriate level of post-acute services according to patient's   needs and preference and payer coverage in collaboration with the physician and health care team  - Communicate with and update the patient/family, physician, and health care team regarding progress on the discharge plan  - Arrange appropriate transportation to post-acute venues    D/c home   Outcome: Progressing

## 2018-11-11 VITALS
HEART RATE: 75 BPM | DIASTOLIC BLOOD PRESSURE: 74 MMHG | OXYGEN SATURATION: 93 % | TEMPERATURE: 97.7 F | HEIGHT: 67 IN | RESPIRATION RATE: 16 BRPM | BODY MASS INDEX: 29.65 KG/M2 | WEIGHT: 188.9 LBS | SYSTOLIC BLOOD PRESSURE: 119 MMHG

## 2018-11-11 PROBLEM — E87.6 HYPOKALEMIA: Status: RESOLVED | Noted: 2018-11-10 | Resolved: 2018-11-11

## 2018-11-11 LAB
ALBUMIN SERPL BCP-MCNC: 3.9 G/DL (ref 3.5–5.7)
ALP SERPL-CCNC: 88 U/L (ref 55–165)
ALT SERPL W P-5'-P-CCNC: 46 U/L (ref 7–52)
ANION GAP SERPL CALCULATED.3IONS-SCNC: 9 MMOL/L (ref 4–13)
AST SERPL W P-5'-P-CCNC: 33 U/L (ref 13–39)
BILIRUB SERPL-MCNC: 0.9 MG/DL (ref 0.2–1)
BUN SERPL-MCNC: 28 MG/DL (ref 7–25)
CALCIUM SERPL-MCNC: 9 MG/DL (ref 8.6–10.5)
CHLORIDE SERPL-SCNC: 97 MMOL/L (ref 98–107)
CO2 SERPL-SCNC: 32 MMOL/L (ref 21–31)
CREAT SERPL-MCNC: 1.19 MG/DL (ref 0.7–1.3)
ERYTHROCYTE [DISTWIDTH] IN BLOOD BY AUTOMATED COUNT: 14.8 % (ref 11.5–14.5)
GFR SERPL CREATININE-BSD FRML MDRD: 55 ML/MIN/1.73SQ M
GLUCOSE SERPL-MCNC: 86 MG/DL (ref 65–99)
HCT VFR BLD AUTO: 37.8 % (ref 36.5–49.3)
HGB BLD-MCNC: 12.7 G/DL (ref 14–18)
INR PPP: 2.54 (ref 0.9–1.5)
MCH RBC QN AUTO: 30 PG (ref 26–34)
MCHC RBC AUTO-ENTMCNC: 33.5 G/DL (ref 31–37)
MCV RBC AUTO: 90 FL (ref 81–99)
PLATELET # BLD AUTO: 221 THOUSANDS/UL (ref 149–390)
PMV BLD AUTO: 7.9 FL (ref 8.6–11.7)
POTASSIUM SERPL-SCNC: 3.7 MMOL/L (ref 3.5–5.5)
PROT SERPL-MCNC: 6.6 G/DL (ref 6.4–8.9)
PROTHROMBIN TIME: 29.7 SECONDS (ref 10.2–13)
RBC # BLD AUTO: 4.22 MILLION/UL (ref 4.3–5.9)
SODIUM SERPL-SCNC: 138 MMOL/L (ref 134–143)
WBC # BLD AUTO: 8.2 THOUSAND/UL (ref 4.8–10.8)

## 2018-11-11 PROCEDURE — 94760 N-INVAS EAR/PLS OXIMETRY 1: CPT

## 2018-11-11 PROCEDURE — 80053 COMPREHEN METABOLIC PANEL: CPT | Performed by: INTERNAL MEDICINE

## 2018-11-11 PROCEDURE — 85027 COMPLETE CBC AUTOMATED: CPT | Performed by: INTERNAL MEDICINE

## 2018-11-11 PROCEDURE — 85610 PROTHROMBIN TIME: CPT | Performed by: INTERNAL MEDICINE

## 2018-11-11 PROCEDURE — 99239 HOSP IP/OBS DSCHRG MGMT >30: CPT | Performed by: INTERNAL MEDICINE

## 2018-11-11 PROCEDURE — 99233 SBSQ HOSP IP/OBS HIGH 50: CPT | Performed by: INTERNAL MEDICINE

## 2018-11-11 RX ORDER — FUROSEMIDE 40 MG/1
40 TABLET ORAL DAILY
Qty: 30 TABLET | Refills: 0 | Status: ON HOLD | OUTPATIENT
Start: 2018-11-11 | End: 2019-01-23 | Stop reason: CLARIF

## 2018-11-11 RX ORDER — WARFARIN SODIUM 3 MG/1
3 TABLET ORAL
Status: DISCONTINUED | OUTPATIENT
Start: 2018-11-11 | End: 2018-11-11 | Stop reason: HOSPADM

## 2018-11-11 RX ORDER — FUROSEMIDE 40 MG/1
40 TABLET ORAL DAILY
Status: DISCONTINUED | OUTPATIENT
Start: 2018-11-11 | End: 2018-11-11 | Stop reason: HOSPADM

## 2018-11-11 RX ORDER — POTASSIUM CHLORIDE 1.5 G/1.77G
20 POWDER, FOR SOLUTION ORAL DAILY
Qty: 30 TABLET | Refills: 0 | Status: ON HOLD | OUTPATIENT
Start: 2018-11-11 | End: 2019-01-23 | Stop reason: CLARIF

## 2018-11-11 RX ADMIN — ATORVASTATIN CALCIUM 40 MG: 40 TABLET, FILM COATED ORAL at 08:41

## 2018-11-11 RX ADMIN — Medication 81 MG: at 08:41

## 2018-11-11 RX ADMIN — PREDNISOLONE ACETATE 1 DROP: 10 SUSPENSION/ DROPS OPHTHALMIC at 08:42

## 2018-11-11 RX ADMIN — CALCIUM 1 TABLET: 500 TABLET ORAL at 08:41

## 2018-11-11 RX ADMIN — AMLODIPINE BESYLATE 10 MG: 5 TABLET ORAL at 08:40

## 2018-11-11 RX ADMIN — ATROPINE SULFATE 1 DROP: 10 SOLUTION/ DROPS OPHTHALMIC at 08:41

## 2018-11-11 RX ADMIN — VITAMIN D, TAB 1000IU (100/BT) 1000 UNITS: 25 TAB at 08:41

## 2018-11-11 RX ADMIN — FUROSEMIDE 40 MG: 40 TABLET ORAL at 11:49

## 2018-11-11 RX ADMIN — LOSARTAN POTASSIUM 50 MG: 50 TABLET, FILM COATED ORAL at 08:42

## 2018-11-11 RX ADMIN — TIMOLOL MALEATE 1 DROP: 5 SOLUTION OPHTHALMIC at 08:42

## 2018-11-11 RX ADMIN — FUROSEMIDE 80 MG: 10 INJECTION, SOLUTION INTRAVENOUS at 08:41

## 2018-11-11 NOTE — ASSESSMENT & PLAN NOTE
· Appreciate Cardiology input   · Acute on chronic diastolic CHF  · Appears euvolemic  · Plan to discharge home on 40 p o  q day of Lasix

## 2018-11-11 NOTE — DISCHARGE SUMMARY
Discharge- Brenda Patiño 3/5/1931, 80 y o  male MRN: [id number]    Unit/Bed#: -01 Encounter: [id number]    Primary Care Provider: Carlos Julian MD   Date and time admitted to hospital: 11/8/2018 10:04 PM        Chronic atrial fibrillation ([facility] )   Assessment & Plan    · Rates controlled  · Patient on Coumadin for anticoagulation  · Follow-up with primary cardiologist in outpatient     * Congestive heart failure (CHF) ([facility] )   Assessment & Plan    · [address] Cardiology input   · Acute on chronic diastolic CHF  · Appears euvolemic  · Plan to discharge home on 40 p o  q day of Lasix     Hypokalemiaresolved as of 11/11/2018   Assessment & Plan    · Resolved     Supratherapeutic INRresolved as of 11/11/2018   Assessment & Plan    · INR therapeutic  · Resume Coumadin  · Trend INR, followed by his cardiologist as an outpatient         Discharging Physician / Practitioner: Aby Duffy MD  PCP: Carlos Julian MD  Admission Date:   Admission Orders     Ordered        11/08/18 2333  Inpatient Admission (expected length of stay for this patient is greater than two midnights)  Once             Discharge Date: 11/11/18    Resolved Problems  Date Reviewed: 11/11/2018          Resolved    Supratherapeutic INR 11/11/2018     Resolved by  Aby Duffy MD    Hypokalemia 11/11/2018     Resolved by  Aby Duffy MD          Consultations During Hospital Stay:  · cardiology    Procedures Performed:   · none    Significant Findings / Test Results:   · Echo - diastolic dysfunction    Incidental Findings:   · n/a     Test Results Pending at Discharge (will require follow up):   · none     Outpatient Tests Requested:  · CBC, CMP and INR    Complications:  None    Reason for Admission: shortness of breath    Hospital Course:     Brenda Patiño is a 80 y o  male patient who originally presented to the hospital on 11/8/2018 due to shortness of breath    Patient appeared volume overloaded and was admitted for acute on chronic diastolic congestive heart failure exacerbation  Patient had an echocardiogram which demonstrated preserved EF, however diastolic dysfunction  Patient was diuresed with IV Lasix with subsequent resolution of his symptoms  He a he was seen in consultation by Cardiology recommended that he follow up with his primary care physician and his primary cardiologist Dr Harshal Holcomb in [place]  Incidentally, patient was noted to have supratherapeutic INR  It trended down to therapeutic range on the day of discharge  Patient was instructed to have repeat INR drawn with results to be followed with his primary cardiologist   Patient will be discharged on 40 mg of p o  Lasix q day along with potassium supplementation and has been instructed to have repeat CMP drawn within 1 week of discharge  Patient voiced understanding of instructions  Please see above list of diagnoses and related plan for additional information  Condition at Discharge: fair     Discharge Day Visit / Exam:     Subjective:  Patient seen examined  No acute events overnight  Patient has returned to his baseline  Wishes to go home  Vitals: Blood Pressure: 119/74 (11/11/18 0835)  Pulse: 75 (11/11/18 0811)  Temperature: 97 7 °F (36 5 °C) (11/11/18 0811)  Temp Source: Tympanic (11/11/18 0811)  Respirations: 16 (11/11/18 0811)  Height: 5' 7" (170 2 cm) (11/09/18 0047)  Weight - Scale: 85 7 kg (188 lb 14 4 oz) (11/11/18 0545)  SpO2: 93 % (11/11/18 0811)  Exam:   Physical Exam   Constitutional: He is oriented to person, place, and time  He appears well-developed and well-nourished  HENT:   Head: Normocephalic and atraumatic  Eyes:   Right eye blind   Neck: Normal range of motion  Neck supple  No JVD present  Cardiovascular: Normal rate and normal heart sounds  Irregularly irregular   Pulmonary/Chest: Effort normal and breath sounds normal    Abdominal: Soft   Bowel sounds are normal    Obese   Musculoskeletal: Normal range of motion  He exhibits no edema  Neurological: He is alert and oriented to person, place, and time  Skin: Skin is warm  No rash noted  No erythema  Psychiatric: He has a normal mood and affect  His behavior is normal    Nursing note and vitals reviewed  Discussion with Family: n/a    Discharge instructions/Information to patient and family:   See after visit summary for information provided to patient and family  Provisions for Follow-Up Care:  See after visit summary for information related to follow-up care and any pertinent home health orders  Disposition:     Home    For Discharges to [facility]:   · Not Applicable to this Patient - Not Applicable to this Patient    Planned Readmission: none     Discharge Statement:  I spent 34 minutes discharging the patient  This time was spent on the day of discharge  I had direct contact with the patient on the day of discharge  Greater than 50% of the total time was spent examining patient, answering all patient questions, arranging and discussing plan of care with patient as well as directly providing post-discharge instructions  Additional time then spent on discharge activities  Discharge Medications:  See after visit summary for reconciled discharge medications provided to patient and family        ** Please Note: This note has been constructed using a voice recognition system **

## 2018-11-11 NOTE — ASSESSMENT & PLAN NOTE
· Rates controlled  · Patient on Coumadin for anticoagulation  · Follow-up with primary cardiologist in outpatient

## 2018-11-11 NOTE — PROGRESS NOTES
Cardiology Progress Note - Camilla Sellers 80 y o  male MRN: [id number]    Unit/Bed#: -01 Encounter: [id number]      Assessment/Plan:  1  Acute diastolic heart failure   · Clinically improved with IV diuresis  He is diuretic naive  DC home today on lasix 40mg PO/day  · RV dilated with normal function on echo  No clinical evidence of PE, especially given supratherapeutic INR  - will need outpatient pulmonary follow up with PFTs and polysomnogram       2  Atrial fibrillation   · Rate controlled, off AV yessenia blockers   · INR therapeutic- restart coumadin at 3mg/day- follow up with outpatient cardiologist in [place]     4  CAD, s/p remote CABG and PCI  · Continue ASA, statin  · Rate controlled off BB     Subjective:   Patient seen and examined  No significant events overnight  No chest pain or shortness of breath  Telemetry: rate controlled AF      Objective:     Vitals: Blood pressure 119/74, pulse 75, temperature 97 7 °F (36 5 °C), temperature source Tympanic, resp  rate 16, height 5' 7" (1 702 m), weight 85 7 kg (188 lb 14 4 oz), SpO2 93 %  , Body mass index is 29 59 kg/m² , Orthostatic Blood Pressures      Most Recent Value   Blood Pressure  119/74 filed at 11/11/2018 0835   Patient Position - Orthostatic VS  Lying filed at 11/11/2018 0806            Intake/Output Summary (Last 24 hours) at 11/11/18 1029  Last data filed at 11/11/18 0314   Gross per 24 hour   Intake              650 ml   Output             2450 ml   Net            -1800 ml             Physical Exam:    GEN: Camilla Sellers appears well, alert and oriented x 3, pleasant and cooperative   HEENT: pupils equal, round, and reactive to light; extraocular muscles intact  NECK: supple, no carotid bruits   HEART: regular rhythm, normal S1 and S2, no murmurs, clicks, gallops or rubs   LUNGS: clear to auscultation bilaterally; no wheezes, rales, or rhonchi   ABDOMEN: normal bowel sounds, soft, no tenderness, no distention  EXTREMITIES: peripheral pulses normal; no clubbing, cyanosis, or edema      Medications:      Current Facility-Administered Medications:     acetaminophen (TYLENOL) tablet 650 mg, 650 mg, Oral, Q6H PRN, Daniela Ferrara PA-C    amLODIPine (NORVASC) tablet 10 mg, 10 mg, Oral, Daily, Lataa Part, PA-C, 10 mg at 11/11/18 0840    aspirin chewable tablet 81 mg, 81 mg, Oral, Daily, Daniela Part PA-C, 81 mg at 11/11/18 0841    atorvastatin (LIPITOR) tablet 40 mg, 40 mg, Oral, Daily, Alphtundea Part, PA-C, 40 mg at 11/11/18 0841    atropine (ISOPTO ATROPINE) 1 % ophthalmic solution 1 drop, 1 drop, Right Eye, Daily, Daniela Ferrara PA-C, 1 drop at 11/11/18 0841    calcium carbonate (OYSTER SHELL,OSCAL) 500 mg tablet 1 tablet, 1 tablet, Oral, Daily With Breakfast, ELLIOTT Jimenez-C, 1 tablet at 11/11/18 0841    cholecalciferol (VITAMIN D3) tablet 1,000 Units, 1,000 Units, Oral, Daily, Daniela Ferrara PA-C, 1,000 Units at 11/11/18 0841    furosemide (LASIX) injection 80 mg, 80 mg, Intravenous, BID (diuretic), Missy Kendall MD, 80 mg at 11/11/18 0841    losartan (COZAAR) tablet 50 mg, 50 mg, Oral, Daily, Daniela Ferrara PA-C, 50 mg at 11/11/18 0842    niacin (NIASPAN) CR tablet 1,500 mg, 1,500 mg, Oral, HS, Daniela Part PA-C, 1,500 mg at 11/10/18 2147    ondansetron (ZOFRAN) injection 4 mg, 4 mg, Intravenous, Q6H PRN, Daniela Ferrara PA-C    prednisoLONE acetate (PRED FORTE) 1 % ophthalmic suspension 1 drop, 1 drop, Right Eye, Daily, Daniela Ferrara PA-C, 1 drop at 11/11/18 0842    timolol (TIMOPTIC) 0 5 % ophthalmic solution 1 drop, 1 drop, Left Eye, BID, Daniela Ferrara PA-C, 1 drop at 11/11/18 0842    traMADol (ULTRAM) tablet 50 mg, 50 mg, Oral, Q6H PRN, Daniela Ferrara PA-C     Labs & Results:      Results from last 7 days  Lab Units 11/09/18  0444 11/09/18  0121 11/08/18  2233   CK TOTAL U/L  --   --  180   TROPONIN I ng/mL <0 03 <0 03 <0 03   CK MB INDEX %  --   --  3 2       Results from last 7 days  Lab Units 11/11/18  0523 11/10/18  0449 11/09/18  0443   WBC Thousand/uL 8 20 8 70 7 10   HEMOGLOBIN g/dL 12 7* 12 1* 11 7*   HEMATOCRIT % 37 8 37 5 36 7   PLATELETS Thousands/uL 221 209 194       Results from last 7 days  Lab Units 11/09/18  0443   TRIGLYCERIDES mg/dL 74   HDL mg/dL 40       Results from last 7 days  Lab Units 11/11/18  0523 11/10/18  0449 11/09/18  0443   POTASSIUM mmol/L 3 7 3 4* 3 6   CHLORIDE mmol/L 97* 98 104   CO2 mmol/L 32* 33* 26   BUN mg/dL 28* 23 35*   CREATININE mg/dL 1 19 1 15 1 15   CALCIUM mg/dL 9 0 9 2 9 1   ALK PHOS U/L 88 87 95   ALT U/L 46 52 53*   AST U/L 33 39 39       Results from last 7 days  Lab Units 11/11/18  0523 11/10/18  0449 11/09/18 0444 11/08/18  2233   INR  2 54* 4 16* 5 73* 5 09*   PTT seconds  --   --   --  55*       Results from last 7 days  Lab Units 11/08/18  2233   MAGNESIUM mg/dL 2 6             Counseling / Coordination of Care  Total floor / unit time spent today 30 minutes  Greater than 50% of total time was spent with the patient and / or family counseling and / or coordination of care

## 2018-11-12 NOTE — SOCIAL WORK
I called [facility] office and the pt has an appointment set up for 11/13/18 @10am , outpt cm referral was c made on 11/91/8 @ 18:20

## 2018-11-14 LAB
BACTERIA BLD CULT: NORMAL
BACTERIA BLD CULT: NORMAL

## 2018-11-20 ENCOUNTER — ANESTHESIA EVENT (OUTPATIENT)
Dept: PERIOP | Facility: HOSPITAL | Age: 83
End: 2018-11-20
Payer: MEDICARE

## 2018-11-20 RX ORDER — SODIUM CHLORIDE 9 MG/ML
125 INJECTION, SOLUTION INTRAVENOUS CONTINUOUS
Status: CANCELLED | OUTPATIENT
Start: 2018-12-03

## 2018-11-21 ENCOUNTER — APPOINTMENT (OUTPATIENT)
Dept: LAB | Facility: HOSPITAL | Age: 83
End: 2018-11-21
Attending: SURGERY
Payer: MEDICARE

## 2018-11-21 ENCOUNTER — HOSPITAL ENCOUNTER (OUTPATIENT)
Dept: RADIOLOGY | Facility: HOSPITAL | Age: 83
Discharge: HOME | End: 2018-11-21
Attending: SURGERY
Payer: MEDICARE

## 2018-11-21 ENCOUNTER — HOSPITAL ENCOUNTER (OUTPATIENT)
Dept: NON INVASIVE DIAGNOSTICS | Facility: HOSPITAL | Age: 83
Discharge: HOME | End: 2018-11-21
Attending: SURGERY
Payer: MEDICARE

## 2018-11-21 ENCOUNTER — APPOINTMENT (OUTPATIENT)
Dept: PREADMISSION TESTING | Facility: HOSPITAL | Age: 83
End: 2018-11-21
Payer: MEDICARE

## 2018-11-21 DIAGNOSIS — C43.72 MALIGNANT MELANOMA OF LEFT FOOT (HCC): ICD-10-CM

## 2018-11-21 LAB
ANION GAP SERPL CALCULATED.3IONS-SCNC: 9 MMOL/L (ref 4–13)
APTT PPP: 43 SECONDS (ref 26–38)
BUN SERPL-MCNC: 19 MG/DL (ref 5–25)
CALCIUM SERPL-MCNC: 9 MG/DL (ref 8.3–10.1)
CHLORIDE SERPL-SCNC: 102 MMOL/L (ref 100–108)
CO2 SERPL-SCNC: 28 MMOL/L (ref 21–32)
CREAT SERPL-MCNC: 1.19 MG/DL (ref 0.6–1.3)
ERYTHROCYTE [DISTWIDTH] IN BLOOD BY AUTOMATED COUNT: 14.6 % (ref 11.6–15.1)
GFR SERPL CREATININE-BSD FRML MDRD: 55 ML/MIN/1.73SQ M
GLUCOSE SERPL-MCNC: 102 MG/DL (ref 65–140)
HCT VFR BLD AUTO: 39.2 % (ref 36.5–49.3)
HGB BLD-MCNC: 12.2 G/DL (ref 12–17)
INR PPP: 2.61 (ref 0.86–1.17)
MCH RBC QN AUTO: 29 PG (ref 26.8–34.3)
MCHC RBC AUTO-ENTMCNC: 31.1 G/DL (ref 31.4–37.4)
MCV RBC AUTO: 93 FL (ref 82–98)
PLATELET # BLD AUTO: 250 THOUSANDS/UL (ref 149–390)
PMV BLD AUTO: 9.7 FL (ref 8.9–12.7)
POTASSIUM SERPL-SCNC: 4.2 MMOL/L (ref 3.5–5.3)
PROTHROMBIN TIME: 28 SECONDS (ref 11.8–14.2)
RBC # BLD AUTO: 4.2 MILLION/UL (ref 3.88–5.62)
SODIUM SERPL-SCNC: 139 MMOL/L (ref 136–145)
WBC # BLD AUTO: 6.92 THOUSAND/UL (ref 4.31–10.16)

## 2018-11-21 PROCEDURE — 85610 PROTHROMBIN TIME: CPT

## 2018-11-21 PROCEDURE — 36415 COLL VENOUS BLD VENIPUNCTURE: CPT

## 2018-11-21 PROCEDURE — 80048 BASIC METABOLIC PNL TOTAL CA: CPT

## 2018-11-21 PROCEDURE — 85730 THROMBOPLASTIN TIME PARTIAL: CPT

## 2018-11-21 PROCEDURE — 71046 X-RAY EXAM CHEST 2 VIEWS: CPT

## 2018-11-21 PROCEDURE — 85027 COMPLETE CBC AUTOMATED: CPT

## 2018-11-21 NOTE — PRE-PROCEDURE INSTRUCTIONS
Pre-Surgery Instructions:   Medication Instructions    amLODIPine (NORVASC) 10 mg tablet Patient was instructed by Physician and understands   aspirin 81 MG tablet Patient was instructed by Physician and understands   atorvastatin (LIPITOR) 40 mg tablet Patient was instructed by Physician and understands   atropine (ISOPTO ATROPINE) 1 % ophthalmic solution Patient was instructed by Physician and understands   Cholecalciferol (VITAMIN D3) 1000 units CAPS Patient was instructed by Physician and understands   losartan (COZAAR) 50 mg tablet Patient was instructed by Physician and understands   niacin (NIASPAN) 750 MG CR tablet Patient was instructed by Physician and understands   prednisoLONE acetate (PRED FORTE) 1 % ophthalmic suspension Patient was instructed by Physician and understands   timolol (TIMOPTIC) 0 5 % ophthalmic solution Patient was instructed by Physician and understands   warfarin (COUMADIN) 5 mg tablet Patient was instructed by Physician and understands  Patient seen by Neetu Roque to take no meds  the morning of surgery  Patient given/ instructed on use of chlorhexidine soap per hospital protocol    Patient instructed to stop all ASA, NSAIDS, vitamins and herbal supplements one week prior to surgery or per Dr Homero Davidson

## 2018-11-21 NOTE — ANESTHESIA PREPROCEDURE EVALUATION
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) , residual symptoms, Visual impairment (Blind R eye; 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),   Comment: Idiopathic progressive polyneuropathy Psychology   Negative psychology ROS              Physical Exam    Airway    Mallampati score: II         Dental   upper dentures,     Cardiovascular  Rhythm: irregular, Rate: normal,     Pulmonary  Pulmonary exam normal Breath sounds clear to auscultation,     Other Findings        Anesthesia Plan  ASA Score- 3     Anesthesia Type- general with ASA Monitors  Additional Monitors:   Airway Plan: ETT  Plan Factors-Patient not instructed to abstain from smoking on day of procedure  Patient did not smoke on day of surgery  Induction- intravenous  Postoperative Plan-     Informed Consent- Anesthetic plan and risks discussed with patient  I personally reviewed this patient with the CRNA  Discussed and agreed on the Anesthesia Plan with the CRNA  Marcie Thomas

## 2018-11-26 ENCOUNTER — TELEPHONE (OUTPATIENT)
Dept: SURGICAL ONCOLOGY | Facility: CLINIC | Age: 83
End: 2018-11-26

## 2018-11-26 NOTE — TELEPHONE ENCOUNTER
Contacted patient after I noticed he had made several calls to the RN line without leaving a message- He states he just wanted to make sure we received a fax from his cardiologist that he should stop the Coumadin 5 days prior to surgery and restart two days post op  I told him I would check with you and if you did NOT receive that form, you could contact his cardiologist directly and if there are any further questions, you would call him back  Thanks!

## 2018-12-03 ENCOUNTER — HOSPITAL ENCOUNTER (OUTPATIENT)
Facility: HOSPITAL | Age: 83
Setting detail: OUTPATIENT SURGERY
Discharge: HOME | End: 2018-12-03
Attending: SURGERY | Admitting: SURGERY
Payer: MEDICARE

## 2018-12-03 ENCOUNTER — HOSPITAL ENCOUNTER (OUTPATIENT)
Dept: NUCLEAR MEDICINE | Facility: HOSPITAL | Age: 83
Discharge: HOME | End: 2018-12-03
Attending: SURGERY
Payer: MEDICARE

## 2018-12-03 ENCOUNTER — ANESTHESIA (OUTPATIENT)
Dept: PERIOP | Facility: HOSPITAL | Age: 83
End: 2018-12-03
Payer: MEDICARE

## 2018-12-03 VITALS
DIASTOLIC BLOOD PRESSURE: 68 MMHG | TEMPERATURE: 97.6 F | HEIGHT: 67 IN | WEIGHT: 179 LBS | BODY MASS INDEX: 28.09 KG/M2 | OXYGEN SATURATION: 92 % | RESPIRATION RATE: 16 BRPM | SYSTOLIC BLOOD PRESSURE: 128 MMHG | HEART RATE: 68 BPM

## 2018-12-03 DIAGNOSIS — C43.72 MALIGNANT MELANOMA OF LEFT FOOT (HCC): ICD-10-CM

## 2018-12-03 PROCEDURE — 88342 IMHCHEM/IMCYTCHM 1ST ANTB: CPT | Performed by: PATHOLOGY

## 2018-12-03 PROCEDURE — 88341 IMHCHEM/IMCYTCHM EA ADD ANTB: CPT | Performed by: PATHOLOGY

## 2018-12-03 PROCEDURE — 11626 EXC S/N/H/F/G MAL+MRG >4 CM: CPT | Performed by: SURGERY

## 2018-12-03 PROCEDURE — 88307 TISSUE EXAM BY PATHOLOGIST: CPT | Performed by: PATHOLOGY

## 2018-12-03 PROCEDURE — 78195 LYMPH SYSTEM IMAGING: CPT

## 2018-12-03 PROCEDURE — 38900 IO MAP OF SENT LYMPH NODE: CPT | Performed by: SURGERY

## 2018-12-03 PROCEDURE — 88305 TISSUE EXAM BY PATHOLOGIST: CPT | Performed by: PATHOLOGY

## 2018-12-03 PROCEDURE — 38500 BIOPSY/REMOVAL LYMPH NODES: CPT | Performed by: SURGERY

## 2018-12-03 PROCEDURE — A9541 TC99M SULFUR COLLOID: HCPCS

## 2018-12-03 RX ORDER — ONDANSETRON 2 MG/ML
INJECTION INTRAMUSCULAR; INTRAVENOUS AS NEEDED
Status: DISCONTINUED | OUTPATIENT
Start: 2018-12-03 | End: 2018-12-03 | Stop reason: SURG

## 2018-12-03 RX ORDER — MAGNESIUM HYDROXIDE 1200 MG/15ML
LIQUID ORAL AS NEEDED
Status: DISCONTINUED | OUTPATIENT
Start: 2018-12-03 | End: 2018-12-03 | Stop reason: HOSPADM

## 2018-12-03 RX ORDER — FENTANYL CITRATE 50 UG/ML
25 INJECTION, SOLUTION INTRAMUSCULAR; INTRAVENOUS
Status: DISCONTINUED | OUTPATIENT
Start: 2018-12-03 | End: 2018-12-03 | Stop reason: HOSPADM

## 2018-12-03 RX ORDER — NEOSTIGMINE METHYLSULFATE 1 MG/ML
INJECTION INTRAVENOUS AS NEEDED
Status: DISCONTINUED | OUTPATIENT
Start: 2018-12-03 | End: 2018-12-03 | Stop reason: SURG

## 2018-12-03 RX ORDER — CEFAZOLIN SODIUM 1 G/50ML
1000 SOLUTION INTRAVENOUS
Status: DISCONTINUED | OUTPATIENT
Start: 2018-12-03 | End: 2018-12-03 | Stop reason: HOSPADM

## 2018-12-03 RX ORDER — MINERAL OIL
OIL (ML) MISCELLANEOUS AS NEEDED
Status: DISCONTINUED | OUTPATIENT
Start: 2018-12-03 | End: 2018-12-03 | Stop reason: HOSPADM

## 2018-12-03 RX ORDER — FENTANYL CITRATE 50 UG/ML
INJECTION, SOLUTION INTRAMUSCULAR; INTRAVENOUS AS NEEDED
Status: DISCONTINUED | OUTPATIENT
Start: 2018-12-03 | End: 2018-12-03 | Stop reason: SURG

## 2018-12-03 RX ORDER — ONDANSETRON 2 MG/ML
4 INJECTION INTRAMUSCULAR; INTRAVENOUS EVERY 6 HOURS PRN
Status: DISCONTINUED | OUTPATIENT
Start: 2018-12-03 | End: 2018-12-03 | Stop reason: HOSPADM

## 2018-12-03 RX ORDER — HYDROCODONE BITARTRATE AND ACETAMINOPHEN 5; 325 MG/1; MG/1
1 TABLET ORAL EVERY 4 HOURS PRN
Status: DISCONTINUED | OUTPATIENT
Start: 2018-12-03 | End: 2018-12-03 | Stop reason: HOSPADM

## 2018-12-03 RX ORDER — EPHEDRINE SULFATE 50 MG/ML
INJECTION, SOLUTION INTRAVENOUS AS NEEDED
Status: DISCONTINUED | OUTPATIENT
Start: 2018-12-03 | End: 2018-12-03 | Stop reason: SURG

## 2018-12-03 RX ORDER — SODIUM CHLORIDE 9 MG/ML
125 INJECTION, SOLUTION INTRAVENOUS CONTINUOUS
Status: DISCONTINUED | OUTPATIENT
Start: 2018-12-03 | End: 2018-12-03 | Stop reason: HOSPADM

## 2018-12-03 RX ORDER — OXYCODONE HYDROCHLORIDE AND ACETAMINOPHEN 5; 325 MG/1; MG/1
1 TABLET ORAL EVERY 4 HOURS PRN
Status: DISCONTINUED | OUTPATIENT
Start: 2018-12-03 | End: 2018-12-03 | Stop reason: HOSPADM

## 2018-12-03 RX ORDER — GLYCOPYRROLATE 0.2 MG/ML
INJECTION INTRAMUSCULAR; INTRAVENOUS AS NEEDED
Status: DISCONTINUED | OUTPATIENT
Start: 2018-12-03 | End: 2018-12-03 | Stop reason: SURG

## 2018-12-03 RX ORDER — LIDOCAINE HYDROCHLORIDE AND EPINEPHRINE 10; 10 MG/ML; UG/ML
INJECTION, SOLUTION INFILTRATION; PERINEURAL AS NEEDED
Status: DISCONTINUED | OUTPATIENT
Start: 2018-12-03 | End: 2018-12-03 | Stop reason: HOSPADM

## 2018-12-03 RX ORDER — ROCURONIUM BROMIDE 10 MG/ML
INJECTION, SOLUTION INTRAVENOUS AS NEEDED
Status: DISCONTINUED | OUTPATIENT
Start: 2018-12-03 | End: 2018-12-03 | Stop reason: SURG

## 2018-12-03 RX ORDER — PROPOFOL 10 MG/ML
INJECTION, EMULSION INTRAVENOUS AS NEEDED
Status: DISCONTINUED | OUTPATIENT
Start: 2018-12-03 | End: 2018-12-03 | Stop reason: SURG

## 2018-12-03 RX ADMIN — NEOSTIGMINE METHYLSULFATE 3 MG: 1 INJECTION INTRAVENOUS at 17:45

## 2018-12-03 RX ADMIN — SODIUM CHLORIDE 125 ML/HR: 0.9 INJECTION, SOLUTION INTRAVENOUS at 12:46

## 2018-12-03 RX ADMIN — FENTANYL CITRATE 50 MCG: 50 INJECTION, SOLUTION INTRAMUSCULAR; INTRAVENOUS at 16:12

## 2018-12-03 RX ADMIN — DEXAMETHASONE SODIUM PHOSPHATE 8 MG: 10 INJECTION INTRAMUSCULAR; INTRAVENOUS at 15:23

## 2018-12-03 RX ADMIN — CEFAZOLIN SODIUM 2000 MG: 1 SOLUTION INTRAVENOUS at 15:10

## 2018-12-03 RX ADMIN — EPHEDRINE SULFATE 10 MG: 50 INJECTION, SOLUTION INTRAMUSCULAR; INTRAVENOUS; SUBCUTANEOUS at 16:15

## 2018-12-03 RX ADMIN — EPHEDRINE SULFATE 10 MG: 50 INJECTION, SOLUTION INTRAMUSCULAR; INTRAVENOUS; SUBCUTANEOUS at 16:21

## 2018-12-03 RX ADMIN — PROPOFOL 120 MG: 10 INJECTION, EMULSION INTRAVENOUS at 15:14

## 2018-12-03 RX ADMIN — SODIUM CHLORIDE: 0.9 INJECTION, SOLUTION INTRAVENOUS at 17:54

## 2018-12-03 RX ADMIN — GLYCOPYRROLATE 0.4 MG: 0.2 INJECTION, SOLUTION INTRAMUSCULAR; INTRAVENOUS at 17:45

## 2018-12-03 RX ADMIN — ONDANSETRON HYDROCHLORIDE 4 MG: 2 INJECTION, SOLUTION INTRAVENOUS at 17:36

## 2018-12-03 RX ADMIN — ROCURONIUM BROMIDE 50 MG: 10 INJECTION INTRAVENOUS at 15:14

## 2018-12-03 RX ADMIN — FENTANYL CITRATE 100 MCG: 50 INJECTION, SOLUTION INTRAMUSCULAR; INTRAVENOUS at 15:14

## 2018-12-03 RX ADMIN — PROPOFOL 30 MG: 10 INJECTION, EMULSION INTRAVENOUS at 16:12

## 2018-12-03 RX ADMIN — SODIUM CHLORIDE: 0.9 INJECTION, SOLUTION INTRAVENOUS at 15:18

## 2018-12-03 RX ADMIN — EPHEDRINE SULFATE 10 MG: 50 INJECTION, SOLUTION INTRAMUSCULAR; INTRAVENOUS; SUBCUTANEOUS at 16:18

## 2018-12-03 RX ADMIN — FENTANYL CITRATE 50 MCG: 50 INJECTION, SOLUTION INTRAMUSCULAR; INTRAVENOUS at 17:40

## 2018-12-03 RX ADMIN — HYDROCODONE BITARTRATE AND ACETAMINOPHEN 1 TABLET: 5; 325 TABLET ORAL at 19:27

## 2018-12-03 NOTE — H&P
Chief Complaint   Patient presents with    Consult       Patient is here for consult about melanoma          Assessment/Plan:     No problem-specific Assessment & Plan notes found for this encounter          Diagnoses and all orders for this visit:     Malignant melanoma of left foot (HCC)  -     traMADol (ULTRAM) 50 mg tablet; Take 1 tablet (50 mg total) by mouth every 6 (six) hours as needed for moderate pain  -     NM lymphatic melanoma; Future  -     Ambulatory referral to Plastic Surgery; Future  -     Case request operating room: EXCISION WIDE LESION LOWER EXTREMITY;  sln bx left foot melanoma, cosurgery with plastic surgery; Standing  -     Ambulatory referral to Cardiology; Future  -     Basic metabolic panel; Future  -     APTT; Future  -     CBC and Platelet; Future  -     Protime-INR; Future  -     EKG 12 lead; Future  -     XR chest pa & lateral; Future  -     Case request operating room: EXCISION WIDE LESION LOWER EXTREMITY;  sln bx left foot melanoma, cosurgery with plastic surgery     Other orders  -     Incentive spirometry; Standing  -     Insert and maintain IV line; Standing  -     Void On-Call to O R ; Standing  -     Place sequential compression device; Standing  -     Incentive spirometry; Standing  -     Insert and maintain IV line; Standing  -     Void On-Call to O R ; Standing  -     Place sequential compression device; Standing         Advance Care Planning/Advance Directives:  Discussed disease status, cancer treatment plans and/or cancer treatment goals with the patient         No history exists          History of Present Illness:  Patient is an 79-year-old man who over the last several months has noticed a brown pigmented lesion which has been present for most of his life on his left lateral dorsal foot start to change appearance and become somewhat raised  He thought this was a blister    This culminated in a biopsy which confirmed a 1 6 mm deep melanoma with 13 mitoses and ulceration present  He was referred for evaluation and treatment  He does report excess of sunburning as a child  No prior skin cancer history  No personal or family history of moles or melanoma      Review of Systems   Constitutional: Negative  HENT: Negative  Eyes: Negative  Respiratory: Negative  Cardiovascular: Negative  Gastrointestinal: Negative  Endocrine: Negative  Genitourinary: Negative  Musculoskeletal: Negative  Skin: Negative  Allergic/Immunologic: Negative  Neurological: Negative  Hematological: Negative  Psychiatric/Behavioral: Negative              There is no problem list on file for this patient           Past Medical History:   Diagnosis Date    Bleeding risk due to Coumadin and aspirin      Coronary artery disease      Glaucoma       right eye    Hyperlipidemia      Hypertension              Past Surgical History:   Procedure Laterality Date    CARDIAC SURGERY         1974 and 27449'L    CORONARY STENT PLACEMENT   1990's     pt has two coronary stents    JOINT REPLACEMENT Left       pt states he has a left thr    TN KNEE SCOPE,MED/LAT MENISECTOMY Left 8/15/2018     Procedure: left KNEE ARTHROSCOPY with medial & lateral menisectomy, chondroplasty,synovectomy & injection;  Surgeon: Olivia Schulz DO;  Location: [address] [place] [address] OR;  Service: Orthopedics            Family History   Problem Relation Age of Onset    Heart disease Mother        Social History            Social History    Marital status:         Spouse name: N/A    Number of children: N/A    Years of education: N/A          Occupational History    Not on file             Social History Main Topics    Smoking status: Former Smoker       Packs/day: 1 50       Quit date: 1974    Smokeless tobacco: Never Used    Alcohol use No    Drug use: No    Sexual activity: Not Currently           Other Topics Concern    Not on file          Social History Narrative    No narrative on file         Current Outpatient Prescriptions:     amLODIPine (NORVASC) 5 mg tablet, 10 mg Daily, Disp: , Rfl:     aspirin 81 MG tablet, Take 81 mg by mouth daily, Disp: , Rfl:     atorvastatin (LIPITOR) 80 mg tablet, Take 40 mg by mouth daily, Disp: , Rfl:     atropine (ISOPTO ATROPINE) 1 % ophthalmic solution, Administer 1 drop to the right eye daily  , Disp: , Rfl:     Calcium Carb-Cholecalciferol (CALCIUM 600-D PO), daily, Disp: , Rfl:     losartan (COZAAR) 50 mg tablet, Daily, Disp: , Rfl:     niacin (NIASPAN) 750 MG CR tablet, Take 750 mg by mouth daily at bedtime, Disp: , Rfl:     prednisoLONE acetate (PRED FORTE) 1 % ophthalmic suspension, Administer 1 drop to the right eye daily  , Disp: , Rfl:     timolol (TIMOPTIC) 0 5 % ophthalmic solution, Administer 1 drop into the left eye 2 (two) times a day, Disp: , Rfl:     warfarin (COUMADIN) 5 mg tablet, Take 5 mg by mouth daily  , Disp: , Rfl:        Allergies   Allergen Reactions    No Active Allergies            Vitals:        BP:    Pulse:    Resp:    Temp:          Physical Exam   Constitutional: He is oriented to person, place, and time  HENT:   Head: Normocephalic and atraumatic  Eyes: Pupils are equal, round, and reactive to light  Conjunctivae are normal    Neck: Normal range of motion  Neck supple  Cardiovascular: Normal rate, regular rhythm, normal heart sounds and intact distal pulses  Pulmonary/Chest: Effort normal and breath sounds normal    Abdominal: Soft  Bowel sounds are normal    Musculoskeletal: Normal range of motion  Neurological: He is alert and oriented to person, place, and time  Skin: Skin is warm and dry  Left lateral portion of foot, raised nodular non pigmented lesion, approximately 1 5 cm in diameter; clinically negative inguinal nodes          Pathology:  Foot biopsy 1 6 mm deep melanoma, 13 mitoses per mm squared, plus ulceration      Labs:  none     Imaging  No results found    I reviewed the above laboratory and imaging data      Discussion/Summary:  Intermediate thickness melanoma, in need for wide excision, with anticipated graft for reconstruction  We will therefore set up to see Plastic surgery team to plan co surgery  Also will need sentinel node biopsy for staging and treatment  Rationale for this discussed with patient and his daughter  All questions answered  Consents signed at this visit    Plan for left foot melanoma excision and left groin sentinel node biopsy

## 2018-12-03 NOTE — OP NOTE
OPERATIVE REPORT  PATIENT NAME: Ngoc Plata    :  3/5/1931  MRN: [id number]  Pt Location: [facility] OR ROOM 01    SURGERY DATE: 12/3/2018    Surgeon(s) and Role:  Panel 1:     * Michael Aponte MD - Primary    Panel 2:     Amelia Dubon MD - Primary    Preop Diagnosis:  Malignant melanoma of left foot ([address] ) [C43 72]    Post-Op Diagnosis Codes:     * Malignant melanoma of left foot ([address] ) [C43 72]    Procedure(s) (LRB):  LYMPHOSCINTIGRAPHY, INTRAOP LYMPHATIC MAPPING ,     SENTINEL NODE BIOPSY (LEFT GROIN),    WIDE EXCISION MELANOMA SCAR LEFT LAT  DORSAL FOOT (Left)      LOCAL FLAP (Left)  STSG HEAD/NECK (N/A)    Specimen(s):  ID Type Source Tests Collected by Time Destination   1 : sentinel node #1 Left groin Tissue Lymph Node, [place] TISSUE EXAM Michael Aponte MD 12/3/2018 1559    2 : sentinel node # 2 left groin Tissue Lymph Node, [place] TISSUE EXAM Michael Aponte MD 12/3/2018 1606    3 : left foot melanoma [id number], 2clip 300, [id number], [id number]  Tissue Soft Tissue, Other TISSUE EXAM Michael Aponte MD 12/3/2018 1625        Estimated Blood Loss:   Minimal    Drains:       Anesthesia Type:   General    Operative Indications:  Malignant melanoma of left foot ([address] ) [C43 72]  Intermediate thickness    Operative Findings:  [place] node 1:  Gamma count 187  [place] node 2:  Gamma count 162    Left foot defect measuring 6 0 x 5 0 x 0 5 cm    Complications:   None    Procedure and Technique:  Patient is a 40-year-old man with an intermediate thick melanoma of the left dorsum of foot  He comes in for wide excision and sentinel node biopsy  He was 1st seen in nuclear medicine suite where he underwent injection of radioactive technetium  Lymphoscintigraphy revealed 2 areas of uptake in the left groin  He was then taken to the OR and identified by proper time-out before being intubated by the anesthesia team   He is then shaved prepped and draped with the left foot and groin exposed    Following this 2 cc of methylene blue was injected in the dermal compartment around the melanoma of the left foot  The 2 marks in the left groin were identified, then incised sharply  Cautery was used to dissect through the dermis and subcu tissue  Use of a gamma probe enabled us locate the 1st sentinel node which had a count of 187  It was dissected out from surrounding tissue in hemostatic fashion with cautery  The field was hemostatic upon removal of the node  We then identified the 2nd zoila in the groin, which was approximately 7 cm more proximal to the 1st zoila  This was incised sharply  Cautery was used to dissect through the dermis  We again identified a hot node with a count of 162  This was dissected out from surrounding tissue in hemostatic fashion with cautery  The field was hemostatic upon removal of the node  Dr Ray Hartman then closed both of these incisions    To the left foot lesion  2 cm margins were drawn around the lesion  The skin was then infiltrated with local anesthesia and incised sharply  Cautery was used to obtain a full-thickness excision  The specimen was oriented with sutures and clips  The resulting defect measured 6 0 x 5 0 x 0 5 cm in size  At this point, Dr Ray Hartman proceeded with reconstruction  Sponge and instrument counts were correct at the end of this portion of the case       I was present for the entire procedure    Patient Disposition:  PACU     SIGNATURE: Parish Manning MD  DATE: December 3, 2018  TIME: 4:47 PM

## 2018-12-03 NOTE — DISCHARGE INSTRUCTIONS
[facility], [address] [facility], [address] [facility], [facility], [address] [facility] /N / [employer]         No ice or heating pack    Ok to shower, but keep left leg dressing dry    Keep left leg elevated as much as possible    No walking on left foot    Keep left foot dressing on and dry    No strenuous activity    Skin glue was applied to the left thigh and abdominal incisions    Call [phone] for an appointment in 7-10 days

## 2018-12-03 NOTE — DISCHARGE SUMMARY
Discharge Summary - Medical Chago Pinto 80 y o  male MRN: [id number]    [address] OR MAIN Room / Bed: OR POOL/OR POOL Encounter: [id number]    BRIEF OVERVIEW  Admitting Provider: Tyrell Bearden MD  Discharge Provider: Tyrell Bearden MD  Primary Care Physician at Discharge: Ignacia Brian [phone]    Discharge To: Home      Admission Date: 12/3/2018     Discharge Date: No discharge date for patient encounter  Code Status: Prior  Advance Directive and Living Will: <no information>  Power of :        Primary Discharge Diagnosis  Principal Problem:    Malignant melanoma of left foot ([address] )  Resolved Problems:    * No resolved hospital problems   *        Discharge Disposition: [address] [place] [address]    Presenting Problem/History of Present Illness  Malignant melanoma of left foot [facility])      Discharge Condition: stable    Patient tolerated the procedure well, recovered in PACU and was discharged home in stable condition    Jessica Rajan MD  12/3/2018  5:57 PM

## 2018-12-03 NOTE — OP NOTE
OPERATIVE REPORT  PATIENT NAME: Manny Malhotra    :  3/5/1931  MRN: [id number]  Pt Location: [facility] OR ROOM 01    SURGERY DATE: 12/3/2018    Surgeon(s) and Role:  Panel 1:     * Jacqueline Walls MD - Primary    Panel 2:     * Brock Chua MD - Primary    Preop Diagnosis:  Malignant melanoma of left foot ([facility] ) [C43 72]    Post-Op Diagnosis Codes:     * Malignant melanoma of left foot ([facility] [address] [facility] ) [C43 72]    Procedure(s) (LRB):  LYMPHOSCINTIGRAPHY, INTRAOP LYMPHATIC MAPPING , SENT NODE BIOPSY, WIDE EXCISION MELANOMA SCAR LEFT LAT  DORSAL FOOT (Left)  SENTINEL NODE BX (Left)  LOCAL FLAP, SKIN GRAFT (Left)    Specimen(s):  ID Type Source Tests Collected by Time Destination   1 : sentinel node #1 Left groin Tissue Lymph Node, [place] TISSUE EXAM Jacqueline Walls MD 12/3/2018 1559    2 : sentinel node # 2 left groin Tissue Lymph Node, [place] TISSUE EXAM Jacqueline Walls MD 12/3/2018 1606    3 : left foot melanoma [id number], 2clip 300, [id number], [id number]  Tissue Soft Tissue, Other TISSUE EXAM Jacqueline Walls MD 12/3/2018 1625        Estimated Blood Loss:   Minimal    Drains:       Anesthesia Type:   General    Operative Indications:  Malignant melanoma of left foot ([facility] ) [C43 72]      Operative Findings:      Complications:   None    Procedure and Technique:  The patient underwent excision of a left foot melanoma and sentinel lymph node biopsy and this will be dictated separately  Time-out procedure was performed and the left lower extremity and abdomen was re-prepped and draped using standard surgical technique  Excellent hemostasis was observed from the 2 thigh a incisions and left lateral foot wound  Each thigh wound measured 3 cm  These were closed using 4-0 PDS for Mackenzie's fascia and the deep dermis followed by 4-0 strata fix suture running subcuticular technique  Skin glue was applied  A local flap was designed in order to narrow the defect  An incision was carried proximally and dorsally    The dorsal skin was elevated subcutaneous plane was advanced volarly for flap plus defect of 5 centimeters squared  The flap was inset using 4-0 PDS suture in interrupted deep dermal technique followed by 4-0 strata fix suture running subcuticular technique  The wound base was examined and excellent hemostasis was observed  The residual defect was 5 cm x 4 5 cm  A template of the defect was then transposed to the left lower abdomen  This pattern was then incorporated into an ellipse  This ellipse was excised in a full-thickness fashion  The skin was cut to the shape of the defect and the fat was removed from the undersurface of the graft  After again confirming excellent hemostasis the graft was placed onto the wound inset with 3-0 nylon and 5 0 fast suture a drainage hole was made in the graft  Xeroform and mineral oil moistened cotton balls were used as a bolster and secured with 3-0 nylon suture  This allowed for excellent stability of the graft  The donor site was closed using 3-0 PDS suture in interrupted deep dermal technique followed by 3-0 strata fix suture running subcuticular technique  All the incisions were cleaned and dried skin glue was applied  The foot was covered with Kerlix gauze and ABD and a gentle Ace wrap     I was present for the entire procedure and A qualified resident physician was not available    Patient Disposition:  hemodynamically stable and extubated and stable    SIGNATURE: Shivani Quiles MD  DATE: December 3, 2018  TIME: 5:50 PM

## 2018-12-05 ENCOUNTER — PATIENT OUTREACH (OUTPATIENT)
Dept: OTHER | Facility: HOSPITAL | Age: 83
End: 2018-12-05

## 2018-12-19 ENCOUNTER — APPOINTMENT (OUTPATIENT)
Dept: LAB | Facility: HOSPITAL | Age: 83
End: 2018-12-19
Payer: MEDICARE

## 2018-12-19 ENCOUNTER — TRANSCRIBE ORDERS (OUTPATIENT)
Dept: ADMINISTRATIVE | Facility: HOSPITAL | Age: 83
End: 2018-12-19

## 2018-12-19 DIAGNOSIS — D68.9 COAGULOPATHY (HCC): ICD-10-CM

## 2018-12-19 DIAGNOSIS — D68.9 COAGULOPATHY (HCC): Primary | ICD-10-CM

## 2018-12-19 LAB
INR PPP: 2.94 (ref 0.9–1.5)
PROTHROMBIN TIME: 34.5 SECONDS (ref 10.2–13)

## 2018-12-19 PROCEDURE — 36415 COLL VENOUS BLD VENIPUNCTURE: CPT

## 2018-12-19 PROCEDURE — 85610 PROTHROMBIN TIME: CPT

## 2018-12-21 ENCOUNTER — OFFICE VISIT (OUTPATIENT)
Dept: SURGICAL ONCOLOGY | Facility: CLINIC | Age: 83
End: 2018-12-21

## 2018-12-21 ENCOUNTER — PATIENT OUTREACH (OUTPATIENT)
Dept: CASE MANAGEMENT | Facility: OTHER | Age: 83
End: 2018-12-21

## 2018-12-21 VITALS
BODY MASS INDEX: 27.47 KG/M2 | DIASTOLIC BLOOD PRESSURE: 70 MMHG | HEIGHT: 67 IN | SYSTOLIC BLOOD PRESSURE: 120 MMHG | HEART RATE: 78 BPM | RESPIRATION RATE: 16 BRPM | TEMPERATURE: 97.5 F | WEIGHT: 175 LBS

## 2018-12-21 DIAGNOSIS — C43.72 MALIGNANT MELANOMA OF LEFT FOOT (HCC): Primary | ICD-10-CM

## 2018-12-21 PROCEDURE — 99024 POSTOP FOLLOW-UP VISIT: CPT | Performed by: SURGERY

## 2018-12-21 NOTE — PROGRESS NOTES
CM called and spoke with  Patient, who reported he had surgery on 12/3 to remove melanoma from left foot  Patient stated he is ambulating with a walker, only putting weight on heel of left foot  Patient stated he had follow up appt today, patient reported physician is pleased with healing at this time  Patient denied any other issues or complaints  CM made patient aware CM will follow up in 1-2 weeks

## 2018-12-21 NOTE — LETTER
December 21, 2018     Cee Rojo MD  [address] [place] [address] [id number] [address] [place] [address]    Patient: Marciano Morel   YOB: 1931   Date of Visit: 12/21/2018       Dear   [address]: Thank you for referring Marciano Morel to me for evaluation  Below are my notes for this consultation  If you have questions, please do not hesitate to call me  I look forward to following your patient along with you  Sincerely,        Samantha Lee MD        CC: KATHARINA Romero MD  12/21/2018  8:40 AM  Sign at close encounter     Surgical Oncology Follow Up       [address]  3/5/1931  [id number]  [facility] [address] [address]    Chief Complaint   Patient presents with    Post-op     Patient is here post-op wide excision of melanoma left foot  Assessment/Plan:    No problem-specific Assessment & Plan notes found for this encounter  Diagnoses and all orders for this visit:    Malignant melanoma of left foot [facility])        Advance Care Planning/Advance Directives:  Discussed disease status, cancer treatment plans and/or cancer treatment goals with the patient          Malignant melanoma of left foot ([facility] [address] )    10/9/2018 Biopsy     Left lateral dorsal foot shave biopsy  Melanoma  1 9 mm  Ulceration: present  Mitotic Rate: 13  lymphovascular invasion: not seen  Tumor regression: not seen  Growth phase: Vertical           12/3/2018 Surgery     Excision of left foot melanoma scar with SLN biopsy  Residual melanoma: 4 3 mm  Darío Level IV  Ulceration present  Margins: Peripheral negative 17 mm                 Deep margins negative 2 mm  Mitotic rate: 13  Lymphovascular invasion: not seen  Lymph nodes 0/2  Stage IIC            History of Present Illness: Stage IIc melanoma left foot, s/p wide excision and sln bx 12/18    -Interval History:  Patient is here for postop check  He reports swelling in the left lower extremity and tingling in the foot since surgery  Review of Systems:  Review of Systems   Constitutional: Negative  HENT: Negative  Eyes: Negative  Respiratory: Negative  Cardiovascular: Negative  Gastrointestinal: Negative  Endocrine: Negative  Genitourinary: Negative  Musculoskeletal: Negative  Skin: Positive for wound  Allergic/Immunologic: Negative  Neurological: Negative  Hematological: Negative  Psychiatric/Behavioral: Negative          Patient Active Problem List   Diagnosis    Malignant melanoma of left foot ([facility] [address] )    Blind right eye    Cerebrovascular accident ([facility] [address] )    Cholelithiasis without obstruction    Coronary arteriosclerosis in native artery    Essential hypertension    Hyperlipidemia    Hypertensive disorder    Idiopathic progressive polyneuropathy    Neovascular glaucoma of left eye    Osteoarthritis    Osteoporosis    Overweight    Vitamin D deficiency    Congestive heart failure (CHF) (HCC)    Coagulopathy (HCC)    Chronic atrial fibrillation (HCC)     Past Medical History:   Diagnosis Date    Anesthesia     "blood pressure went down and than had a blood transfusion after hip replacement"    Anticoagulated on Coumadin     Arthritis     Bleeding risk due to Coumadin and aspirin     Blind right eye     Bruises easily     Bulging lumbar disc     Cancer ([facility] [address] )     LT foot/melanoma    Coronary artery disease     Foot pain     "nerve pains to left foot sometimes"    Glaucoma     right eye    History of coronary artery stent placement     x2 "in the 1990's"    History of left hip replacement     History of transfusion     "i think with left hip replacement about 8 yrs ago, think it was my own blood"    Hyperlipidemia     Hypertension     Irregular heart beat     afib    Left shoulder pain     Low back pain  Osteopenia     Risk for falls     S/P CABG x 1 1974    Seasonal allergies     Shortness of breath     Teeth missing     lower    Use of cane as ambulatory aid     Wears dentures     upper     Past Surgical History:   Procedure Laterality Date   [facility] CARDIAC SURGERY      [address] and [id number]'J    CATARACT EXTRACTION Bilateral     COLONOSCOPY      CORONARY ARTERY BYPASS GRAFT      CORONARY STENT PLACEMENT  1990's    pt has two coronary stents    JOINT REPLACEMENT Left     THR    LYMPH NODE BIOPSY Left 12/3/2018    Procedure: SENTINEL NODE BX;  Surgeon: Evie Reis MD;  Location: [facility];  Service: Surgical Oncology    OR ADJ TISS XFER SCALP,EXTREM <10 SQCM Left 12/3/2018    Procedure: LOCAL FLAP, SKIN GRAFT;  Surgeon: Cristy eB MD;  Location[facility];  Service: Plastics    OR KNEE SCOPE,MED/LAT MENISECTOMY Left 8/15/2018    Procedure: left KNEE ARTHROSCOPY with medial & lateral menisectomy, chondroplasty,synovectomy & injection;  Surgeon: Armando Lee DO;  Location: [facility] MAIN OR;  Service: Orthopedics    SKIN BIOPSY      SKIN LESION EXCISION Left 12/3/2018    Procedure: LYMPHOSCINTIGRAPHY, INTRAOP LYMPHATIC MAPPING , SENT NODE BIOPSY, WIDE EXCISION MELANOMA SCAR LEFT LAT  DORSAL FOOT;  Surgeon: Evie Reis MD;  [facility];  Service: Surgical Oncology    WISDOM TOOTH EXTRACTION       Family History   Problem Relation Age of Onset    Heart disease Mother      Social History     Social History    Marital status:      Spouse name: N/A    Number of children: N/A    Years of education: N/A     Occupational History    Not on file       Social History Main Topics    Smoking status: Former Smoker     Packs/day: 1 50     Quit date: 1974    Smokeless tobacco: Never Used    Alcohol use Yes      Comment: 1 wine weekly    Drug use: No    Sexual activity: Not on file     Other Topics Concern    Not on file     Social History Narrative    No narrative on file       Current Outpatient Prescriptions:     amLODIPine (NORVASC) 10 mg tablet, 10 mg Daily, Disp: , Rfl:     aspirin 81 MG tablet, Take 81 mg by mouth daily, Disp: , Rfl:     atorvastatin (LIPITOR) 40 mg tablet, Take 40 mg by mouth daily, Disp: , Rfl:     atropine (ISOPTO ATROPINE) 1 % ophthalmic solution, Administer 1 drop to the right eye daily  , Disp: , Rfl:     Cholecalciferol (VITAMIN D3) 1000 units CAPS, Take 1,000 Units by mouth daily, Disp: , Rfl:     losartan (COZAAR) 50 mg tablet, Daily, Disp: , Rfl:     niacin (NIASPAN) 750 MG CR tablet, Take 1,500 mg by mouth daily at bedtime  , Disp: , Rfl:     potassium chloride (KLOR-CON) 20 mEq packet, Take 20 mEq by mouth daily, Disp: 30 tablet, Rfl: 0    prednisoLONE acetate (PRED FORTE) 1 % ophthalmic suspension, Administer 1 drop to the right eye daily  , Disp: , Rfl:     timolol (TIMOPTIC) 0 5 % ophthalmic solution, Administer 1 drop into the left eye 2 (two) times a day, Disp: , Rfl:     warfarin (COUMADIN) 5 mg tablet, Take 5 mg by mouth daily  , Disp: , Rfl:     furosemide (LASIX) 40 mg tablet, Take 1 tablet (40 mg total) by mouth daily (Patient not taking: Reported on 12/21/2018 ), Disp: 30 tablet, Rfl: 0    traMADol (ULTRAM) 50 mg tablet, Take 1 tablet (50 mg total) by mouth every 6 (six) hours as needed for moderate pain (Patient not taking: Reported on 12/21/2018 ), Disp: 10 tablet, Rfl: 0  Allergies   Allergen Reactions    No Active Allergies      Vitals:    12/21/18 0809   BP: 120/70   Pulse: 78   Resp: 16   Temp: 97 5 °F (36 4 °C)       Physical Exam   Constitutional: He is oriented to person, place, and time  He appears well-developed and well-nourished  HENT:   Head: Normocephalic and atraumatic  Eyes: Pupils are equal, round, and reactive to light  Conjunctivae are normal    Neck: Normal range of motion  Neck supple  Cardiovascular: Normal rate, regular rhythm, normal heart sounds and intact distal pulses  Abdominal: Soft   Bowel sounds are normal    Musculoskeletal: Normal range of motion  Neurological: He is alert and oriented to person, place, and time  Skin: Skin is warm and dry  No rash noted  No erythema  No pallor  Left foot graft looks good  Left groin incision clean dry intact  Psychiatric: He has a normal mood and affect  His behavior is normal  Judgment and thought content normal          Results:  Labs:  CBC, Coags, BMP, Mg, Phos   Case Report   Surgical Pathology Report                         Case: [id number]                                    Authorizing Provider: Breanne Kwong MD        Collected:           12/03/2018 [id number]               Ordering Location:     [facility]        Received:            12/03/2018 [id number]                                      [facility]                                                      Pathologist:           Yvon Hernandez MD                                                                   Specimens:   A) - Lymph Node, [place], sentinel node #1 Left groin                                               B) - Lymph Node, [place], sentinel node # 2 left groin                                              C) - Skin, Other, left foot melanoma [id number], 2clip 300, [id number], [id number]              Final Diagnosis   A  [place] node #1 Left groin, biopsy:  - No tumor seen in one lymph node (0/1); see note      Note:  Immunostains for S100, HMB45 and Melan A do not reveal metastatic melanoma       B  [place] node # 2 left groin, biopsy:  - No tumor seen in one lymph node (0/1); see note      Note:  Immunostains for S100, HMB45 and Melan A do not reveal metastatic melanoma       C  Skin, left foot, excision:  - Residual melanoma, 4 3 mm; ulcerated  - Prior biopsy site reaction   - Inked margins with no tumor seen       MELANOMA OF SKIN TUMOR STAGING SUMMARY (includes specimens A-C of this case and prior biopsy [id number]  1   Specimen identification:     - Procedure:SLN, resection     - Laterality:lrft   2  Tumor     - Tumor Site:foot     - Tumor Size (excision, required only if tumor grossly present):1 8 cm     - Macroscopic satellite nodule(s):not seen     - Histologic type:nodular     - Maximum tumor (Breslow) thickness (pT4):4 3 mm     - Anatomic (Darío) level:V     - Ulceration:present   3  Margins:     - Peripheral margins:neg (17 mm, 12 o'clock)     - Deep margins:neg (2 mm, center)   4  Mitotic Rate (specify number / mm2):13   5  Microsatellite(s):not seen   6  Lymphovascular invasion:not seen   7  Neurotropism: not seen   8  Tumor-infiltrating lymphocytes:moslty absent   9  Tumor regression (excision, if present < or more than 75%):not seen  10  Growth Phase:vertical  11  Regional Lymph Nodes (pN0):        -- Number of Lymph Nodes Involved:0        -- Number of Lymph Nodes Examined:2        -- Number of Fence Nodes Examined: 2  12: Ancillary Studies:         -- Best representative tumor block:C9-13  13  Additional pathologic findings:  14  AJCC 8th Ed  Pathologic Stage Classification:  at least Stage IIC  - pT4b, pN0         Interpretation [facility] at [facility], [address] [place] [address] [facility]            Electronically signed by Lois Aguayo MD on 12/7/2018 at  2:54 PM       Imaging  Xr Chest Pa & Lateral    Result Date: 11/27/2018  Narrative: CHEST INDICATION:   C43 72: Malignant melanoma of left lower limb, including hip  COMPARISON:  11/8/2018, 8/1/2018 EXAM PERFORMED/VIEWS:  XR CHEST PA & LATERAL FINDINGS: Cardiomediastinal silhouette appears stable with uncoiled thoracic aorta  Status post median sternotomy with mediastinal clips  Improved vascular clarity without overt pulmonary edema  The lungs are clear  No pneumothorax or pleural effusion  Degenerative changes of the spine  Impression: Resolution of vascular congestion  No acute pulmonary disease   Workstation performed: [id number]     Nm Lymphatic Melanoma    Result Date: 12/3/2018  Narrative: SENTINEL NODE LYMPHOSCINTIGRAPHY INDICATION:   Malignant melanoma of the left foot  Localize sentinel node  FINDINGS: 0 54 mCi Tc-99m sulfur colloid (0 6 cc volume) was administered intradermally in divided doses by Dr Armin Stanton in the region of the patients melanoma  Scintigraphic images were obtained over the site of concern in multiple projections  The procedure was performed, monitored, and side of interest marked by Dr Armin Stanton  Impression: [place] lymphoscintigraphy procedure performed, monitored, and marked by Dr Armin Stanton  Workstation performed: [id number]     I reviewed the above laboratory and imaging data  Discussion/Summary:  Stage IIc melanoma, left lower extremity  He looks good  Graft with good take  [place] nodes negative  Plan of follow-up in 4 months with surveillance scan  Will order a baseline scan now given stage of disease    He will continue wound/care follow up with Dr Denise Bermudez

## 2018-12-21 NOTE — PROGRESS NOTES
Surgical Oncology Follow Up       [address]  [facility] [place]  [address] [facility] [address]    Noah Christiansen  3/5/1931  [id number]  [place]  [facility] [place] [address]    Chief Complaint   Patient presents with    Post-op     Patient is here post-op wide excision of melanoma left foot  Assessment/Plan:    No problem-specific Assessment & Plan notes found for this encounter  Diagnoses and all orders for this visit:    Malignant melanoma of left foot [facility])        Advance Care Planning/Advance Directives:  Discussed disease status, cancer treatment plans and/or cancer treatment goals with the patient  Malignant melanoma of left foot ([address] )    10/9/2018 Biopsy     Left lateral dorsal foot shave biopsy  Melanoma  1 9 mm  Ulceration: present  Mitotic Rate: 13  lymphovascular invasion: not seen  Tumor regression: not seen  Growth phase: Vertical           12/3/2018 Surgery     Excision of left foot melanoma scar with SLN biopsy  Residual melanoma: 4 3 mm  Darío Level IV  Ulceration present  Margins: Peripheral negative 17 mm                 Deep margins negative 2 mm  Mitotic rate: 13  Lymphovascular invasion: not seen  Lymph nodes 0/2  Stage IIC            History of Present Illness: Stage IIc melanoma left foot, s/p wide excision and sln bx 12/18    -Interval History:  Patient is here for postop check  He reports swelling in the left lower extremity and tingling in the foot since surgery  Review of Systems:  Review of Systems   Constitutional: Negative  HENT: Negative  Eyes: Negative  Respiratory: Negative  Cardiovascular: Negative  Gastrointestinal: Negative  Endocrine: Negative  Genitourinary: Negative  Musculoskeletal: Negative  Skin: Positive for wound  Allergic/Immunologic: Negative  Neurological: Negative  Hematological: Negative  Psychiatric/Behavioral: Negative          Patient Active Problem List   Diagnosis    Malignant melanoma of left foot ([address] )    Blind right eye    Cerebrovascular accident ([address] )    Cholelithiasis without obstruction    Coronary arteriosclerosis in native artery    Essential hypertension    Hyperlipidemia    Hypertensive disorder    Idiopathic progressive polyneuropathy    Neovascular glaucoma of left eye    Osteoarthritis    Osteoporosis    Overweight    Vitamin D deficiency    Congestive heart failure (CHF) (HCC)    Coagulopathy (HCC)    Chronic atrial fibrillation (HCC)     Past Medical History:   Diagnosis Date    Anesthesia     "blood pressure went down and than had a blood transfusion after hip replacement"    Anticoagulated on Coumadin     Arthritis     Bleeding risk due to Coumadin and aspirin     Blind right eye     Bruises easily     Bulging lumbar disc     Cancer ([address] )     LT foot/melanoma    Coronary artery disease     Foot pain     "nerve pains to left foot sometimes"    Glaucoma     right eye    History of coronary artery stent placement     x2 "in the 1990's"    History of left hip replacement     History of transfusion     "i think with left hip replacement about 8 yrs ago, think it was my own blood"    Hyperlipidemia     Hypertension     Irregular heart beat     afib    Left shoulder pain     Low back pain     Osteopenia     Risk for falls     S/P CABG x 1 1974    Seasonal allergies     Shortness of breath     Teeth missing     lower    Use of cane as ambulatory aid     Wears dentures     upper     Past Surgical History:   Procedure Laterality Date   [facility] CARDIAC SURGERY      1974 and [address]279'B    CATARACT EXTRACTION Bilateral     COLONOSCOPY      CORONARY ARTERY BYPASS GRAFT      CORONARY STENT PLACEMENT  1990's    pt has two coronary stents    JOINT REPLACEMENT Left     THR    LYMPH NODE BIOPSY Left 12/3/2018    Procedure: SENTINEL NODE BX;  Surgeon: Cheyanne Valdez MD;  Location[facility];  Service: Surgical Oncology    SD ADJ TISS XFER SCALP,EXTREM <10 SQCM Left 12/3/2018    Procedure: LOCAL FLAP, SKIN GRAFT;  Surgeon: Flores Mclaughlin MD;  Location[facility];  Service: Plastics    SD KNEE SCOPE,MED/LAT MENISECTOMY Left 8/15/2018    Procedure: left KNEE ARTHROSCOPY with medial & lateral menisectomy, chondroplasty,synovectomy & injection;  Surgeon: Sloane Stewart DO;  [facility];  Service: Orthopedics    SKIN BIOPSY      SKIN LESION EXCISION Left 12/3/2018    Procedure: LYMPHOSCINTIGRAPHY, INTRAOP LYMPHATIC MAPPING , SENT NODE BIOPSY, WIDE EXCISION MELANOMA SCAR LEFT LAT  DORSAL FOOT;  Surgeon: Cheyanne Valdez MD;  [facility];  Service: Surgical Oncology    WISDOM TOOTH EXTRACTION       Family History   Problem Relation Age of Onset    Heart disease Mother      Social History     Social History    Marital status:      Spouse name: N/A    Number of children: N/A    Years of education: N/A     Occupational History    Not on file       Social History Main Topics    Smoking status: Former Smoker     Packs/day: 1 50     Quit date: 1974    Smokeless tobacco: Never Used    Alcohol use Yes      Comment: 1 wine weekly    Drug use: No    Sexual activity: Not on file     Other Topics Concern    Not on file     Social History Narrative    No narrative on file       Current Outpatient Prescriptions:     amLODIPine (NORVASC) 10 mg tablet, 10 mg Daily, Disp: , Rfl:     aspirin 81 MG tablet, Take 81 mg by mouth daily, Disp: , Rfl:     atorvastatin (LIPITOR) 40 mg tablet, Take 40 mg by mouth daily, Disp: , Rfl:     atropine (ISOPTO ATROPINE) 1 % ophthalmic solution, Administer 1 drop to the right eye daily  , Disp: , Rfl:     Cholecalciferol (VITAMIN D3) 1000 units CAPS, Take 1,000 Units by mouth daily, Disp: , Rfl:     losartan (COZAAR) 50 mg tablet, Daily, Disp: , Rfl:     niacin (NIASPAN) 750 MG CR tablet, Take 1,500 mg by mouth daily at bedtime  , Disp: , Rfl:     potassium chloride (KLOR-CON) 20 mEq packet, Take 20 mEq by mouth daily, Disp: 30 tablet, Rfl: 0    prednisoLONE acetate (PRED FORTE) 1 % ophthalmic suspension, Administer 1 drop to the right eye daily  , Disp: , Rfl:     timolol (TIMOPTIC) 0 5 % ophthalmic solution, Administer 1 drop into the left eye 2 (two) times a day, Disp: , Rfl:     warfarin (COUMADIN) 5 mg tablet, Take 5 mg by mouth daily  , Disp: , Rfl:     furosemide (LASIX) 40 mg tablet, Take 1 tablet (40 mg total) by mouth daily (Patient not taking: Reported on 12/21/2018 ), Disp: 30 tablet, Rfl: 0    traMADol (ULTRAM) 50 mg tablet, Take 1 tablet (50 mg total) by mouth every 6 (six) hours as needed for moderate pain (Patient not taking: Reported on 12/21/2018 ), Disp: 10 tablet, Rfl: 0  Allergies   Allergen Reactions    No Active Allergies      Vitals:    12/21/18 0809   BP: 120/70   Pulse: 78   Resp: 16   Temp: 97 5 °F (36 4 °C)       Physical Exam   Constitutional: He is oriented to person, place, and time  He appears well-developed and well-nourished  HENT:   Head: Normocephalic and atraumatic  Eyes: Pupils are equal, round, and reactive to light  Conjunctivae are normal    Neck: Normal range of motion  Neck supple  Cardiovascular: Normal rate, regular rhythm, normal heart sounds and intact distal pulses  Abdominal: Soft  Bowel sounds are normal    Musculoskeletal: Normal range of motion  Neurological: He is alert and oriented to person, place, and time  Skin: Skin is warm and dry  No rash noted  No erythema  No pallor  Left foot graft looks good  Left groin incision clean dry intact  Psychiatric: He has a normal mood and affect   His behavior is normal  Judgment and thought content normal          Results:  Labs:  CBC, Coags, BMP, Mg, Phos   Case Report   Surgical Pathology Report                         Case: [id number]                                    Authorizing Provider: Tyrell Bearden MD        Collected:           12/03/2018 1559               Ordering Location:     [facility]        Received:            12/03/2018 1393                                      [facility]                                                      Pathologist:           Alycia Lynch MD                                                                   Specimens:   A) - Lymph Node, Davison, sentinel node #1 Left groin                                               B) - Lymph Node, [place], sentinel node # 2 left groin                                              C) - Skin, Other, left foot melanoma [id number], 2clip 300, [id number], [id number]              Final Diagnosis   A  [place] node #1 Left groin, biopsy:  - No tumor seen in one lymph node (0/1); see note      Note:  Immunostains for S100, HMB45 and Melan A do not reveal metastatic melanoma       B  [place] node # 2 left groin, biopsy:  - No tumor seen in one lymph node (0/1); see note      Note:  Immunostains for S100, HMB45 and Melan A do not reveal metastatic melanoma       C  Skin, left foot, excision:  - Residual melanoma, 4 3 mm; ulcerated  - Prior biopsy site reaction   - Inked margins with no tumor seen       MELANOMA OF SKIN TUMOR STAGING SUMMARY (includes specimens A-C of this case and prior biopsy [id number]  1  Specimen identification:     - Procedure:SLN, resection     - Laterality:lrft   2  Tumor     - Tumor Site:foot     - Tumor Size (excision, required only if tumor grossly present):1 8 cm     - Macroscopic satellite nodule(s):not seen     - Histologic type:nodular     - Maximum tumor ([place]) thickness (pT4):4 3 mm     - Anatomic (Darío) level:V     - Ulceration:present   3  Margins:     - Peripheral margins:neg (17 mm, 12 o'clock)     - Deep margins:neg (2 mm, center)   4  Mitotic Rate (specify number / mm2):13   5  Microsatellite(s):not seen   6  Lymphovascular invasion:not seen   7  Neurotropism: not seen   8  Tumor-infiltrating lymphocytes:moslty absent   9  Tumor regression (excision, if present < or more than 75%):not seen  10  Growth Phase:vertical  11  Regional Lymph Nodes (pN0):        -- Number of Lymph Nodes Involved:0        -- Number of Lymph Nodes Examined:2        -- Number of [place] Nodes Examined: 2  12: Ancillary Studies:         -- Best representative tumor block:C9-13  13  Additional pathologic findings:  14  AJCC 8th Ed  Pathologic Stage Classification:  at least Stage IIC  - pT4b, pN0         Interpretation performed at [facility], [address] [place] [address] [facility]            Electronically signed by Ivet Gunter MD on 12/7/2018 at  2:54 PM       Imaging  Xr Chest Pa & Lateral    Result Date: 11/27/2018  Narrative: CHEST INDICATION:   C43 72: Malignant melanoma of left lower limb, including hip  COMPARISON:  11/8/2018, 8/1/2018 EXAM PERFORMED/VIEWS:  XR CHEST PA & LATERAL FINDINGS: Cardiomediastinal silhouette appears stable with uncoiled thoracic aorta  Status post median sternotomy with mediastinal clips  Improved vascular clarity without overt pulmonary edema  The lungs are clear  No pneumothorax or pleural effusion  Degenerative changes of the spine  Impression: Resolution of vascular congestion  No acute pulmonary disease  Workstation performed: [id number]     Nm Lymphatic Melanoma    Result Date: 12/3/2018  Narrative: SENTINEL NODE LYMPHOSCINTIGRAPHY INDICATION:   Malignant melanoma of the left foot  Localize sentinel node  FINDINGS: 0 54 mCi Tc-99m sulfur colloid (0 6 cc volume) was administered intradermally in divided doses by Dr Ross Jarquin in the region of the patients melanoma  Scintigraphic images were obtained over the site of concern in multiple projections  The procedure was performed, monitored, and side of interest marked by Dr Ross Jarquin  Impression: [place] lymphoscintigraphy procedure performed, monitored, and marked by Dr Ross Jarquin   Workstation performed: [id number]     I reviewed the above laboratory and imaging data  Discussion/Summary:  Stage IIc melanoma, left lower extremity  He looks good  Graft with good take  [place] nodes negative  Plan of follow-up in 4 months with surveillance scan  Will order a baseline scan now given stage of disease    He will continue wound/care follow up with Dr Prescott Odor

## 2018-12-27 ENCOUNTER — HOSPITAL ENCOUNTER (OUTPATIENT)
Dept: CT IMAGING | Facility: HOSPITAL | Age: 83
Discharge: HOME | End: 2018-12-27
Payer: MEDICARE

## 2018-12-27 DIAGNOSIS — C43.72 MALIGNANT MELANOMA OF LEFT FOOT (HCC): ICD-10-CM

## 2018-12-27 PROCEDURE — 71260 CT THORAX DX C+: CPT

## 2018-12-27 PROCEDURE — 74177 CT ABD & PELVIS W/CONTRAST: CPT

## 2018-12-27 RX ADMIN — IOHEXOL 80 ML: 350 INJECTION, SOLUTION INTRAVENOUS at 09:07

## 2019-01-14 ENCOUNTER — TRANSCRIBE ORDERS (OUTPATIENT)
Dept: ADMINISTRATIVE | Facility: HOSPITAL | Age: 84
End: 2019-01-14

## 2019-01-14 ENCOUNTER — APPOINTMENT (OUTPATIENT)
Dept: LAB | Facility: HOSPITAL | Age: 84
End: 2019-01-14
Payer: MEDICARE

## 2019-01-14 DIAGNOSIS — D68.9 COAGULOPATHY (HCC): Primary | ICD-10-CM

## 2019-01-14 DIAGNOSIS — D68.9 COAGULOPATHY (HCC): ICD-10-CM

## 2019-01-14 LAB
INR PPP: 2.59 (ref 0.9–1.5)
PROTHROMBIN TIME: 30.3 SECONDS (ref 10.2–13)

## 2019-01-14 PROCEDURE — 85610 PROTHROMBIN TIME: CPT

## 2019-01-14 PROCEDURE — 36415 COLL VENOUS BLD VENIPUNCTURE: CPT

## 2019-01-23 ENCOUNTER — APPOINTMENT (EMERGENCY)
Dept: NON INVASIVE DIAGNOSTICS | Facility: HOSPITAL | Age: 84
DRG: 857 | End: 2019-01-23
Payer: MEDICARE

## 2019-01-23 ENCOUNTER — HOSPITAL ENCOUNTER (INPATIENT)
Facility: HOSPITAL | Age: 84
LOS: 8 days | Discharge: NURSING, REHAB OR LONG-TERM CARE | DRG: 857 | End: 2019-01-31
Attending: EMERGENCY MEDICINE | Admitting: INTERNAL MEDICINE
Payer: MEDICARE

## 2019-01-23 ENCOUNTER — TELEPHONE (OUTPATIENT)
Dept: OTHER | Facility: OTHER | Age: 84
End: 2019-01-23

## 2019-01-23 ENCOUNTER — APPOINTMENT (EMERGENCY)
Dept: ULTRASOUND IMAGING | Facility: HOSPITAL | Age: 84
DRG: 857 | End: 2019-01-23
Payer: MEDICARE

## 2019-01-23 DIAGNOSIS — L02.416 CELLULITIS AND ABSCESS OF LEFT LOWER EXTREMITY: Primary | ICD-10-CM

## 2019-01-23 DIAGNOSIS — I88.9 LYMPHADENITIS: ICD-10-CM

## 2019-01-23 DIAGNOSIS — C43.72 MALIGNANT MELANOMA OF LEFT FOOT (HCC): Chronic | ICD-10-CM

## 2019-01-23 DIAGNOSIS — L03.116 CELLULITIS AND ABSCESS OF LEFT LOWER EXTREMITY: Primary | ICD-10-CM

## 2019-01-23 PROBLEM — L03.90 CELLULITIS: Status: ACTIVE | Noted: 2019-01-23

## 2019-01-23 PROBLEM — D72.823 LEUKEMOID REACTION: Status: ACTIVE | Noted: 2019-01-23

## 2019-01-23 PROBLEM — R10.30 GROIN PAIN: Status: ACTIVE | Noted: 2019-01-23

## 2019-01-23 PROBLEM — H40.9 GLAUCOMA OF RIGHT EYE: Chronic | Status: ACTIVE | Noted: 2019-01-23

## 2019-01-23 PROBLEM — D72.823 LEUKEMOID REACTION: Chronic | Status: ACTIVE | Noted: 2019-01-23

## 2019-01-23 PROBLEM — I10 HYPERTENSIVE DISORDER: Status: RESOLVED | Noted: 2017-01-31 | Resolved: 2019-01-23

## 2019-01-23 PROBLEM — D68.9 COAGULOPATHY (HCC): Chronic | Status: ACTIVE | Noted: 2018-11-09

## 2019-01-23 PROBLEM — L03.90 CELLULITIS: Chronic | Status: ACTIVE | Noted: 2019-01-23

## 2019-01-23 PROBLEM — R10.30 GROIN PAIN: Chronic | Status: ACTIVE | Noted: 2019-01-23

## 2019-01-23 PROBLEM — I10 ESSENTIAL HYPERTENSION: Chronic | Status: ACTIVE | Noted: 2017-04-25

## 2019-01-23 LAB
ANION GAP SERPL CALCULATED.3IONS-SCNC: 10 MMOL/L (ref 4–13)
APTT PPP: 48 SECONDS (ref 26–38)
BASOPHILS # BLD AUTO: 0 THOUSANDS/ΜL (ref 0–0.1)
BASOPHILS NFR BLD AUTO: 0 % (ref 0–2)
BUN SERPL-MCNC: 24 MG/DL (ref 7–25)
CALCIUM SERPL-MCNC: 8.7 MG/DL (ref 8.6–10.5)
CHLORIDE SERPL-SCNC: 98 MMOL/L (ref 98–107)
CO2 SERPL-SCNC: 26 MMOL/L (ref 21–31)
CREAT SERPL-MCNC: 1.13 MG/DL (ref 0.7–1.3)
EOSINOPHIL # BLD AUTO: 0.1 THOUSAND/ΜL (ref 0–0.61)
EOSINOPHIL NFR BLD AUTO: 1 % (ref 0–5)
ERYTHROCYTE [DISTWIDTH] IN BLOOD BY AUTOMATED COUNT: 15.6 % (ref 11.5–14.5)
GFR SERPL CREATININE-BSD FRML MDRD: 58 ML/MIN/1.73SQ M
GLUCOSE SERPL-MCNC: 114 MG/DL (ref 65–99)
HCT VFR BLD AUTO: 37.9 % (ref 36.5–49.3)
HGB BLD-MCNC: 12.1 G/DL (ref 14–18)
INR PPP: 4.05 (ref 0.9–1.5)
LACTATE SERPL-SCNC: 1.3 MMOL/L (ref 0.5–2)
LYMPHOCYTES # BLD AUTO: 1 THOUSANDS/ΜL (ref 0.6–4.47)
LYMPHOCYTES NFR BLD AUTO: 8 % (ref 21–51)
MCH RBC QN AUTO: 27.9 PG (ref 26–34)
MCHC RBC AUTO-ENTMCNC: 31.8 G/DL (ref 31–37)
MCV RBC AUTO: 88 FL (ref 81–99)
MONOCYTES # BLD AUTO: 1 THOUSAND/ΜL (ref 0.17–1.22)
MONOCYTES NFR BLD AUTO: 8 % (ref 2–12)
NEUTROPHILS # BLD AUTO: 10.3 THOUSANDS/ΜL (ref 1.4–6.5)
NEUTS SEG NFR BLD AUTO: 83 % (ref 42–75)
NRBC BLD AUTO-RTO: 0 /100 WBCS
PLATELET # BLD AUTO: 184 THOUSANDS/UL (ref 149–390)
PMV BLD AUTO: 8.5 FL (ref 8.6–11.7)
POTASSIUM SERPL-SCNC: 3.8 MMOL/L (ref 3.5–5.5)
PROTHROMBIN TIME: 47.7 SECONDS (ref 10.2–13)
RBC # BLD AUTO: 4.31 MILLION/UL (ref 4.3–5.9)
SODIUM SERPL-SCNC: 134 MMOL/L (ref 134–143)
WBC # BLD AUTO: 12.4 THOUSAND/UL (ref 4.8–10.8)

## 2019-01-23 PROCEDURE — 99285 EMERGENCY DEPT VISIT HI MDM: CPT

## 2019-01-23 PROCEDURE — 85025 COMPLETE CBC W/AUTO DIFF WBC: CPT | Performed by: EMERGENCY MEDICINE

## 2019-01-23 PROCEDURE — 80048 BASIC METABOLIC PNL TOTAL CA: CPT | Performed by: EMERGENCY MEDICINE

## 2019-01-23 PROCEDURE — 96374 THER/PROPH/DIAG INJ IV PUSH: CPT

## 2019-01-23 PROCEDURE — 85730 THROMBOPLASTIN TIME PARTIAL: CPT | Performed by: EMERGENCY MEDICINE

## 2019-01-23 PROCEDURE — 76882 US LMTD JT/FCL EVL NVASC XTR: CPT

## 2019-01-23 PROCEDURE — 93005 ELECTROCARDIOGRAM TRACING: CPT

## 2019-01-23 PROCEDURE — 93971 EXTREMITY STUDY: CPT

## 2019-01-23 PROCEDURE — 83605 ASSAY OF LACTIC ACID: CPT | Performed by: EMERGENCY MEDICINE

## 2019-01-23 PROCEDURE — 36415 COLL VENOUS BLD VENIPUNCTURE: CPT | Performed by: EMERGENCY MEDICINE

## 2019-01-23 PROCEDURE — 85610 PROTHROMBIN TIME: CPT | Performed by: EMERGENCY MEDICINE

## 2019-01-23 PROCEDURE — 99223 1ST HOSP IP/OBS HIGH 75: CPT | Performed by: NURSE PRACTITIONER

## 2019-01-23 PROCEDURE — 87040 BLOOD CULTURE FOR BACTERIA: CPT | Performed by: EMERGENCY MEDICINE

## 2019-01-23 RX ORDER — WARFARIN SODIUM 5 MG/1
5 TABLET ORAL
Status: DISCONTINUED | OUTPATIENT
Start: 2019-01-24 | End: 2019-01-23

## 2019-01-23 RX ORDER — VANCOMYCIN HYDROCHLORIDE 1 G/200ML
1000 INJECTION, SOLUTION INTRAVENOUS EVERY 12 HOURS
Status: DISCONTINUED | OUTPATIENT
Start: 2019-01-24 | End: 2019-01-23

## 2019-01-23 RX ORDER — ASPIRIN 81 MG/1
81 TABLET, CHEWABLE ORAL DAILY
Status: DISCONTINUED | OUTPATIENT
Start: 2019-01-24 | End: 2019-01-31 | Stop reason: HOSPADM

## 2019-01-23 RX ORDER — ATORVASTATIN CALCIUM 40 MG/1
40 TABLET, FILM COATED ORAL
Status: DISCONTINUED | OUTPATIENT
Start: 2019-01-23 | End: 2019-01-31 | Stop reason: HOSPADM

## 2019-01-23 RX ORDER — MELATONIN
1000 DAILY
Status: DISCONTINUED | OUTPATIENT
Start: 2019-01-24 | End: 2019-01-31 | Stop reason: HOSPADM

## 2019-01-23 RX ORDER — ONDANSETRON 2 MG/ML
4 INJECTION INTRAMUSCULAR; INTRAVENOUS EVERY 6 HOURS PRN
Status: DISCONTINUED | OUTPATIENT
Start: 2019-01-23 | End: 2019-01-31 | Stop reason: HOSPADM

## 2019-01-23 RX ORDER — NIACIN 500 MG/1
1500 TABLET, EXTENDED RELEASE ORAL
Status: DISCONTINUED | OUTPATIENT
Start: 2019-01-23 | End: 2019-01-31 | Stop reason: HOSPADM

## 2019-01-23 RX ORDER — VANCOMYCIN HYDROCHLORIDE 1 G/200ML
1000 INJECTION, SOLUTION INTRAVENOUS ONCE
Status: COMPLETED | OUTPATIENT
Start: 2019-01-23 | End: 2019-01-23

## 2019-01-23 RX ORDER — LOSARTAN POTASSIUM 50 MG/1
50 TABLET ORAL DAILY
Status: DISCONTINUED | OUTPATIENT
Start: 2019-01-24 | End: 2019-01-31 | Stop reason: HOSPADM

## 2019-01-23 RX ORDER — AMLODIPINE BESYLATE 5 MG/1
10 TABLET ORAL DAILY
Status: DISCONTINUED | OUTPATIENT
Start: 2019-01-24 | End: 2019-01-31 | Stop reason: HOSPADM

## 2019-01-23 RX ORDER — ATROPINE SULFATE 10 MG/ML
1 SOLUTION/ DROPS OPHTHALMIC DAILY
Status: DISCONTINUED | OUTPATIENT
Start: 2019-01-24 | End: 2019-01-31 | Stop reason: HOSPADM

## 2019-01-23 RX ORDER — SODIUM CHLORIDE 9 MG/ML
125 INJECTION, SOLUTION INTRAVENOUS CONTINUOUS
Status: DISCONTINUED | OUTPATIENT
Start: 2019-01-23 | End: 2019-01-24

## 2019-01-23 RX ORDER — TIMOLOL MALEATE 5 MG/ML
1 SOLUTION/ DROPS OPHTHALMIC 2 TIMES DAILY
Status: DISCONTINUED | OUTPATIENT
Start: 2019-01-23 | End: 2019-01-31 | Stop reason: HOSPADM

## 2019-01-23 RX ORDER — PREDNISOLONE ACETATE 10 MG/ML
1 SUSPENSION/ DROPS OPHTHALMIC DAILY
Status: DISCONTINUED | OUTPATIENT
Start: 2019-01-24 | End: 2019-01-31 | Stop reason: HOSPADM

## 2019-01-23 RX ADMIN — AMPICILLIN SODIUM AND SULBACTAM SODIUM 3 G: 2; 1 INJECTION, POWDER, FOR SOLUTION INTRAMUSCULAR; INTRAVENOUS at 17:56

## 2019-01-23 RX ADMIN — VANCOMYCIN HYDROCHLORIDE 1000 MG: 1 INJECTION, SOLUTION INTRAVENOUS at 19:30

## 2019-01-23 RX ADMIN — NIACIN 1500 MG: 500 TABLET, FILM COATED, EXTENDED RELEASE ORAL at 21:01

## 2019-01-23 RX ADMIN — MORPHINE SULFATE 1 MG: 2 INJECTION, SOLUTION INTRAMUSCULAR; INTRAVENOUS at 20:41

## 2019-01-23 RX ADMIN — SODIUM CHLORIDE 125 ML/HR: 9 INJECTION, SOLUTION INTRAVENOUS at 20:01

## 2019-01-23 NOTE — ED NOTES
Edematous erythemic area to left upper thigh,groin area,tender and firm     Geo Marion RN  01/23/19 [id number]

## 2019-01-23 NOTE — ED PROVIDER NOTES
History  Chief Complaint   Patient presents with    Groin Pain     left inguinal swelling  Patient had lymph nodes removed due to melanoma and states that area is swelling up again  Groin pain, left side, with redness and swelling, over past couple of days, no acute trauma, no fever/CP/SOB  Has hx malignant melanoma of left foot, and prior left groin ?lymph node dissection and subsequent hematoma/seroma  Is on Coumadin w/hx afib  Prior to Admission Medications   Prescriptions Last Dose Informant Patient Reported? Taking?    amLODIPine (NORVASC) 10 mg tablet 1/23/2019 at 0800  Yes Yes   Sig: 10 mg Daily   aspirin 81 MG tablet 1/23/2019 at 0800  Yes Yes   Sig: Take 81 mg by mouth daily   atorvastatin (LIPITOR) 40 mg tablet 1/23/2019 at 1200  Yes Yes   Sig: Take 40 mg by mouth daily   atropine (ISOPTO ATROPINE) 1 % ophthalmic solution 1/23/2019 at 0800 Self Yes Yes   Sig: Administer 1 drop to the right eye daily     losartan (COZAAR) 50 mg tablet 1/23/2019 at 0800  Yes Yes   Sig: Daily   niacin (NIASPAN) 750 MG CR tablet 1/22/2019 at 1800  Yes Yes   Sig: Take 1,500 mg by mouth daily at bedtime     prednisoLONE acetate (PRED FORTE) 1 % ophthalmic suspension 1/23/2019 at 0800 Self Yes Yes   Sig: Administer 1 drop to the right eye daily     timolol (TIMOPTIC) 0 5 % ophthalmic solution 1/23/2019 at 0800 Self Yes Yes   Sig: Administer 1 drop into the left eye 2 (two) times a day   warfarin (COUMADIN) 5 mg tablet 1/23/2019 at 1200 Self Yes Yes   Sig: Take 5 mg by mouth daily        Facility-Administered Medications: None       Past Medical History:   Diagnosis Date    Anesthesia     "blood pressure went down and than had a blood transfusion after hip replacement"    Anticoagulated on Coumadin     Arthritis     Bleeding risk due to Coumadin and aspirin     Blind right eye     Bruises easily     Bulging lumbar disc     Cancer (HCC)     LT foot/melanoma    Coronary artery disease     Foot pain "nerve pains to left foot sometimes"    Glaucoma     right eye    History of coronary artery stent placement     x2 "in the 1990's"    History of left hip replacement     History of transfusion     "i think with left hip replacement about 8 yrs ago, think it was my own blood"    Hyperlipidemia     Hypertension     Irregular heart beat     afib    Left shoulder pain     Low back pain     Osteopenia     Risk for falls     S/P CABG x 1 1974    Seasonal allergies     Shortness of breath     Teeth missing     lower    Use of cane as ambulatory aid     Wears dentures     upper       Past Surgical History:   Procedure Laterality Date   [facility] CARDIAC SURGERY      9299 and 26505'A    CATARACT EXTRACTION Bilateral     COLONOSCOPY      CORONARY ARTERY BYPASS GRAFT      CORONARY STENT PLACEMENT  1990's    pt has two coronary stents    JOINT REPLACEMENT Left     THR    LYMPH NODE BIOPSY Left 12/3/2018    Procedure: SENTINEL NODE BX;  Surgeon: Ebenezer Rich MD;  [facility];  Service: Surgical Oncology    NJ ADJ TISS XFER SCALP,EXTREM <10 SQCM Left 12/3/2018    Procedure: LOCAL FLAP, SKIN GRAFT;  Surgeon: Ladan Navas MD;  [facility];  Service: Plastics    NJ KNEE SCOPE,MED/LAT MENISECTOMY Left 8/15/2018    Procedure: left KNEE ARTHROSCOPY with medial & lateral menisectomy, chondroplasty,synovectomy & injection;  Surgeon: Mookie Hines DO;  Location: [address] [place] [address] MAIN OR;  Service: Orthopedics    SKIN BIOPSY      SKIN LESION EXCISION Left 12/3/2018    Procedure: LYMPHOSCINTIGRAPHY, INTRAOP LYMPHATIC MAPPING , SENT NODE BIOPSY, WIDE EXCISION MELANOMA SCAR LEFT LAT  DORSAL FOOT;  Surgeon: Ebenezer Rich MD;  [facility];  Service: Surgical Oncology    WISDOM TOOTH EXTRACTION         Family History   Problem Relation Age of Onset    Heart disease Mother      I have reviewed and agree with the history as documented      Social History   Substance Use Topics    Smoking status: Former Smoker Packs/day: 1 50     Quit date: 65    Smokeless tobacco: Never Used    Alcohol use Yes      Comment: 1 wine weekly        Review of Systems   Constitutional: Negative for chills and fever  HENT: Negative for rhinorrhea and sore throat  Eyes: Negative for visual disturbance  Respiratory: Negative for cough and shortness of breath  Cardiovascular: Positive for leg swelling  Negative for chest pain  Gastrointestinal: Negative for abdominal pain, diarrhea, nausea and vomiting  Genitourinary: Negative for dysuria  Musculoskeletal: Negative for back pain and myalgias  Skin: Negative for rash  Neurological: Negative for dizziness and headaches  Psychiatric/Behavioral: Negative for confusion  All other systems reviewed and are negative  Physical Exam  Physical Exam   Constitutional: He is oriented to person, place, and time  He appears well-developed and well-nourished  HENT:   Nose: Nose normal    Mouth/Throat: Oropharynx is clear and moist  No oropharyngeal exudate  Eyes: Pupils are equal, round, and reactive to light  Conjunctivae and EOM are normal  No scleral icterus  Neck: Normal range of motion  Neck supple  No JVD present  No tracheal deviation present  Cardiovascular: Normal rate and normal heart sounds  An irregular rhythm present  No murmur heard  Pulmonary/Chest: Effort normal and breath sounds normal  No respiratory distress  He has no wheezes  He has no rales  Abdominal: Soft  Bowel sounds are normal  There is no tenderness  There is no guarding  Musculoskeletal: Normal range of motion  He exhibits no edema or tenderness  Legs:       Feet:    Neurological: He is alert and oriented to person, place, and time  No cranial nerve deficit or sensory deficit  He exhibits normal muscle tone  5/5 motor, nl sens   Skin: Skin is warm and dry  Psychiatric: He has a normal mood and affect  His behavior is normal    Nursing note and vitals reviewed        Vital Signs  ED Triage Vitals   Temperature Pulse Respirations Blood Pressure SpO2   01/23/19 1554 01/23/19 1554 01/23/19 1554 01/23/19 1554 01/23/19 1554   97 6 °F (36 4 °C) (!) 111 16 109/53 98 %      Temp Source Heart Rate Source Patient Position - Orthostatic VS BP Location FiO2 (%)   01/23/19 1554 01/23/19 1554 01/23/19 1912 01/23/19 1554 --   Temporal Monitor Lying Left arm       Pain Score       01/23/19 1554       6           Vitals:    01/23/19 1554 01/23/19 1912 01/23/19 2307 01/24/19 0719   BP: 109/53 134/58 108/55 99/58   Pulse: (!) 111 101 86 80   Patient Position - Orthostatic VS:  Lying Lying Lying       Visual Acuity      ED Medications  Medications   amLODIPine (NORVASC) tablet 10 mg (10 mg Oral Not Given 1/24/19 0859)   aspirin chewable tablet 81 mg (81 mg Oral Given 1/24/19 0859)   atorvastatin (LIPITOR) tablet 40 mg (40 mg Oral Not Given 1/23/19 2028)   atropine (ISOPTO ATROPINE) 1 % ophthalmic solution 1 drop (1 drop Right Eye Given 1/24/19 0900)   cholecalciferol (VITAMIN D3) tablet 1,000 Units (1,000 Units Oral Given 1/24/19 0859)   losartan (COZAAR) tablet 50 mg (50 mg Oral Not Given 1/24/19 0859)   niacin (NIASPAN) CR tablet 1,500 mg (1,500 mg Oral Given 1/23/19 2101)   prednisoLONE acetate (PRED FORTE) 1 % ophthalmic suspension 1 drop (1 drop Right Eye Given 1/24/19 0900)   timolol (TIMOPTIC) 0 5 % ophthalmic solution 1 drop (1 drop Left Eye Given 1/24/19 1009)   ondansetron (ZOFRAN) injection 4 mg (not administered)   ampicillin-sulbactam (UNASYN) 3 g in sodium chloride 0 9 % 100 mL IVPB (3 g Intravenous New Bag 1/24/19 0624)   vancomycin (VANCOCIN) 1,250 mg in sodium chloride 0 9 % 250 mL IVPB (not administered)   morphine injection 1 mg (1 mg Intravenous Given 1/24/19 0041)   ampicillin-sulbactam (UNASYN) 3 g in sodium chloride 0 9 % 100 mL IVPB (3 g Intravenous New Bag 1/23/19 8506)   vancomycin (VANCOCIN) IVPB (premix) 1,000 mg (1,000 mg Intravenous New Bag 1/23/19 1930)       Diagnostic Studies  Results Reviewed     Procedure Component Value Units Date/Time    Basic metabolic panel [[id number]]  (Abnormal) Collected:  01/23/19 1648    Lab Status:  Final result Specimen:  Blood from Arm, Left Updated:  01/23/19 1720     Sodium 134 mmol/L      Potassium 3 8 mmol/L      Chloride 98 mmol/L      CO2 26 mmol/L      ANION GAP 10 mmol/L      BUN 24 mg/dL      Creatinine 1 13 mg/dL      Glucose 114 (H) mg/dL      Calcium 8 7 mg/dL      eGFR 58 ml/min/1 73sq m     Narrative:         National Kidney Disease Education Program recommendations are as follows:  GFR calculation is accurate only with a steady state creatinine  Chronic Kidney disease less than 60 ml/min/1 73 sq  meters  Kidney failure less than 15 ml/min/1 73 sq  meters  Lactic acid, plasma [[id number]]  (Normal) Collected:  01/23/19 1648    Lab Status:  Final result Specimen:  Blood from Arm, Left Updated:  01/23/19 1720     LACTIC ACID 1 3 mmol/L     Narrative:         Result may be elevated if tourniquet was used during collection      Protime-INR [[id number]]  (Abnormal) Collected:  01/23/19 1648    Lab Status:  Final result Specimen:  Blood from Arm, Left Updated:  01/23/19 1717     Protime 47 7 (H) seconds      INR 4 05 (H)    APTT [[id number]]  (Abnormal) Collected:  01/23/19 1648    Lab Status:  Final result Specimen:  Blood from Arm, Left Updated:  01/23/19 1717     PTT 48 (H) seconds     CBC and differential [[id number]]  (Abnormal) Collected:  01/23/19 1648    Lab Status:  Final result Specimen:  Blood from Arm, Left Updated:  01/23/19 1701     WBC 12 40 (H) Thousand/uL      RBC 4 31 Million/uL      Hemoglobin 12 1 (L) g/dL      Hematocrit 37 9 %      MCV 88 fL      MCH 27 9 pg      MCHC 31 8 g/dL      RDW 15 6 (H) %      MPV 8 5 (L) fL      Platelets 092 Thousands/uL      nRBC 0 /100 WBCs      Neutrophils Relative 83 (H) %      Lymphocytes Relative 8 (L) %      Monocytes Relative 8 %      Eosinophils Relative 1 %      Basophils Relative 0 % Neutrophils Absolute 10 30 (H) Thousands/µL      Lymphocytes Absolute 1 00 Thousands/µL      Monocytes Absolute 1 00 Thousand/µL      Eosinophils Absolute 0 10 Thousand/µL      Basophils Absolute 0 00 Thousands/µL     Blood culture #1 [[id number]] Collected:  01/23/19 1648    Lab Status: In process Specimen:  Blood from Arm, Left Updated:  01/23/19 1657    Blood culture #2 [[id number]] Collected:  01/23/19 1648    Lab Status: In process Specimen:  Blood from Arm, Left Updated:  01/23/19 1657                 VAS lower limb venous duplex study, unilateral/limited   Final Result by Tay Loaiza DO (01/24 9024)      US extremity soft tissue   Final Result by Bo Skiff, MD (01/23 2215)      Complex 9 5 x 3 x 7 4 cm structure in the upper left inner thigh area of inflammation highly suspicious for abscess especially in the setting of superficial infection rather than hematoma or pseudoaneurysm  However, I cannot exclude a thrombosed    pseudoaneurysm or hematoma  Hematoma would be in the setting of trauma or recent surgery/biopsy  If there is concern for a feeding vessel into this structure causing thrombosed pseudoaneurysm, please request ultrasound with color cine Doppler flow for    definitive evaluation         Workstation performed: [id number]                    Procedures  ECG 12 Lead Documentation  Date/Time: 1/23/2019 6:09 PM  Performed by: Lane Bustos  Authorized by: Lane Bustos     ECG reviewed by me, the ED Provider: yes    Patient location:  ED  Comments:      Afib at 90, no acute-appearing ST-T wave changes, no PVCs           Phone Contacts  ED Phone Contact    ED Course  ED Course as of Jan 24 1241 Wed Jan 23, 2019   1805 Stable; no DVT, w/enlarged local lymph note (up to 5cm); admitted to Hospitalist                                MDM  Number of Diagnoses or Management Options  Diagnosis management comments: Initial Impression: left groin cellulitis, possible abscess/hematoma, no recent surgical procedures (within past month or so), on Coumadin, no fever; will screen with labs, start broad empiric antibiotics, and get screening US of affected area and LLE in general    CritCare Time    Disposition  Final diagnoses:   Cellulitis and abscess of left lower extremity   Lymphadenitis     Time reflects when diagnosis was documented in both MDM as applicable and the Disposition within this note     Time User Action Codes Description Comment    1/23/2019  6:04 PM Angelia Renee Add [C48 820,  L02 416] Cellulitis and abscess of left lower extremity     1/23/2019  6:04 PM Angelia Renee Add [I88 9] Lymphadenitis     1/24/2019 12:03 PM More Lawrence Add [C43 72] Malignant melanoma of left foot ([address] )     1/24/2019 12:03 PM More Lawrence Modify [C43 72] Malignant melanoma of left foot [facility])       ED Disposition     ED Disposition Condition Comment    Admit  Case was discussed with MLP for Dr Reynaldo Urrutia and the patient's admission status was agreed to be Admission Status: inpatient status to the service of Dr Reynaldo Urrutia           Follow-up Information    None         Current Discharge Medication List      CONTINUE these medications which have NOT CHANGED    Details   amLODIPine (NORVASC) 10 mg tablet 10 mg Daily      aspirin 81 MG tablet Take 81 mg by mouth daily      atorvastatin (LIPITOR) 40 mg tablet Take 40 mg by mouth daily      atropine (ISOPTO ATROPINE) 1 % ophthalmic solution Administer 1 drop to the right eye daily        losartan (COZAAR) 50 mg tablet Daily      niacin (NIASPAN) 750 MG CR tablet Take 1,500 mg by mouth daily at bedtime        prednisoLONE acetate (PRED FORTE) 1 % ophthalmic suspension Administer 1 drop to the right eye daily        timolol (TIMOPTIC) 0 5 % ophthalmic solution Administer 1 drop into the left eye 2 (two) times a day      warfarin (COUMADIN) 5 mg tablet Take 5 mg by mouth daily           STOP taking these medications       Cholecalciferol (VITAMIN D3) 1000 units CAPS Comments:   Reason for Stopping:             No discharge procedures on file      ED Provider  Electronically Signed by           Romario Liu MD  01/24/19 8825

## 2019-01-23 NOTE — ASSESSMENT & PLAN NOTE
· This is a chronic history  · However the patient does have an area on his left foot with necrosis verses eschar  · Consult oncology

## 2019-01-23 NOTE — ASSESSMENT & PLAN NOTE
· Chronic and stable  · Patient does take Coumadin  · INR is 4 05  · Will hold Coumadin for 01/23/2019  · Check INR in the morning

## 2019-01-23 NOTE — H&P
H&P- Steve Carrizales 3/5/1931, 80 y o  male MRN: [id number]    Unit/Bed#: -02 Encounter: [id number]    Primary Care Provider: Cheryl Blackman MD   Date and time admitted to hospital: 1/23/2019  3:57 PM        * Cellulitis   Assessment & Plan    · Continue both vancomycin and Unasyn     Groin pain   Assessment & Plan    · Monitor for pain     Malignant melanoma of left foot ([facility] [address] )   Assessment & Plan    · This is a chronic history  · However the patient does have an area on his left foot with necrosis verses eschar  · Consult oncology     Chronic atrial fibrillation ([facility] [address] )   Assessment & Plan    · Chronic and stable  · Patient does take Coumadin  · INR is 4 05  · Will hold Coumadin for 01/23/2019  · Check INR in the morning     Coagulopathy ([facility] [address] )   Assessment & Plan    · Will hold Coumadin for admission day  · Check INR in the morning     Essential hypertension   Assessment & Plan    · Chronic and stable  · Continue Norvasc     Hyperlipidemia   Assessment & Plan    · Chronic and stable  · Continue niacin     Glaucoma of right eye   Assessment & Plan    · Chronic and stable  · Continue eyedrops     Leukemoid reaction   Assessment & Plan    · Most likely secondary to cellulitis  · Continue antibiotics  · Monitor lab work           VTE Prophylaxis: Warfarin (Coumadin)  Code Status:  Full code  POLST: POLST is not applicable to this patient  Discussion with family:  Need for IV antibiotics    Anticipated Length of Stay:  Patient will be admitted on an Inpatient basis with an anticipated length of stay of  > 2 midnights  Justification for Hospital Stay:  Need for IV antibiotics, to be seen by Oncology    Total Time for Visit, including Counseling / Coordination of Care: 70   Greater than 50% of this total time spent on direct patient counseling and coordination of care      Chief Complaint:   Left groin swelling and redness    History of Present Illness:    Steve Carrizales is a 80 y o  male who presents with left groin swelling and redness  The area seems to be centered around a previous lymph node removal area  The area is warm to touch red and significantly larger than the right side  There is no involvement into his penis or testicles  Patient does have a history of malignant melanoma of his left foot in which she does have an area on his left foot with necrosis versus eschar  Patient states over the last few days he has noticed his left groin becoming more more swollen with redness  He denies any type of trauma fever chest pain or shortness of breath  Review of Systems:  Review of Systems   Constitutional: Negative  HENT: Negative  Eyes: Negative  Respiratory: Negative  Cardiovascular: Negative  Gastrointestinal: Negative  Endocrine: Negative  Genitourinary: Negative  Musculoskeletal: Negative  Skin: Positive for color change  Allergic/Immunologic: Negative  Neurological: Negative  Hematological: Negative  Psychiatric/Behavioral: Negative          Past Medical and Surgical History:   Past Medical History:   Diagnosis Date    Anesthesia     "blood pressure went down and than had a blood transfusion after hip replacement"    Anticoagulated on Coumadin     Arthritis     Bleeding risk due to Coumadin and aspirin     Blind right eye     Bruises easily     Bulging lumbar disc     Cancer (HCC)     LT foot/melanoma    Coronary artery disease     Foot pain     "nerve pains to left foot sometimes"    Glaucoma     right eye    History of coronary artery stent placement     x2 "in the 1990's"    History of left hip replacement     History of transfusion     "i think with left hip replacement about 8 yrs ago, think it was my own blood"    Hyperlipidemia     Hypertension     Irregular heart beat     afib    Left shoulder pain     Low back pain     Osteopenia     Risk for falls     S/P CABG x 1 1974    Seasonal allergies     Shortness of breath     Teeth missing     lower    Use of cane as ambulatory aid     Wears dentures     upper       Past Surgical History:   Procedure Laterality Date   [facility] CARDIAC SURGERY      3212 and [address]80'L    CATARACT EXTRACTION Bilateral     COLONOSCOPY      CORONARY ARTERY BYPASS GRAFT      CORONARY STENT PLACEMENT  1990's    pt has two coronary stents    JOINT REPLACEMENT Left     THR    LYMPH NODE BIOPSY Left 12/3/2018    Procedure: SENTINEL NODE BX;  Surgeon: Priscilla Michaels MD;  [facility];  Service: Surgical Oncology    SD ADJ TISS XFER SCALP,EXTREM <10 SQCM Left 12/3/2018    Procedure: LOCAL FLAP, SKIN GRAFT;  Surgeon: Sun Garcia MD;  [facility];  Service: Plastics    SD KNEE SCOPE,MED/LAT MENISECTOMY Left 8/15/2018    Procedure: left KNEE ARTHROSCOPY with medial & lateral menisectomy, chondroplasty,synovectomy & injection;  Surgeon: Bren Brooke DO;  Location: [address] [place] [address] MAIN OR;  Service: Orthopedics    SKIN BIOPSY      SKIN LESION EXCISION Left 12/3/2018    Procedure: LYMPHOSCINTIGRAPHY, INTRAOP LYMPHATIC MAPPING , SENT NODE BIOPSY, WIDE EXCISION MELANOMA SCAR LEFT LAT  DORSAL FOOT;  Surgeon: Priscilla Michaels MD;  [facility];  Service: Surgical Oncology    WISDOM TOOTH EXTRACTION         Meds/Allergies:  Prior to Admission medications    Medication Sig Start Date End Date Taking?  Authorizing Provider   amLODIPine (NORVASC) 10 mg tablet 10 mg Daily    Historical Provider, MD   aspirin 81 MG tablet Take 81 mg by mouth daily    Historical Provider, MD   atorvastatin (LIPITOR) 40 mg tablet Take 40 mg by mouth daily    Historical Provider, MD   atropine (ISOPTO ATROPINE) 1 % ophthalmic solution Administer 1 drop to the right eye daily      Historical Provider, MD   Cholecalciferol (VITAMIN D3) 1000 units CAPS Take 1,000 Units by mouth daily    Historical Provider, MD   furosemide (LASIX) 40 mg tablet Take 1 tablet (40 mg total) by mouth daily  Patient not taking: Reported on 12/21/2018 11/11/18 Cori Duval MD   losartan (COZAAR) 50 mg tablet Daily    Historical Provider, MD   niacin (NIASPAN) 750 MG CR tablet Take 1,500 mg by mouth daily at bedtime      Historical Provider, MD   potassium chloride (KLOR-CON) 20 mEq packet Take 20 mEq by mouth daily 11/11/18   Cori Duval MD   prednisoLONE acetate (PRED FORTE) 1 % ophthalmic suspension Administer 1 drop to the right eye daily      Historical Provider, MD   timolol (TIMOPTIC) 0 5 % ophthalmic solution Administer 1 drop into the left eye 2 (two) times a day    Historical Provider, MD   traMADol (ULTRAM) 50 mg tablet Take 1 tablet (50 mg total) by mouth every 6 (six) hours as needed for moderate pain  Patient not taking: Reported on 12/21/2018 11/2/18   Breanne Kwong MD   warfarin (COUMADIN) 5 mg tablet Take 5 mg by mouth daily      Historical Provider, MD     I have reviewed home medications with patient personally  Allergies: Allergies   Allergen Reactions    No Active Allergies        Social History:  Marital Status:    Occupation:  Retired  Patient Pre-hospital Living Situation:  At home  Patient Pre-hospital Level of Mobility:  Full  Patient Pre-hospital Diet Restrictions:  None  Substance Use History:     History   Alcohol Use    Yes     Comment: 1 wine weekly     History   Smoking Status    Former Smoker    Packs/day: 1 50    Quit date: 1974   Smokeless Tobacco    Never Used     History   Drug Use No       Family History:  I have reviewed the patients family history    Physical Exam:   Vitals:   Blood Pressure: 109/53 (01/23/19 1554)  Pulse: (!) 111 (01/23/19 1554)  Temperature: 97 6 °F (36 4 °C) (01/23/19 1554)  Temp Source: Temporal (01/23/19 1554)  Respirations: 16 (01/23/19 1554)  Height: 5' 7" (170 2 cm) (01/23/19 1554)  Weight - Scale: 77 1 kg (170 lb) (01/23/19 1554)  SpO2: 98 % (01/23/19 1554)    Physical Exam   Constitutional: He is oriented to person, place, and time   Vital signs are normal  He appears well-developed and well-nourished  He is cooperative  HENT:   Head: Normocephalic and atraumatic  Nose: Nose normal    Mouth/Throat: Mucous membranes are normal    Eyes: Pupils are equal, round, and reactive to light  Conjunctivae and EOM are normal    Neck: Normal range of motion and full passive range of motion without pain  Neck supple  Cardiovascular: Normal rate, regular rhythm, normal heart sounds and normal pulses  Pulmonary/Chest: Effort normal and breath sounds normal    Abdominal: Soft  Normal appearance and bowel sounds are normal    Musculoskeletal: Normal range of motion  Lymphadenopathy:        Left: Inguinal adenopathy present  Neurological: He is alert and oriented to person, place, and time  Skin:        Psychiatric: He has a normal mood and affect  His speech is normal and behavior is normal        Additional Data:   Lab Results: I have personally reviewed pertinent reports  Results from last 7 days  Lab Units 01/23/19  1648   WBC Thousand/uL 12 40*   HEMOGLOBIN g/dL 12 1*   HEMATOCRIT % 37 9   PLATELETS Thousands/uL 184   NEUTROS PCT % 83*   LYMPHS PCT % 8*   MONOS PCT % 8   EOS PCT % 1       Results from last 7 days  Lab Units 01/23/19  1648   POTASSIUM mmol/L 3 8   CHLORIDE mmol/L 98   CO2 mmol/L 26   BUN mg/dL 24   CREATININE mg/dL 1 13   CALCIUM mg/dL 8 7       Results from last 7 days  Lab Units 01/23/19  1648   INR  4 05*               Imaging: I have personally reviewed pertinent reports  VAS lower limb venous duplex study, unilateral/limited    (Results Pending)   US extremity soft tissue    (Results Pending)       EKG, Pathology, and Other Studies Reviewed on Admission:   · EKG:  Not applicable    NetAccess/Epic Records Reviewed: No     ** Please Note: This note has been constructed using a voice recognition system   **

## 2019-01-24 ENCOUNTER — ANESTHESIA (OUTPATIENT)
Dept: MEDSURG UNIT | Facility: HOSPITAL | Age: 84
End: 2019-01-24

## 2019-01-24 ENCOUNTER — ANESTHESIA EVENT (OUTPATIENT)
Dept: MEDSURG UNIT | Facility: HOSPITAL | Age: 84
End: 2019-01-24

## 2019-01-24 ENCOUNTER — APPOINTMENT (INPATIENT)
Dept: NON INVASIVE DIAGNOSTICS | Facility: HOSPITAL | Age: 84
DRG: 857 | End: 2019-01-24
Payer: MEDICARE

## 2019-01-24 LAB
ABO GROUP BLD: NORMAL
ALBUMIN SERPL BCP-MCNC: 3 G/DL (ref 3.5–5.7)
ALP SERPL-CCNC: 74 U/L (ref 55–165)
ALT SERPL W P-5'-P-CCNC: 47 U/L (ref 7–52)
ANION GAP SERPL CALCULATED.3IONS-SCNC: 10 MMOL/L (ref 4–13)
AST SERPL W P-5'-P-CCNC: 42 U/L (ref 13–39)
ATRIAL RATE: 357 BPM
BASOPHILS # BLD AUTO: 0 THOUSANDS/ΜL (ref 0–0.1)
BASOPHILS NFR BLD AUTO: 0 % (ref 0–2)
BILIRUB SERPL-MCNC: 0.6 MG/DL (ref 0.2–1)
BLD GP AB SCN SERPL QL: NEGATIVE
BUN SERPL-MCNC: 19 MG/DL (ref 7–25)
CALCIUM SERPL-MCNC: 8.3 MG/DL (ref 8.6–10.5)
CHLORIDE SERPL-SCNC: 103 MMOL/L (ref 98–107)
CO2 SERPL-SCNC: 25 MMOL/L (ref 21–31)
CREAT SERPL-MCNC: 1.1 MG/DL (ref 0.7–1.3)
EOSINOPHIL # BLD AUTO: 0.1 THOUSAND/ΜL (ref 0–0.61)
EOSINOPHIL NFR BLD AUTO: 1 % (ref 0–5)
ERYTHROCYTE [DISTWIDTH] IN BLOOD BY AUTOMATED COUNT: 15.4 % (ref 11.5–14.5)
GFR SERPL CREATININE-BSD FRML MDRD: 60 ML/MIN/1.73SQ M
GLUCOSE SERPL-MCNC: 77 MG/DL (ref 65–99)
HCT VFR BLD AUTO: 35.5 % (ref 36.5–49.3)
HGB BLD-MCNC: 11.3 G/DL (ref 14–18)
INR PPP: 3.62 (ref 0.9–1.5)
LYMPHOCYTES # BLD AUTO: 1.3 THOUSANDS/ΜL (ref 0.6–4.47)
LYMPHOCYTES NFR BLD AUTO: 12 % (ref 21–51)
MAGNESIUM SERPL-MCNC: 2 MG/DL (ref 1.9–2.7)
MCH RBC QN AUTO: 28.2 PG (ref 26–34)
MCHC RBC AUTO-ENTMCNC: 31.9 G/DL (ref 31–37)
MCV RBC AUTO: 89 FL (ref 81–99)
MONOCYTES # BLD AUTO: 0.8 THOUSAND/ΜL (ref 0.17–1.22)
MONOCYTES NFR BLD AUTO: 7 % (ref 2–12)
NEUTROPHILS # BLD AUTO: 9.1 THOUSANDS/ΜL (ref 1.4–6.5)
NEUTS SEG NFR BLD AUTO: 80 % (ref 42–75)
NRBC BLD AUTO-RTO: 0 /100 WBCS
PLATELET # BLD AUTO: 162 THOUSANDS/UL (ref 149–390)
PMV BLD AUTO: 8.6 FL (ref 8.6–11.7)
POTASSIUM SERPL-SCNC: 3.9 MMOL/L (ref 3.5–5.5)
PROCALCITONIN SERPL-MCNC: 0.47 NG/ML
PROT SERPL-MCNC: 6 G/DL (ref 6.4–8.9)
PROTHROMBIN TIME: 42.5 SECONDS (ref 10.2–13)
QRS AXIS: -21 DEGREES
QRSD INTERVAL: 90 MS
QT INTERVAL: 376 MS
QTC INTERVAL: 467 MS
RBC # BLD AUTO: 4 MILLION/UL (ref 4.3–5.9)
RH BLD: POSITIVE
SODIUM SERPL-SCNC: 138 MMOL/L (ref 134–143)
SPECIMEN EXPIRATION DATE: NORMAL
T WAVE AXIS: 14 DEGREES
VENTRICULAR RATE: 93 BPM
WBC # BLD AUTO: 11.3 THOUSAND/UL (ref 4.8–10.8)

## 2019-01-24 PROCEDURE — 99232 SBSQ HOSP IP/OBS MODERATE 35: CPT | Performed by: SURGERY

## 2019-01-24 PROCEDURE — 99232 SBSQ HOSP IP/OBS MODERATE 35: CPT | Performed by: INTERNAL MEDICINE

## 2019-01-24 PROCEDURE — 84145 PROCALCITONIN (PCT): CPT | Performed by: INTERNAL MEDICINE

## 2019-01-24 PROCEDURE — 80053 COMPREHEN METABOLIC PANEL: CPT | Performed by: NURSE PRACTITIONER

## 2019-01-24 PROCEDURE — 86901 BLOOD TYPING SEROLOGIC RH(D): CPT | Performed by: SURGERY

## 2019-01-24 PROCEDURE — 85610 PROTHROMBIN TIME: CPT | Performed by: NURSE PRACTITIONER

## 2019-01-24 PROCEDURE — 86900 BLOOD TYPING SEROLOGIC ABO: CPT | Performed by: SURGERY

## 2019-01-24 PROCEDURE — 93926 LOWER EXTREMITY STUDY: CPT | Performed by: SURGERY

## 2019-01-24 PROCEDURE — 86850 RBC ANTIBODY SCREEN: CPT | Performed by: SURGERY

## 2019-01-24 PROCEDURE — 93922 UPR/L XTREMITY ART 2 LEVELS: CPT | Performed by: SURGERY

## 2019-01-24 PROCEDURE — 93926 LOWER EXTREMITY STUDY: CPT

## 2019-01-24 PROCEDURE — 83735 ASSAY OF MAGNESIUM: CPT | Performed by: NURSE PRACTITIONER

## 2019-01-24 PROCEDURE — 93010 ELECTROCARDIOGRAM REPORT: CPT | Performed by: INTERNAL MEDICINE

## 2019-01-24 PROCEDURE — 30233K1 TRANSFUSION OF NONAUTOLOGOUS FROZEN PLASMA INTO PERIPHERAL VEIN, PERCUTANEOUS APPROACH: ICD-10-PCS | Performed by: INTERNAL MEDICINE

## 2019-01-24 PROCEDURE — P9017 PLASMA 1 DONOR FRZ W/IN 8 HR: HCPCS

## 2019-01-24 PROCEDURE — 85025 COMPLETE CBC W/AUTO DIFF WBC: CPT | Performed by: NURSE PRACTITIONER

## 2019-01-24 PROCEDURE — 93971 EXTREMITY STUDY: CPT | Performed by: SURGERY

## 2019-01-24 RX ORDER — PHYTONADIONE 10 MG/ML
10 INJECTION, EMULSION INTRAMUSCULAR; INTRAVENOUS; SUBCUTANEOUS ONCE
Status: COMPLETED | OUTPATIENT
Start: 2019-01-24 | End: 2019-01-24

## 2019-01-24 RX ADMIN — VITAMIN D, TAB 1000IU (100/BT) 1000 UNITS: 25 TAB at 08:59

## 2019-01-24 RX ADMIN — MORPHINE SULFATE 1 MG: 2 INJECTION, SOLUTION INTRAMUSCULAR; INTRAVENOUS at 23:07

## 2019-01-24 RX ADMIN — TIMOLOL MALEATE 1 DROP: 5 SOLUTION OPHTHALMIC at 10:09

## 2019-01-24 RX ADMIN — NIACIN 1500 MG: 500 TABLET, FILM COATED, EXTENDED RELEASE ORAL at 23:07

## 2019-01-24 RX ADMIN — AMPICILLIN SODIUM AND SULBACTAM SODIUM 3 G: 2; 1 INJECTION, POWDER, FOR SOLUTION INTRAMUSCULAR; INTRAVENOUS at 23:11

## 2019-01-24 RX ADMIN — SODIUM CHLORIDE 125 ML/HR: 9 INJECTION, SOLUTION INTRAVENOUS at 06:24

## 2019-01-24 RX ADMIN — AMPICILLIN SODIUM AND SULBACTAM SODIUM 3 G: 2; 1 INJECTION, POWDER, FOR SOLUTION INTRAMUSCULAR; INTRAVENOUS at 06:24

## 2019-01-24 RX ADMIN — ATROPINE SULFATE 1 DROP: 10 SOLUTION/ DROPS OPHTHALMIC at 09:00

## 2019-01-24 RX ADMIN — PREDNISOLONE ACETATE 1 DROP: 10 SUSPENSION/ DROPS OPHTHALMIC at 09:00

## 2019-01-24 RX ADMIN — AMPICILLIN SODIUM AND SULBACTAM SODIUM 3 G: 2; 1 INJECTION, POWDER, FOR SOLUTION INTRAMUSCULAR; INTRAVENOUS at 00:19

## 2019-01-24 RX ADMIN — VANCOMYCIN HYDROCHLORIDE 1250 MG: 1 INJECTION, POWDER, LYOPHILIZED, FOR SOLUTION INTRAVENOUS at 19:21

## 2019-01-24 RX ADMIN — TIMOLOL MALEATE 1 DROP: 5 SOLUTION OPHTHALMIC at 18:08

## 2019-01-24 RX ADMIN — AMPICILLIN SODIUM AND SULBACTAM SODIUM 3 G: 2; 1 INJECTION, POWDER, FOR SOLUTION INTRAMUSCULAR; INTRAVENOUS at 13:04

## 2019-01-24 RX ADMIN — AMPICILLIN SODIUM AND SULBACTAM SODIUM 3 G: 2; 1 INJECTION, POWDER, FOR SOLUTION INTRAMUSCULAR; INTRAVENOUS at 18:08

## 2019-01-24 RX ADMIN — MORPHINE SULFATE 1 MG: 2 INJECTION, SOLUTION INTRAMUSCULAR; INTRAVENOUS at 00:41

## 2019-01-24 RX ADMIN — PHYTONADIONE 10 MG: 10 INJECTION, EMULSION INTRAMUSCULAR; INTRAVENOUS; SUBCUTANEOUS at 16:16

## 2019-01-24 RX ADMIN — ASPIRIN 81 MG 81 MG: 81 TABLET ORAL at 08:59

## 2019-01-24 RX ADMIN — ATORVASTATIN CALCIUM 40 MG: 40 TABLET, FILM COATED ORAL at 16:17

## 2019-01-24 RX ADMIN — COLLAGENASE SANTYL: 250 OINTMENT TOPICAL at 16:16

## 2019-01-24 NOTE — PROGRESS NOTES
Progress Note - Camilla Sellers 3/5/1931, 80 y o  male MRN: [id number]    Unit/Bed#: -02 Encounter: [id number]    Primary Care Provider: Belle Canchola MD   Date and time admitted to hospital: 1/23/2019  3:57 PM        * Cellulitis   Assessment & Plan    · Ultrasound of left groin shows findings concerning for abscess  · Continue both vancomycin and Unasyn  · Follow up cultures  · Trend procalcitonin  · Pain control as needed  · Surgery consultation     Malignant melanoma of left foot ([facility] [address] )   Assessment & Plan    · This is a chronic history  · However the patient does have an area on his left foot with necrosis verses eschar  · Consult podiatry     Glaucoma of right eye   Assessment & Plan    · Chronic and stable  · Continue eyedrops     Chronic atrial fibrillation (HCC)   Assessment & Plan    · Chronic and stable  · Coumadin held due to supratherapeutic INR     Coagulopathy ([facility] [address] )   Assessment & Plan    · Will hold Coumadin for now  · Monitor INR     Hyperlipidemia   Assessment & Plan    · Chronic and stable  · Continue niacin     Essential hypertension   Assessment & Plan    · Chronic and stable  · Continue Norvasc       VTE Pharmacologic Prophylaxis: Pharmacologic: Warfarin (Coumadin)    Patient Centered Rounds: I have performed bedside rounds with nursing staff today  Discussions with Specialists or Other Care Team Provider: yes  Education and Discussions with Family / Patient: yes    Time Spent for Care: 30 minutes  More than 50% of total time spent on counseling and coordination of care as described above  Current Length of Stay: 1 day(s)    Current Patient Status: Inpatient   Certification Statement: The patient will continue to require additional inpatient hospital stay due to Groin abscess    Discharge Plan:  Pending hospital course    Code Status: Level 3 - DNAR and DNI    Subjective:   Patient with left groin pain  Denies any fever or chills  No nausea or vomiting    Tolerating diet     Objective:     Vitals:   Temp (24hrs), Av 7 °F (37 1 °C), Min:97 6 °F (36 4 °C), Max:99 6 °F (37 6 °C)    Temp:  [97 6 °F (36 4 °C)-99 6 °F (37 6 °C)] 99 2 °F (37 3 °C)  HR:  [] 80  Resp:  [16] 16  BP: ()/(53-58) 99/58  SpO2:  [96 %-98 %] 97 %  Body mass index is 26 63 kg/m²  Input and Output Summary (last 24 hours): Intake/Output Summary (Last 24 hours) at 19 1207  Last data filed at 19 0530   Gross per 24 hour   Intake              360 ml   Output              800 ml   Net             -440 ml       Physical Exam:     Physical Exam   Constitutional: No distress  Frail elderly male   HENT:   Head: Normocephalic and atraumatic  Eyes:   Right eye blindness   Neck: Normal range of motion  Neck supple  Cardiovascular: Normal rate and regular rhythm  Pulmonary/Chest: Effort normal  No respiratory distress  Abdominal: Soft  He exhibits no distension  There is no tenderness  Musculoskeletal: Normal range of motion  He exhibits no edema  Neurological: He is alert  No cranial nerve deficit  Skin:   Left foot with wound dressing in place  Also noted to have erythema and tenderness left groin with significant swelling   Psychiatric: He has a normal mood and affect  Additional Data:     Labs:      Results from last 7 days  Lab Units 19  0524   WBC Thousand/uL 11 30*   HEMOGLOBIN g/dL 11 3*   HEMATOCRIT % 35 5*   PLATELETS Thousands/uL 162   NEUTROS PCT % 80*   LYMPHS PCT % 12*   MONOS PCT % 7   EOS PCT % 1       Results from last 7 days  Lab Units 19  0524   POTASSIUM mmol/L 3 9   CHLORIDE mmol/L 103   CO2 mmol/L 25   BUN mg/dL 19   CREATININE mg/dL 1 10   CALCIUM mg/dL 8 3*   ALK PHOS U/L 74   ALT U/L 47   AST U/L 42*       Results from last 7 days  Lab Units 19  0524   INR  3 62*               * I Have Reviewed All Lab Data Listed Above  * Additional Pertinent Lab Tests Reviewed:  Jenni [facility] Admission Reviewed    Imaging:  Imaging Reports Reviewed Today Include:  Ultrasound report of the groin from yesterday reviewed    Recent Cultures (last 7 days):           Last 24 Hours Medication List:     Current Facility-Administered Medications:  amLODIPine 10 mg Oral Daily Jessica Savage, TRACE    ampicillin-sulbactam 3 g Intravenous Q6H TRACE Simmons Last Rate: 3 g (01/24/19 3369)   aspirin 81 mg Oral Daily Jessica Savage, KERONNP    atorvastatin 40 mg Oral Daily With Mediamorph, CRNP    atropine 1 drop Right Eye Daily Jessica A Hussein, TRACE    cholecalciferol 1,000 Units Oral Daily Jessica A Hussein, CRNP    losartan 50 mg Oral Daily Jessica Savage, TRACE    morphine injection 1 mg Intravenous Q4H PRN Sravan Maxwell PA-C    niacin 1,500 mg Oral HS Jessica Savage, TRACE    ondansetron 4 mg Intravenous Q6H PRN Jessica Savage, TRACE    prednisoLONE acetate 1 drop Right Eye Daily Jessica Savage, TRACE    timolol 1 drop Left Eye BID Jessica Savage, TRACE    vancomycin 15 mg/kg Intravenous Q24H TRACE Simmons         Today, Patient Was Seen By: Dora Ordonez MD    ** Please Note: Dictation voice to text software may have been used in the creation of this document   **

## 2019-01-24 NOTE — ANESTHESIA PREPROCEDURE EVALUATION
Review of Systems/Medical History    Chart reviewed      Cardiovascular  Hyperlipidemia, Hypertension , CAD , CHF ,   Comment: AFIB,  Pulmonary  Shortness of breath,        GI/Hepatic            Endo/Other     GYN       Hematology   Musculoskeletal    Comment: Left thigh infection and left foot/ankle infection Arthritis     Neurology    CVA ,    Psychology         Lab Results   Component Value Date    WBC 11 30 (H) 01/24/2019    HGB 11 3 (L) 01/24/2019    HCT 35 5 (L) 01/24/2019    MCV 89 01/24/2019     01/24/2019     Lab Results   Component Value Date    CALCIUM 8 3 (L) 01/24/2019    K 3 9 01/24/2019    CO2 25 01/24/2019     01/24/2019    BUN 19 01/24/2019    CREATININE 1 10 01/24/2019     Lab Results   Component Value Date    INR 3 62 (H) 01/24/2019    INR 4 05 (H) 01/23/2019    INR 2 59 (H) 01/14/2019    PROTIME 42 5 (H) 01/24/2019    PROTIME 47 7 (H) 01/23/2019    PROTIME 30 3 (H) 01/14/2019     Lab Results   Component Value Date    PTT 48 (H) 01/23/2019

## 2019-01-24 NOTE — ASSESSMENT & PLAN NOTE
· This is a chronic history  · However the patient does have an area on his left foot with necrosis verses eschar  · Consult podiatry

## 2019-01-24 NOTE — UTILIZATION REVIEW
Initial Clinical Review    Admission: Date/Time/Statement: 1/23/19 @ 1805       Orders Placed This Encounter   Procedures    Inpatient Admission (expected length of stay for this patient is greater than two midnights)     Standing Status:   Standing     Number of Occurrences:   1     Order Specific Question:   Admitting Physician     Answer:   Pohng Ashraf     Order Specific Question:   Level of Care     Answer:   Med Surg [16]     Order Specific Question:   Estimated length of stay     Answer:   More than 2 Midnights     Order Specific Question:   Certification     Answer:   I certify that inpatient services are medically necessary for this patient for a duration of greater than two midnights  See H&P and MD Progress Notes for additional information about the patient's course of treatment  ED: Date/Time/Mode of Arrival:   ED Arrival Information     Expected Arrival Acuity Means of Arrival Escorted By Service Admission Type    - 1/23/2019 15:22 Urgent Walk-In Self General Medicine Urgent    Arrival Complaint    groin pain and swelling        Chief Complaint:   Chief Complaint   Patient presents with    Groin Pain     left inguinal swelling  Patient had lymph nodes removed due to melanoma and states that area is swelling up again  History of Illness:    80year old female  Presents to ed for evaluation of left groin swelling and redness after previous lymph node removal   The area is warm to the toudh, red and significantly larger than right side            ED Vital Signs:   01/23/19 1554 01/23/19 1554 01/23/19 1554 01/23/19 1554 01/23/19 1554   97 6 °F (36 4 °C) (!) 111 16 109/53 98 %         Pain Score       6       01/23/19 77 1 kg (170 lb)     Vital Signs (abnormal):     Heart rate  111   101     Temp 99 6          Pertinent Labs/Diagnostic Test Results:    Wbc  12 40    INR 4 5   Blood culture x 2       Ultrasound     IMPRESSION:     Complex 9 5 x 3 x 7 4 cm structure in the upper left inner thigh area of inflammation highly suspicious for abscess especially in the setting of superficial infection rather than hematoma or pseudoaneurysm  However, I cannot exclude a thrombosed   pseudoaneurysm or hematoma  Hematoma would be in the setting of trauma or recent surgery/biopsy  If there is concern for a feeding vessel into this structure causing thrombosed pseudoaneurysm, please request ultrasound with color cine Doppler flow for definitive evaluation           ED Treatment:   Medication Administration from 01/23/2019 1522 to 01/23/2019 1830       Date/Time Order Dose Route     01/23/2019 1756 ampicillin-sulbactam (UNASYN) 3 g in sodium chloride 0 9 % 100 mL IVPB 3 g Intravenous       Past Medical/Surgical History:     Diagnosis    Malignant melanoma of left foot ([address] )    Blind right eye    Cerebrovascular accident ([facility] [address] )    Cholelithiasis without obstruction    Coronary arteriosclerosis in native artery    Essential hypertension    Hyperlipidemia    Idiopathic progressive polyneuropathy    Neovascular glaucoma of left eye    Osteoarthritis    Osteoporosis    Overweight    Vitamin D deficiency    Congestive heart failure (CHF) (HCC)    Coagulopathy (HCC)    Chronic atrial fibrillation (HCC)         Admitting Diagnosis:     Lymphadenitis [I88 9]  Groin pain [R10 30]  Cellulitis and abscess of left lower extremity [L03 116, L02 416]       Age/Sex: 80 y o  male       Assessment/Plan:      Cellulitis   Assessment & Plan     · Ultrasound of left groin shows findings concerning for abscess  · Continue both vancomycin and Unasyn  · Follow up cultures  · Trend procalcitonin  · Pain control as needed  · Surgery consultation           Malignant melanoma of left foot ([address] )   Assessment & Plan     · This is a chronic history  · However the patient does have an area on his left foot with necrosis verses eschar  · Consult oncology     Coagulopathy [facility])   Assessment & Plan     · Will hold Coumadin for admission day  · Check INR in the morning     Podiatry consult   Assessment:  Status post 7 weeks intermediate skin graft left foot  Failed skin graft, postsurgical wound  PVD     Plan:  Discussed with patient follow-up with current surgeon versus intervention while inpatient  Patient has elected for active intervention at this time  We proposed arterial Doppler to clarify blood flow to left foot as pedal pulses are currently nonpalpable    If blood flow is adequate patient would benefit from OR debridement of the necrotic wound site, application of wound VAC, and subsequent split-thickness skin graft pending viability of the wound base          Admission Orders:              amLODIPine 10 mg Oral Daily   ampicillin-sulbactam 3 g Intravenous Q6H   aspirin 81 mg Oral Daily   atorvastatin 40 mg Oral Daily With Dinner   atropine 1 drop Right Eye Daily   cholecalciferol 1,000 Units Oral Daily   collagenase  Topical Daily   losartan 50 mg Oral Daily   morphine injection 1 mg Intravenous Q4H PRN   niacin 1,500 mg Oral HS   ondansetron 4 mg Intravenous Q6H PRN   phytonadione 10 mg Subcutaneous Once   prednisoLONE acetate 1 drop Right Eye Daily   timolol 1 drop Left Eye BID   vancomycin 15 mg/kg Intravenous Q24H

## 2019-01-24 NOTE — PLAN OF CARE
Problem: DISCHARGE PLANNING - CARE MANAGEMENT  Goal: Discharge to post-acute care or home with appropriate resources  INTERVENTIONS:  - Conduct assessment to determine patient/family and health care team treatment goals, and need for post-acute services based on payer coverage, community resources, and patient preferences, and barriers to discharge  - Address psychosocial, clinical, and financial barriers to discharge as identified in assessment in conjunction with the patient/family and health care team  - Arrange appropriate level of post-acute services according to patient's   needs and preference and payer coverage in collaboration with the physician and health care team  - Communicate with and update the patient/family, physician, and health care team regarding progress on the discharge plan  - Arrange appropriate transportation to post-acute venues  - patients goal is to return home upon discharge   Outcome: Progressing

## 2019-01-24 NOTE — ASSESSMENT & PLAN NOTE
· Ultrasound of left groin shows findings concerning for abscess  · Continue both vancomycin and Unasyn  · Follow up cultures  · Trend procalcitonin  · Pain control as needed  · Surgery consultation

## 2019-01-24 NOTE — SOCIAL WORK
CM met with pt and son Aisha Garner at bedside and explained role  Pt lives alone in 1 story house; 1 [address]  Pt reports he occasionally uses a cane  He denies the use of any other DME  Pt prince shave walker at home if needed  Pt report she is completely independent with ADL's  He continues to drive and reports he goes to the gym 3 x per week  Pt has assistance from family as needed from son, daughter and sister  Pt PCP is Dr Riddhi Moe  He uses [facility] for medications  Pt denies any history of MH or D&A treatment  Pt denies any past or present [address] or SNF treatment  Pt denies the need for [address] at this time  PT/OT evals are pending  CM will continue to follow to arrange for any discharge needs  Pt indicates family will transport him home upon discharge  CM reviewed d/c planning process including the following: identifying help at home, patient preference for d/c planning needs, availability of treatment team to discuss questions or concerns patient and/or family may have regarding understanding medications and recognizing signs and symptoms once discharged  CM also encouraged patient to follow up with all recommended appointments after discharge  Patient advised of importance for patient and family to participate in managing patients medical well being

## 2019-01-24 NOTE — ANESTHESIA PREPROCEDURE EVALUATION
Review of Systems/Medical History    Chart reviewed      Cardiovascular  EKG reviewed, Hyperlipidemia, Hypertension , CAD , CHF ,   Comment: EKG Atrial fibrillation,  Pulmonary  Shortness of breath,        GI/Hepatic            Endo/Other     GYN       Hematology   Musculoskeletal    Arthritis     Neurology    CVA ,    Psychology         Lab Results   Component Value Date    WBC 11 30 (H) 01/24/2019    HGB 11 3 (L) 01/24/2019    HCT 35 5 (L) 01/24/2019    MCV 89 01/24/2019     01/24/2019     Lab Results   Component Value Date    CALCIUM 8 3 (L) 01/24/2019    K 3 9 01/24/2019    CO2 25 01/24/2019     01/24/2019    BUN 19 01/24/2019    CREATININE 1 10 01/24/2019     Lab Results   Component Value Date    INR 3 62 (H) 01/24/2019    INR 4 05 (H) 01/23/2019    INR 2 59 (H) 01/14/2019    PROTIME 42 5 (H) 01/24/2019    PROTIME 47 7 (H) 01/23/2019    PROTIME 30 3 (H) 01/14/2019     Lab Results   Component Value Date    PTT 48 (H) 01/23/2019

## 2019-01-24 NOTE — TELEPHONE ENCOUNTER
[address] [facility] oncologist (approximated)  CONFIDENTIALTY NOTICE: This fax transmission is intended only for the addressee  It contains information that is legally privileged,  confidential or otherwise protected from use or disclosure  If you are not the intended recipient, you are strictly prohibited from reviewing,  disclosing, copying using or disseminating any of this information or taking any action in reliance on or regarding this information  If you have  received this fax in error, please notify us immediately by telephone so that we can arrange for its return to us  Page: 1 of 2  Call Id: [id number]  Health Call  Standard Call Report  Health Call  Patient Name: Robby Barnett oncologist  Gender: Female  : (approximated)  Age:  Return Phone  Number: [phone] (Work)  Address:  City/State/Zip:  Practice Name: [facility]  Practice Charged:  Physician:  [address] [place] [address] Name:  Relationship To  Patient:  Return Phone Number: Unavailable  Presenting Problem: Routine/ [address] [place]/  [place]  Service Type: Consults  Charged Service 1: Consults  Pharmacy Name and  Number:  Nurse Assessment  Nurse: Joelle Colorado Date/Time: 2019 10:30:37 PM  Type of assessment required:  ---Consult  DateTime called Cy Sloan Name  ---2019 @ / Tonia Learn of appointment:  ---Routine Consult  Facility/Unit/Room Number/Unit Phone Number  ---[address] [place]/ Med Surg/ 103 Bed 2/   Practice consulted:  ---Oncology  Requesting physician:  ---Dr Oliver Kilpatrick  Patient's name//Medical Record Number  ---Niecy Pinto/ [id number]/ [id number]  Admitting diagnosis/Reason for consult  ---Cellulitis and Abscess of Lower Left Extremity  Physician Notified/DateTime:  ---Dr Miriam Heaton 2019 @   [facility] Medical oncologist (approximated)  CONFIDENTIALTY NOTICE: This fax transmission is intended only for the addressee   It contains information that is legally privileged,  confidential or otherwise protected from use or disclosure  If you are not the intended recipient, you are strictly prohibited from reviewing,  disclosing, copying using or disseminating any of this information or taking any action in reliance on or regarding this information  If you have  received this fax in error, please notify us immediately by telephone so that we can arrange for its return to us  Page: 2 of 2  Call Id: [id number]  Protocols  Protocol Title Nurse Date/Time  Disp   Time Disposition Final User  1/23/2019 10:50:24 PM Close Yes Corrie Hernandez

## 2019-01-24 NOTE — CONSULTS
Vancomycin IV Pharmacy-to-Dose Consultation    Jennifer Zacarias is a 80 y o  male who is currently receiving Vancomycin IV with management by the Pharmacy Consult service  Assessment/Plan:  The patient was reviewed  Renal function is stable and no signs or symptoms of nephrotoxicity and/or infusion reactions were documented in the chart  Based on todays assessment, continue current vancomycin (day # 2) dosing of 1250 mg IV q24, with a plan for trough to be drawn at 1900 on 1/26/19  We will continue to follow the patients culture results and clinical progress daily      Lizzy Pisano, Pharmacist

## 2019-01-24 NOTE — NURSING NOTE
Pt Admitted to Med surg room 103-2  Pt is A&Ox3 Vitals stable  Pt oriented to surrounding and call bell in reach  Pt on Cardiac diet and eating sandwich  Pt instructed to call bell for assistance if needed  Will continue to monitor and reassess

## 2019-01-24 NOTE — PROGRESS NOTES
Vancomycin Assessment    Elsi Coates is a 80 y o  male who is currently receiving vancomycin 1000mg iv q12h for skin-soft tissue infection     Relevant clinical data and objective history reviewed:  Creatinine   Date Value Ref Range Status   01/23/2019 1 13 0 70 - 1 30 mg/dL Final     Comment:     Standardized to IDMS reference method   11/21/2018 1 19 0 60 - 1 30 mg/dL Final     Comment:     Standardized to IDMS reference method   11/11/2018 1 19 0 70 - 1 30 mg/dL Final     Comment:     Standardized to IDMS reference method     /58 (BP Location: Left arm)   Pulse 101   Temp 98 5 °F (36 9 °C) (Tympanic)   Resp 16   Ht 5' 7" (1 702 m)   Wt 77 1 kg (170 lb)   SpO2 97%   BMI 26 63 kg/m²   No intake/output data recorded  Lab Results   Component Value Date/Time    BUN 24 01/23/2019 04:48 PM    WBC 12 40 (H) 01/23/2019 04:48 PM    WBC 6 9 06/18/2018 12:45 PM    HGB 12 1 (L) 01/23/2019 04:48 PM    HGB 14 1 06/18/2018 12:45 PM    HCT 37 9 01/23/2019 04:48 PM    HCT 41 8 (L) 06/18/2018 12:45 PM    MCV 88 01/23/2019 04:48 PM    MCV 89 5 06/18/2018 12:45 PM     01/23/2019 04:48 PM     06/18/2018 12:45 PM     Temp Readings from Last 3 Encounters:   01/23/19 98 5 °F (36 9 °C) (Tympanic)   12/21/18 97 5 °F (36 4 °C)   12/03/18 97 6 °F (36 4 °C)     Vancomycin Days of Therapy: 1    Assessment/Plan  The patient is currently on vancomycin utilizing scheduled dosing based on actual body weight  Baseline risks associated with therapy include: pre-existing renal impairment, concomitant nephrotoxic medications, advanced age and dehydration  The patient is currently receiving 1000mg iv q12h and after clinical evaluation will be changed to 1250mg iv q24h  Pharmacy will also follow closely for s/sx of nephrotoxicity, infusion reactions and appropriateness of therapy  BMP and CBC will be ordered per protocol    Plan for trough as patient approaches steady state, prior to the 4th  dose at approximately 1900 on 1/26/19  Due to infection severity, will target a trough of 10-15 (mild infection/cellulitis)   Pharmacy will continue to follow the patients culture results and clinical progress daily      Joe Enriquez, Pharmacist

## 2019-01-24 NOTE — CONSULTS
Consultation - Franklin Rao 80 y o  male MRN: [id number]  Unit/Bed#: -02 Encounter: [id number]      Assessment/Plan   Assessment:  Status post 7 weeks intermediate skin graft left foot  Failed skin graft, postsurgical wound  PVD    Plan:  Discussed with patient follow-up with current surgeon versus intervention while inpatient  Patient has elected for active intervention at this time  We proposed arterial Doppler to clarify blood flow to left foot as pedal pulses are currently nonpalpable  If blood flow is adequate patient would benefit from OR debridement of the necrotic wound site, application of wound VAC, and subsequent split-thickness skin graft pending viability of the wound base  Patient is in agreement with proposed treatment plan    History of Present Illness   Physician Requesting Consult: Tiburcio Hill MD  Reason for Consult / Principal Problem:  Left foot wound  HPI: Donavon Burkitt is a 80y o  year old male who presents with left foot failed intermediate skin graft status post wide excision of malignant melanoma  Patient had sentinel node biopsy as well and currently is concerned with the appearance of cellulitis into the left groin  He currently has a dry sterile dressing on the left lateral foot and relates minimal pain  Consults    Review of Systems   Skin: Positive for wound         Historical Information   Past Medical History:   Diagnosis Date    Anesthesia     "blood pressure went down and than had a blood transfusion after hip replacement"    Anticoagulated on Coumadin     Arthritis     Bleeding risk due to Coumadin and aspirin     Blind right eye     Bruises easily     Bulging lumbar disc     Cancer (HCC)     LT foot/melanoma    Coronary artery disease     Foot pain     "nerve pains to left foot sometimes"    Glaucoma     right eye    History of coronary artery stent placement     x2 "in the 1990's"    History of left hip replacement     History of transfusion     "i think with left hip replacement about 8 yrs ago, think it was my own blood"    Hyperlipidemia     Hypertension     Irregular heart beat     afib    Left shoulder pain     Low back pain     Osteopenia     Risk for falls     S/P CABG x 1 1974    Seasonal allergies     Shortness of breath     Teeth missing     lower    Use of cane as ambulatory aid     Wears dentures     upper     Past Surgical History:   Procedure Laterality Date   [facility]      7589 and 80472'I    CATARACT EXTRACTION Bilateral     COLONOSCOPY      CORONARY ARTERY BYPASS GRAFT      CORONARY STENT PLACEMENT  1990's    pt has two coronary stents    JOINT REPLACEMENT Left     THR    LYMPH NODE BIOPSY Left 12/3/2018    Procedure: SENTINEL NODE BX;  Surgeon: Parveen Shah MD;  [facility];  Service: Surgical Oncology    TN ADJ TISS XFER SCALP,EXTREM <10 SQCM Left 12/3/2018    Procedure: LOCAL FLAP, SKIN GRAFT;  Surgeon: Kandi Galan MD;  [facility];  Service: Plastics    TN KNEE SCOPE,MED/LAT MENISECTOMY Left 8/15/2018    Procedure: left KNEE ARTHROSCOPY with medial & lateral menisectomy, chondroplasty,synovectomy & injection;  Surgeon: Dolly Ceja DO;  Location: [address] [place] [address] MAIN OR;  Service: Orthopedics    SKIN BIOPSY      SKIN LESION EXCISION Left 12/3/2018    Procedure: LYMPHOSCINTIGRAPHY, INTRAOP LYMPHATIC MAPPING , SENT NODE BIOPSY, WIDE EXCISION MELANOMA SCAR LEFT LAT   DORSAL FOOT;  Surgeon: Parveen Shah MD;  [facility];  Service: Surgical Oncology    WISDOM TOOTH EXTRACTION       Social History   History   Alcohol Use    Yes     Comment: 1 wine weekly     History   Drug Use No     History   Smoking Status    Former Smoker    Packs/day: 1 50    Quit date: 1974   Smokeless Tobacco    Never Used     Family History: non-contributory    Meds/Allergies   all current active meds have been reviewed  Allergies   Allergen Reactions    No Active Allergies Objective   Vitals: Blood pressure 99/58, pulse 80, temperature 99 2 °F (37 3 °C), temperature source Tympanic, resp  rate 16, height 5' 7" (1 702 m), weight 77 1 kg (170 lb), SpO2 97 %  ,Body mass index is 26 63 kg/m²  Intake/Output Summary (Last 24 hours) at 01/24/19 1307  Last data filed at 01/24/19 0530   Gross per 24 hour   Intake              360 ml   Output              800 ml   Net             -440 ml     I/O last 24 hours: In: 360 [P O :360]  Out: 800 [Urine:800]    Invasive Devices     Peripheral Intravenous Line            Peripheral IV 01/23/19 Left less than 1 day                Physical Exam   Cardiovascular:   Pulses:       Dorsalis pedis pulses are 0 on the left side  Posterior tibial pulses are 0 on the left side  Skin: Lesion noted  Ortho Exam    Lab Results: I have personally reviewed pertinent lab results  Imaging Studies: I have personally reviewed pertinent reports  EKG, Pathology, and Other Studies: I have personally reviewed pertinent reports  VTE Prophylaxis: Reason for no pharmacologic prophylaxis per int medicine    Code Status: Level 3 - DNAR and DNI  Advance Directive and Living Will:      Power of :    POLST:      Counseling / Coordination of Care  Total floor / unit time spent today 60 minutes  Greater than 50% of total time was spent with the patient and / or family counseling and / or coordination of care  A description of the counseling / coordination of care: We discussed the plan of care at length to include surgical and nonsurgical options  We discussed follow-up with existing care team versus inpatient follow-up  Patient has decided for inpatient intervention for the left foot failed skin graft  We reviewed the need for arterial Doppler as pedal pulses are not evident  Pending results of arterial Doppler anticipated OR debridement of the left foot versus revascularization is planned    Once perfusion is available and wound base improves through use of negative wound pressure therapy anticipate split-thickness skin graft application for the left foot  Patient is in agreement with the proposed treatment plan  Currently we are applying Santyl dry sterile dressing to the left foot

## 2019-01-24 NOTE — CONSULTS
Consultation - General Surgery   Ronan Carter 80 y o  male MRN: [id number]  Unit/Bed#: -02 Encounter: [id number]    Assessment/Plan     Assessment:  The patient is a pleasant 80-year-old male status post definitive treatment of a left lower extremity melanoma presenting with a surgical site wound infection of the proximal anterior left thigh for which incision and drainage is now indicated under anesthesia  Plan:  1  Reversal of coagulopathy, verbal consent obtained from patient  2  I and D of proximal anterior right thigh abscess under sedation  3  Combined left foot wound debridement with podiatry  History of Present Illness     HPI:  Ronan Carter is a 80 y o  male who presents with anterior proximal left thigh abscess status post sentinel lymph node biopsy during the treatment of a left foot melanoma  Inpatient consult to Acute Care Surgery  Consult performed by: Gilbert Hernandez ordered by: Ronel Isbell          Review of Systems   Constitutional: Positive for activity change, appetite change, chills and fever  HENT: Negative  Eyes: Negative  Respiratory: Negative  Cardiovascular: Positive for leg swelling  Gastrointestinal: Negative  Endocrine: Negative  Genitourinary: Negative  Musculoskeletal: Positive for arthralgias  Skin:        The patient is status post excision of a left foot melanoma and full-thickness skin grafting as well as left proximal thigh sentinel lymph node biopsy in December 2018  He returns with a one-week history of increasing left groin pain and swelling  Allergic/Immunologic: Negative  Neurological: Negative  Hematological:        Patient systemically anticoagulated for his atrial fibrillation with Coumadin  Psychiatric/Behavioral: Negative          Historical Information   Past Medical History:   Diagnosis Date    Anesthesia     "blood pressure went down and than had a blood transfusion after hip replacement"  Anticoagulated on Coumadin     Arthritis     Bleeding risk due to Coumadin and aspirin     Blind right eye     Bruises easily     Bulging lumbar disc     Cancer (HCC)     LT foot/melanoma    Coronary artery disease     Foot pain     "nerve pains to left foot sometimes"    Glaucoma     right eye    History of coronary artery stent placement     x2 "in the 1990's"    History of left hip replacement     History of transfusion     "i think with left hip replacement about 8 yrs ago, think it was my own blood"    Hyperlipidemia     Hypertension     Irregular heart beat     afib    Left shoulder pain     Low back pain     Osteopenia     Risk for falls     S/P CABG x 1 1974    Seasonal allergies     Shortness of breath     Teeth missing     lower    Use of cane as ambulatory aid     Wears dentures     upper     Past Surgical History:   Procedure Laterality Date   [facility] CARDIAC SURGERY      3916 and [address]'V    CATARACT EXTRACTION Bilateral     COLONOSCOPY      CORONARY ARTERY BYPASS GRAFT      CORONARY STENT PLACEMENT  1990's    pt has two coronary stents    JOINT REPLACEMENT Left     THR    LYMPH NODE BIOPSY Left 12/3/2018    Procedure: SENTINEL NODE BX;  Surgeon: Darrell Fuller MD;  [facility];  Service: Surgical Oncology    OK ADJ TISS XFER SCALP,EXTREM <10 SQCM Left 12/3/2018    Procedure: LOCAL FLAP, SKIN GRAFT;  Surgeon: Radha Parrish MD;  [facility];  Service: Plastics    OK KNEE SCOPE,MED/LAT MENISECTOMY Left 8/15/2018    Procedure: left KNEE ARTHROSCOPY with medial & lateral menisectomy, chondroplasty,synovectomy & injection;  Surgeon: Cherry Barnett DO;  Location: [address] [place] [address] MAIN OR;  Service: Orthopedics    SKIN BIOPSY      SKIN LESION EXCISION Left 12/3/2018    Procedure: LYMPHOSCINTIGRAPHY, INTRAOP LYMPHATIC MAPPING , SENT NODE BIOPSY, WIDE EXCISION MELANOMA SCAR LEFT LAT   DORSAL FOOT;  Surgeon: Darrell Fuller MD;  [facility];  Service: Surgical Oncology  WISDOM TOOTH EXTRACTION       Social History   History   Alcohol Use    Yes     Comment: 1 wine weekly     History   Drug Use No     History   Smoking Status    Former Smoker    Packs/day: 1 50    Quit date: 1974   Smokeless Tobacco    Never Used     Family History: non-contributory    Meds/Allergies   all current active meds have been reviewed  Allergies   Allergen Reactions    No Active Allergies        Objective   First Vitals:   Blood Pressure: 109/53 (01/23/19 1554)  Pulse: (!) 111 (01/23/19 1554)  Temperature: 97 6 °F (36 4 °C) (01/23/19 1554)  Temp Source: Temporal (01/23/19 1554)  Respirations: 16 (01/23/19 1554)  Height: 5' 7" (170 2 cm) (01/23/19 1554)  Weight - Scale: 77 1 kg (170 lb) (01/23/19 1554)  SpO2: 98 % (01/23/19 1554)    Current Vitals:   Blood Pressure: 99/58 (01/24/19 0719)  Pulse: 80 (01/24/19 0719)  Temperature: 99 2 °F (37 3 °C) (01/24/19 0719)  Temp Source: Tympanic (01/24/19 0719)  Respirations: 16 (01/24/19 0719)  Height: 5' 7" (170 2 cm) (01/23/19 1554)  Weight - Scale: 77 1 kg (170 lb) (01/23/19 1554)  SpO2: 97 % (01/24/19 0719)      Intake/Output Summary (Last 24 hours) at 01/24/19 1517  Last data filed at 01/24/19 1300   Gross per 24 hour   Intake              840 ml   Output             1300 ml   Net             -460 ml       Invasive Devices     Peripheral Intravenous Line            Peripheral IV 01/23/19 Left less than 1 day                Physical Exam   Constitutional: He is oriented to person, place, and time  He appears well-developed and well-nourished  HENT:   Head: Normocephalic and atraumatic  Eyes: Pupils are equal, round, and reactive to light  Conjunctivae are normal    Neck: Normal range of motion  Neck supple  Cardiovascular: Normal rate and regular rhythm  Pulmonary/Chest: Effort normal and breath sounds normal    Abdominal: Soft  Bowel sounds are normal    Musculoskeletal: Normal range of motion     Patient has an obvious soft tissue infection involving the anterior proximal left thigh  Neurological: He is alert and oriented to person, place, and time  Skin: Skin is warm and dry  Psychiatric: His behavior is normal  Judgment and thought content normal    Nursing note and vitals reviewed  Lab Results: I have personally reviewed pertinent lab results  Imaging: I have personally reviewed pertinent reports  EKG, Pathology, and Other Studies: I have personally reviewed pertinent reports  Counseling / Coordination of Care  Total floor / unit time spent today 40 minutes  Greater than 50% of total time was spent with the patient and / or family counseling and / or coordination of care  A description of the counseling / coordination of care: 20

## 2019-01-25 ENCOUNTER — ANESTHESIA (INPATIENT)
Dept: PERIOP | Facility: HOSPITAL | Age: 84
DRG: 857 | End: 2019-01-25
Payer: MEDICARE

## 2019-01-25 ENCOUNTER — ANESTHESIA EVENT (INPATIENT)
Dept: PERIOP | Facility: HOSPITAL | Age: 84
DRG: 857 | End: 2019-01-25
Payer: MEDICARE

## 2019-01-25 DIAGNOSIS — Z71.89 COMPLEX CARE COORDINATION: Primary | ICD-10-CM

## 2019-01-25 PROBLEM — L02.214 ABSCESS OF LEFT GROIN: Status: ACTIVE | Noted: 2019-01-23

## 2019-01-25 LAB
ANION GAP SERPL CALCULATED.3IONS-SCNC: 11 MMOL/L (ref 4–13)
BASOPHILS # BLD AUTO: 0 THOUSANDS/ΜL (ref 0–0.1)
BASOPHILS # BLD AUTO: 0 THOUSANDS/ΜL (ref 0–0.1)
BASOPHILS NFR BLD AUTO: 0 % (ref 0–2)
BASOPHILS NFR BLD AUTO: 0 % (ref 0–2)
BUN SERPL-MCNC: 11 MG/DL (ref 7–25)
CALCIUM SERPL-MCNC: 8.3 MG/DL (ref 8.6–10.5)
CHLORIDE SERPL-SCNC: 102 MMOL/L (ref 98–107)
CO2 SERPL-SCNC: 27 MMOL/L (ref 21–31)
CREAT SERPL-MCNC: 0.82 MG/DL (ref 0.7–1.3)
EOSINOPHIL # BLD AUTO: 0.3 THOUSAND/ΜL (ref 0–0.61)
EOSINOPHIL # BLD AUTO: 0.3 THOUSAND/ΜL (ref 0–0.61)
EOSINOPHIL NFR BLD AUTO: 4 % (ref 0–5)
EOSINOPHIL NFR BLD AUTO: 4 % (ref 0–5)
ERYTHROCYTE [DISTWIDTH] IN BLOOD BY AUTOMATED COUNT: 14.8 % (ref 11.5–14.5)
ERYTHROCYTE [DISTWIDTH] IN BLOOD BY AUTOMATED COUNT: 15.2 % (ref 11.5–14.5)
GFR SERPL CREATININE-BSD FRML MDRD: 80 ML/MIN/1.73SQ M
GLUCOSE SERPL-MCNC: 87 MG/DL (ref 65–99)
HCT VFR BLD AUTO: 26.9 % (ref 36.5–49.3)
HCT VFR BLD AUTO: 32 % (ref 36.5–49.3)
HGB BLD-MCNC: 10.9 G/DL (ref 14–18)
HGB BLD-MCNC: 8.8 G/DL (ref 14–18)
INR PPP: 1.31 (ref 0.9–1.5)
INR PPP: 1.4 (ref 0.9–1.5)
LYMPHOCYTES # BLD AUTO: 1 THOUSANDS/ΜL (ref 0.6–4.47)
LYMPHOCYTES # BLD AUTO: 1.1 THOUSANDS/ΜL (ref 0.6–4.47)
LYMPHOCYTES NFR BLD AUTO: 13 % (ref 21–51)
LYMPHOCYTES NFR BLD AUTO: 15 % (ref 21–51)
MAGNESIUM SERPL-MCNC: 1.5 MG/DL (ref 1.9–2.7)
MCH RBC QN AUTO: 28.7 PG (ref 26–34)
MCH RBC QN AUTO: 29.6 PG (ref 26–34)
MCHC RBC AUTO-ENTMCNC: 32.6 G/DL (ref 31–37)
MCHC RBC AUTO-ENTMCNC: 33.9 G/DL (ref 31–37)
MCV RBC AUTO: 87 FL (ref 81–99)
MCV RBC AUTO: 88 FL (ref 81–99)
MONOCYTES # BLD AUTO: 0.5 THOUSAND/ΜL (ref 0.17–1.22)
MONOCYTES # BLD AUTO: 0.6 THOUSAND/ΜL (ref 0.17–1.22)
MONOCYTES NFR BLD AUTO: 7 % (ref 2–12)
MONOCYTES NFR BLD AUTO: 8 % (ref 2–12)
NEUTROPHILS # BLD AUTO: 5.3 THOUSANDS/ΜL (ref 1.4–6.5)
NEUTROPHILS # BLD AUTO: 6 THOUSANDS/ΜL (ref 1.4–6.5)
NEUTS SEG NFR BLD AUTO: 72 % (ref 42–75)
NEUTS SEG NFR BLD AUTO: 77 % (ref 42–75)
NRBC BLD AUTO-RTO: 0 /100 WBCS
NRBC BLD AUTO-RTO: 0 /100 WBCS
PLATELET # BLD AUTO: 154 THOUSANDS/UL (ref 149–390)
PLATELET # BLD AUTO: 165 THOUSANDS/UL (ref 149–390)
PMV BLD AUTO: 7.8 FL (ref 8.6–11.7)
PMV BLD AUTO: 7.8 FL (ref 8.6–11.7)
POTASSIUM SERPL-SCNC: 3.3 MMOL/L (ref 3.5–5.5)
PROCALCITONIN SERPL-MCNC: 0.24 NG/ML
PROTHROMBIN TIME: 15.2 SECONDS (ref 10.2–13)
PROTHROMBIN TIME: 16.3 SECONDS (ref 10.2–13)
RBC # BLD AUTO: 3.06 MILLION/UL (ref 4.3–5.9)
RBC # BLD AUTO: 3.66 MILLION/UL (ref 4.3–5.9)
SODIUM SERPL-SCNC: 140 MMOL/L (ref 134–143)
WBC # BLD AUTO: 7.3 THOUSAND/UL (ref 4.8–10.8)
WBC # BLD AUTO: 7.9 THOUSAND/UL (ref 4.8–10.8)

## 2019-01-25 PROCEDURE — 0J9M0ZZ DRAINAGE OF LEFT UPPER LEG SUBCUTANEOUS TISSUE AND FASCIA, OPEN APPROACH: ICD-10-PCS | Performed by: SURGERY

## 2019-01-25 PROCEDURE — 85610 PROTHROMBIN TIME: CPT | Performed by: SURGERY

## 2019-01-25 PROCEDURE — 80048 BASIC METABOLIC PNL TOTAL CA: CPT | Performed by: SURGERY

## 2019-01-25 PROCEDURE — 87075 CULTR BACTERIA EXCEPT BLOOD: CPT | Performed by: SURGERY

## 2019-01-25 PROCEDURE — 87186 SC STD MICRODIL/AGAR DIL: CPT | Performed by: SURGERY

## 2019-01-25 PROCEDURE — 87205 SMEAR GRAM STAIN: CPT | Performed by: SURGERY

## 2019-01-25 PROCEDURE — 85025 COMPLETE CBC W/AUTO DIFF WBC: CPT | Performed by: SURGERY

## 2019-01-25 PROCEDURE — 83735 ASSAY OF MAGNESIUM: CPT | Performed by: SURGERY

## 2019-01-25 PROCEDURE — 10061 I&D ABSCESS COMP/MULTIPLE: CPT | Performed by: SURGERY

## 2019-01-25 PROCEDURE — 88304 TISSUE EXAM BY PATHOLOGIST: CPT | Performed by: PATHOLOGY

## 2019-01-25 PROCEDURE — 87147 CULTURE TYPE IMMUNOLOGIC: CPT | Performed by: SURGERY

## 2019-01-25 PROCEDURE — 99232 SBSQ HOSP IP/OBS MODERATE 35: CPT | Performed by: INTERNAL MEDICINE

## 2019-01-25 PROCEDURE — 84145 PROCALCITONIN (PCT): CPT | Performed by: INTERNAL MEDICINE

## 2019-01-25 PROCEDURE — 87070 CULTURE OTHR SPECIMN AEROBIC: CPT | Performed by: SURGERY

## 2019-01-25 PROCEDURE — 0JBR0ZZ EXCISION OF LEFT FOOT SUBCUTANEOUS TISSUE AND FASCIA, OPEN APPROACH: ICD-10-PCS | Performed by: INTERNAL MEDICINE

## 2019-01-25 RX ORDER — OXYCODONE HYDROCHLORIDE 5 MG/1
5 TABLET ORAL EVERY 4 HOURS PRN
Status: DISCONTINUED | OUTPATIENT
Start: 2019-01-25 | End: 2019-01-31 | Stop reason: HOSPADM

## 2019-01-25 RX ORDER — ONDANSETRON 2 MG/ML
4 INJECTION INTRAMUSCULAR; INTRAVENOUS ONCE AS NEEDED
Status: DISCONTINUED | OUTPATIENT
Start: 2019-01-25 | End: 2019-01-25 | Stop reason: HOSPADM

## 2019-01-25 RX ORDER — LIDOCAINE HYDROCHLORIDE 10 MG/ML
INJECTION, SOLUTION EPIDURAL; INFILTRATION; INTRACAUDAL; PERINEURAL AS NEEDED
Status: DISCONTINUED | OUTPATIENT
Start: 2019-01-25 | End: 2019-01-25 | Stop reason: HOSPADM

## 2019-01-25 RX ORDER — ACETAMINOPHEN 325 MG/1
650 TABLET ORAL EVERY 4 HOURS PRN
Status: DISCONTINUED | OUTPATIENT
Start: 2019-01-25 | End: 2019-01-31 | Stop reason: HOSPADM

## 2019-01-25 RX ORDER — CEFAZOLIN SODIUM 2 G/50ML
SOLUTION INTRAVENOUS AS NEEDED
Status: DISCONTINUED | OUTPATIENT
Start: 2019-01-25 | End: 2019-01-25 | Stop reason: SURG

## 2019-01-25 RX ORDER — PROPOFOL 10 MG/ML
INJECTION, EMULSION INTRAVENOUS AS NEEDED
Status: DISCONTINUED | OUTPATIENT
Start: 2019-01-25 | End: 2019-01-25 | Stop reason: SURG

## 2019-01-25 RX ORDER — MAGNESIUM HYDROXIDE 1200 MG/15ML
LIQUID ORAL AS NEEDED
Status: DISCONTINUED | OUTPATIENT
Start: 2019-01-25 | End: 2019-01-25 | Stop reason: HOSPADM

## 2019-01-25 RX ORDER — MAGNESIUM SULFATE 1 G/100ML
1 INJECTION INTRAVENOUS ONCE
Status: COMPLETED | OUTPATIENT
Start: 2019-01-25 | End: 2019-01-26

## 2019-01-25 RX ORDER — MORPHINE SULFATE 4 MG/ML
2 INJECTION, SOLUTION INTRAMUSCULAR; INTRAVENOUS
Status: DISCONTINUED | OUTPATIENT
Start: 2019-01-25 | End: 2019-01-25 | Stop reason: HOSPADM

## 2019-01-25 RX ORDER — LIDOCAINE HYDROCHLORIDE AND EPINEPHRINE 10; 10 MG/ML; UG/ML
INJECTION, SOLUTION INFILTRATION; PERINEURAL AS NEEDED
Status: DISCONTINUED | OUTPATIENT
Start: 2019-01-25 | End: 2019-01-25 | Stop reason: HOSPADM

## 2019-01-25 RX ORDER — SODIUM CHLORIDE, SODIUM LACTATE, POTASSIUM CHLORIDE, CALCIUM CHLORIDE 600; 310; 30; 20 MG/100ML; MG/100ML; MG/100ML; MG/100ML
INJECTION, SOLUTION INTRAVENOUS CONTINUOUS PRN
Status: DISCONTINUED | OUTPATIENT
Start: 2019-01-25 | End: 2019-01-25 | Stop reason: SURG

## 2019-01-25 RX ORDER — METOCLOPRAMIDE HYDROCHLORIDE 5 MG/ML
10 INJECTION INTRAMUSCULAR; INTRAVENOUS ONCE AS NEEDED
Status: DISCONTINUED | OUTPATIENT
Start: 2019-01-25 | End: 2019-01-25 | Stop reason: HOSPADM

## 2019-01-25 RX ORDER — SODIUM CHLORIDE, SODIUM LACTATE, POTASSIUM CHLORIDE, CALCIUM CHLORIDE 600; 310; 30; 20 MG/100ML; MG/100ML; MG/100ML; MG/100ML
100 INJECTION, SOLUTION INTRAVENOUS CONTINUOUS
Status: DISCONTINUED | OUTPATIENT
Start: 2019-01-25 | End: 2019-01-27

## 2019-01-25 RX ORDER — POTASSIUM CHLORIDE 14.9 MG/ML
20 INJECTION INTRAVENOUS ONCE
Status: DISCONTINUED | OUTPATIENT
Start: 2019-01-25 | End: 2019-01-31 | Stop reason: HOSPADM

## 2019-01-25 RX ORDER — PHYTONADIONE 10 MG/ML
10 INJECTION, EMULSION INTRAMUSCULAR; INTRAVENOUS; SUBCUTANEOUS ONCE
Status: COMPLETED | OUTPATIENT
Start: 2019-01-25 | End: 2019-01-25

## 2019-01-25 RX ORDER — HYDROMORPHONE HCL/PF 1 MG/ML
0.5 SYRINGE (ML) INJECTION
Status: DISCONTINUED | OUTPATIENT
Start: 2019-01-25 | End: 2019-01-25 | Stop reason: HOSPADM

## 2019-01-25 RX ORDER — OXYCODONE HYDROCHLORIDE AND ACETAMINOPHEN 5; 325 MG/1; MG/1
2 TABLET ORAL EVERY 4 HOURS PRN
Status: DISCONTINUED | OUTPATIENT
Start: 2019-01-25 | End: 2019-01-25

## 2019-01-25 RX ORDER — MEPERIDINE HYDROCHLORIDE 50 MG/ML
12.5 INJECTION INTRAMUSCULAR; INTRAVENOUS; SUBCUTANEOUS
Status: DISCONTINUED | OUTPATIENT
Start: 2019-01-25 | End: 2019-01-25 | Stop reason: HOSPADM

## 2019-01-25 RX ORDER — BUPIVACAINE HYDROCHLORIDE 5 MG/ML
INJECTION, SOLUTION PERINEURAL AS NEEDED
Status: DISCONTINUED | OUTPATIENT
Start: 2019-01-25 | End: 2019-01-25 | Stop reason: HOSPADM

## 2019-01-25 RX ORDER — FENTANYL CITRATE 50 UG/ML
INJECTION, SOLUTION INTRAMUSCULAR; INTRAVENOUS AS NEEDED
Status: DISCONTINUED | OUTPATIENT
Start: 2019-01-25 | End: 2019-01-25 | Stop reason: SURG

## 2019-01-25 RX ADMIN — ATORVASTATIN CALCIUM 40 MG: 40 TABLET, FILM COATED ORAL at 16:58

## 2019-01-25 RX ADMIN — TIMOLOL MALEATE 1 DROP: 5 SOLUTION OPHTHALMIC at 08:37

## 2019-01-25 RX ADMIN — LOSARTAN POTASSIUM 50 MG: 50 TABLET, FILM COATED ORAL at 08:36

## 2019-01-25 RX ADMIN — MAGNESIUM SULFATE HEPTAHYDRATE 1 G: 1 INJECTION, SOLUTION INTRAVENOUS at 08:35

## 2019-01-25 RX ADMIN — SODIUM CHLORIDE, SODIUM LACTATE, POTASSIUM CHLORIDE, AND CALCIUM CHLORIDE: .6; .31; .03; .02 INJECTION, SOLUTION INTRAVENOUS at 10:34

## 2019-01-25 RX ADMIN — MORPHINE SULFATE 1 MG: 2 INJECTION, SOLUTION INTRAMUSCULAR; INTRAVENOUS at 02:51

## 2019-01-25 RX ADMIN — CEFAZOLIN SODIUM 2000 MG: 2 SOLUTION INTRAVENOUS at 10:43

## 2019-01-25 RX ADMIN — MORPHINE SULFATE 1 MG: 2 INJECTION, SOLUTION INTRAMUSCULAR; INTRAVENOUS at 17:46

## 2019-01-25 RX ADMIN — ASPIRIN 81 MG 81 MG: 81 TABLET ORAL at 08:36

## 2019-01-25 RX ADMIN — AMPICILLIN SODIUM AND SULBACTAM SODIUM 3 G: 2; 1 INJECTION, POWDER, FOR SOLUTION INTRAMUSCULAR; INTRAVENOUS at 05:01

## 2019-01-25 RX ADMIN — VITAMIN D, TAB 1000IU (100/BT) 1000 UNITS: 25 TAB at 08:36

## 2019-01-25 RX ADMIN — PREDNISOLONE ACETATE 1 DROP: 10 SUSPENSION/ DROPS OPHTHALMIC at 08:36

## 2019-01-25 RX ADMIN — ATROPINE SULFATE 1 DROP: 10 SOLUTION/ DROPS OPHTHALMIC at 08:36

## 2019-01-25 RX ADMIN — PROPOFOL 120 MG: 10 INJECTION, EMULSION INTRAVENOUS at 10:36

## 2019-01-25 RX ADMIN — PHYTONADIONE 10 MG: 10 INJECTION, EMULSION INTRAMUSCULAR; INTRAVENOUS; SUBCUTANEOUS at 16:58

## 2019-01-25 RX ADMIN — AMLODIPINE BESYLATE 10 MG: 5 TABLET ORAL at 08:35

## 2019-01-25 RX ADMIN — VANCOMYCIN HYDROCHLORIDE 1250 MG: 1 INJECTION, POWDER, LYOPHILIZED, FOR SOLUTION INTRAVENOUS at 20:33

## 2019-01-25 RX ADMIN — FENTANYL CITRATE 25 MCG: 50 INJECTION INTRAMUSCULAR; INTRAVENOUS at 10:36

## 2019-01-25 RX ADMIN — MORPHINE SULFATE 1 MG: 2 INJECTION, SOLUTION INTRAMUSCULAR; INTRAVENOUS at 14:38

## 2019-01-25 RX ADMIN — HYDROMORPHONE HYDROCHLORIDE 0.5 MG: 1 INJECTION, SOLUTION INTRAMUSCULAR; INTRAVENOUS; SUBCUTANEOUS at 11:38

## 2019-01-25 RX ADMIN — LIDOCAINE HYDROCHLORIDE 100 MG: 20 INJECTION, SOLUTION INTRAVENOUS at 10:36

## 2019-01-25 RX ADMIN — AMPICILLIN SODIUM AND SULBACTAM SODIUM 3 G: 2; 1 INJECTION, POWDER, FOR SOLUTION INTRAMUSCULAR; INTRAVENOUS at 17:52

## 2019-01-25 RX ADMIN — FENTANYL CITRATE 25 MCG: 50 INJECTION INTRAMUSCULAR; INTRAVENOUS at 10:38

## 2019-01-25 NOTE — PROGRESS NOTES
The patient was reassessed at the request of nursing after his wound VAC was found to be alarming  On physical examination the wound VAC was occluded secondary to ongoing oozing from his coagulopathy  The wound VAC dressing and sponge was removed  After a thorough assessment the decision made to abandon the wound VAC until his coagulopathy was further normalized with the administration of vitamin K and additional units of FFP  Kerlix packing was applied the wound dressed and an aide was instructed to applied direct pressure for 30 minutes to help facilitate clotting  Orders are entered into the written record

## 2019-01-25 NOTE — ASSESSMENT & PLAN NOTE
· Chronic and stable  · Coumadin held due to anticipated surgical intervention  · Patient received 2 units of FFP and vitamin K  · INR level is now 1 4

## 2019-01-25 NOTE — PHYSICAL THERAPY NOTE
Holding PT eval today due to complications with wound and requiring pressure to the area  Will attempt again tomorrow   Cesar Moreno, PT

## 2019-01-25 NOTE — OP NOTE
OPERATIVE REPORT  PATIENT NAME: Donavon Burkitt    :  3/5/1931  MRN: [id number]  Pt Location: [address] [place] [address] OR ROOM 03    SURGERY DATE: 2019    Surgeon(s) and Role:     * Neil Amaya MD - Primary     * Cory Cancino, DPM - Co-surgeon    Preop Diagnosis:  * No pre-op diagnosis entered *    Post-Op Diagnosis Codes:     * Wound infection after surgery [[id number] [id number]]    Procedure(s) (LRB):  INCISION AND DRAINAGE (I&D) EXTREMITY (Left)  EXCISIONAL DEBRIDEMENT (Left)    Specimen(s):  ID Type Source Tests Collected by Time Destination   1 : left foot debrided tissue Tissue Foot, Left TISSUE Jeff Rave Neil Amaya MD 2019 1122    A : left thigh abcess Wound Leg, Left WOUND CULTURE Neil Amaya MD 2019 1116    B : left thigh abcess Wound Leg, Left ANAEROBIC CULTURE AND Piper Bellamy MD 2019 1117        Estimated Blood Loss:   Minimal    Drains:  Negative Pressure Wound Therapy (V A C ) Leg Anterior (Active)   Target Pressure (mmHg) 125 2019 12:05 PM   Dressing Status Clean;Dry; Intact 2019 12:05 PM   Number of days: 0       Negative Pressure Wound Therapy (V A C ) Foot Other (Comment) (Active)   Target Pressure (mmHg) 125 2019 12:05 PM   Dressing Status Clean;Dry; Intact 2019 12:05 PM   Number of days: 0       Anesthesia Type: Mac with local anesthesia    Operative Indications:  * No pre-op diagnosis entered *  Necrotic full-thickness wound left lateral foot  Failed skin graft    Operative Findings:  Complications:   None    Procedure and Technique:  Patient is an 49-year-old male presenting to [address] for intervention of his left foot  He was evaluated yesterday for his left foot wound with failed skin graft status post excision of malignant melanoma  He has been hospitalized for cellulitis and lymphadenitis status post sentinel lymph node biopsy    He is scheduled for incision and drainage of the abscess for this left inguinal lymph node abscess as well  We have coordinated with General surgery to debride the necrotic left foot wound and apply wound VAC to generate increased granular base  He has completed arterial Doppler with adequate perfusion noted for this left foot  Gross findings and procedure  This 77-year-old male was identified and placed on the OR table in a supine position  After administration of anesthesia local anesthesia was then administered to the patient's left foot  Patient's left foot was then prepped and draped in the usual sterile fashion with use of Betadine scrub solution  Complete excision of the necrotic base was performed  Post debridement the wound remains measured at 5 x 5 cm in diameter with approximately 1 5 x 1 2 cm skin island  Adequate bleeding taking place hemostasis achieved with direct pressure  Wound VAC applied at 125 mm Hg black foam with no leaks  Patient tolerated procedure and anesthesia well without complications  We will follow him while he is inpatient with anticipated wound VAC change 3 times per week  He is anticipated for follow-up in the [address] [facility] for his left foot wound with plan of care to include application and harvest of split-thickness skin graft once healthy granular base develops  We may also consider serial application of Apligraf depending on progress of the wound     I was present for the entire procedure    Patient Disposition:  PACU     SIGNATURE: Hao Butler DPM  DATE: January 25, 2019  TIME: 1:28 PM

## 2019-01-25 NOTE — ANESTHESIA PREPROCEDURE EVALUATION
Review of Systems/Medical History  Patient summary reviewed  Chart reviewed      Cardiovascular  EKG reviewed, Hyperlipidemia, Hypertension , CAD , CHF ,   Comment: AFIB,  Pulmonary  Shortness of breath,        GI/Hepatic            Endo/Other     GYN       Hematology   Musculoskeletal    Comment: Left thigh infection and left foot/ankle infection Arthritis     Neurology    CVA ,    Psychology         Lab Results   Component Value Date    WBC 7 90 01/25/2019    HGB 10 9 (L) 01/25/2019    HCT 32 0 (L) 01/25/2019    MCV 87 01/25/2019     01/25/2019     Lab Results   Component Value Date    CALCIUM 8 3 (L) 01/25/2019    K 3 3 (L) 01/25/2019    CO2 27 01/25/2019     01/25/2019    BUN 11 01/25/2019    CREATININE 0 82 01/25/2019     Lab Results   Component Value Date    INR 1 40 01/25/2019    INR 3 62 (H) 01/24/2019    INR 4 05 (H) 01/23/2019    PROTIME 16 3 (H) 01/25/2019    PROTIME 42 5 (H) 01/24/2019    PROTIME 47 7 (H) 01/23/2019     Lab Results   Component Value Date    PTT 48 (H) 01/23/2019       Physical Exam    Airway    Mallampati score: III  TM Distance: >3 FB  Neck ROM: full     Dental   upper dentures,     Cardiovascular  Rhythm: irregular, Cardiovascular exam normal    Pulmonary  Pulmonary exam normal     Other Findings        Anesthesia Plan  ASA Score- 3     Anesthesia Type- general with ASA Monitors  Additional Monitors:   Airway Plan: LMA  Comment: I personally discussed risks and benefits to this anesthetic  All patient questions were answered  Pt agrees with anesthesia plan        Plan Factors-    Induction- intravenous  Postoperative Plan- Plan for postoperative opioid use  Planned trial extubation    Informed Consent- Anesthetic plan and risks discussed with patient

## 2019-01-25 NOTE — PROGRESS NOTES
01/24/19 2025   Charting Type   Charting Type Shift assessment   Neurological   Neuro (WDL) WDL   HEENT   HEENT (WDL) X   R Eye Mildly impaired vision   L Eye Mildly impaired vision   Vision - Corrective Lenses (at bedside) Glasses   Teeth Missing teeth;Dentures upper   Respiratory   Respiratory (WDL) WDL   Cardiac   Cardiac (WDL) WDL   Pain Assessment   Pain Assessment No/denies pain   Pain Score No Pain   Clinical Progression Gradually improving   Peripheral Vascular   Peripheral Vascular (WDL) X   Cyanosis None   RUE Edema None   LUE Edema None   RLE Edema None  (Swelling L Groin)   LLE Edema None   Integumentary   Integumentary (WDL) X   Skin Color Red; Other (Comment)  (Reddened L Groin, L Foot Melanoma)   Skin Condition/Temp Cool;Dry   Skin Integrity Redness  (L Groin)   Skin Location L groin   Skin Turgor Non-tenting   Tattoos/Piercings   Does patient have tattoos? No   Piercings Remaining No   Delgado Scale   Sensory Perceptions 4   Moisture 4   Activity 3   Mobility 3   Nutrition 3   Friction and Shear 3   Delgado Scale Score 20   Wound 11/09/18 Other (Comment) Foot Left Cancerous lesion to L foot    Date First Assessed/Time First Assessed: 11/09/18 0030   Pre-Existing Wound: Yes  Traumatic Wound Type: (c) Other (Comment)  Location: Foot  Wound Location Orientation: Left  Wound Description (Comments): Cancerous lesion to L foot   Dressing Status:     Wound Description NANCI   Lia-wound Assessment Erythema   Dressing Non adherent;Gauze   Dressing Status Clean;Dry; Intact   Musculoskeletal   Musculoskeletal (WDL) WDL   Gastrointestinal   Gastrointestinal (WDL) X   Last BM Date 01/22/19   Genitourinary   Genitourinary (WDL) WDL   Urine Assessment   Urinary Incontinence No   Anal/Rectal   Anal/Rectal (WDL) WDL   Psychosocial   Psychosocial (WDL) WDL

## 2019-01-25 NOTE — SOCIAL WORK
Case discussed at interdisciplinary meeting  Patient for OR today  Will need to reassess for services

## 2019-01-25 NOTE — OP NOTE
OPERATIVE REPORT  PATIENT NAME: Brenda Patiño    :  3/5/1931  MRN: [id number]  Pt Location: [facility] OR ROOM 03    SURGERY DATE: 2019    Surgeon(s) and Role:     * Thomas Robledo MD - Primary     * Prema Mittal DPM - Co-surgeon    Preop Diagnosis:  Postoperative anterior proximal left thigh abscess status post sentinel lymph node biopsy    Post-Op Diagnosis Codes:     * Wound infection after surgery [[id number] [id number]]  Postoperative anterior proximal left thigh abscess status post sentinel lymph node biopsy    Procedure(s) (LRB):  INCISION AND DRAINAGE (I&D) EXTREMITY (Left)  EXCISIONAL DEBRIDEMENT (Left)   Incision and drainage of proximal anterior left thigh infected hematoma  Application of wound VAC to complex open left thigh wound    Specimen(s):  ID Type Source Tests Collected by Time Destination   A : left thigh abcess Wound Leg, Left WOUND CULTURE Thomas Robledo MD 2019 1116    B : left thigh abcess Wound Leg, Left ANAEROBIC CULTURE AND GRAM STAIN Thomas Robledo MD 2019 1117        Estimated Blood Loss:   Minimal    Drains:       Anesthesia Type:   General endotracheal anesthesia    Operative Indications: The patient is a pleasant 42-year-old male status post resection of a left foot melanoma with full-thickness skin grafting and sentinel lymph node biopsy from the proximal anterior left thigh who has developed a postoperative wound infection of the anterior left thigh for which id is now indicated  Concomitantly Dr Salguero of podiatry is recommended surgical debridement of the eschar over the full-thickness skin graft on the lateral left foot  The 2 procedures will be performed concomitantly  Operative Findings:  After performing time-out and I&D was performed through the original proximal thigh incision  Medially the large amount of pus and old infected hematoma drained spontaneously from the wound  Cultures were obtained for aerobic and anaerobic sensitivities      The wound digitally explored  All loculations freed  The incision extended both medially and laterally to facilitate ease of wound care postoperatively  Pulse lavage irrigation applied after which the wound was measured at 10 cm x 4 5 cm x 4 cm in depth  Large black foam cut to fit and applied  Dressings applied suction applied with good seal and no leak  The patient tolerated the procedure well  Complications:   None    Procedure and Technique:  The patient was taken to the operating room where they are properly identified, monitored and anesthetized  The received antibiotics perioperatively  Skin prepped and draped  Time-out performed  Skin infiltrated with 1% lidocaine local anesthesia with epinephrine  Skin incised through the old incision  Pus immediately drained and cultures obtained  The wound digitally explored all loculations freed  Skin incision extended both laterally and medially  Pulse lavage irrigation applied  The wound measured  Black foam from the wound VAC cut to fit and placed  The Bioclusive dressing applied  The wound VAC connected to the machine and initiated  Good seal was achieved and the procedure completed uneventfully       I was present for the entire procedure    Patient Disposition:  PACU     SIGNATURE: Marybeth Napier MD  DATE: January 25, 2019  TIME: 11:21 AM

## 2019-01-25 NOTE — ANESTHESIA POSTPROCEDURE EVALUATION
Post-Op Assessment Note      CV Status:  Stable    Mental Status:  Alert and awake    Hydration Status:  Euvolemic    PONV Controlled:  Controlled    Airway Patency:  Patent    Post Op Vitals Reviewed: Yes          Staff: Anesthesiologist           /67   Temp   99 8   Pulse 80   Resp 20   SpO2 97

## 2019-01-25 NOTE — ASSESSMENT & PLAN NOTE
· This is a chronic history  · However the patient does have an area on his left foot with necrosis verses eschar  · Podiatry input appreciated  · Podiatry recommends surgical debridement and possible wound VAC in the OR today

## 2019-01-25 NOTE — PROGRESS NOTES
Vancomycin IV Pharmacy-to-Dose Consultation    Danita Baxter is a 80 y o  male who is currently receiving Vancomycin IV with management by the Pharmacy Consult service  Assessment/Plan:  The patient was reviewed  Renal function is stable and no signs or symptoms of nephrotoxicity and/or infusion reactions were documented in the chart  Based on todays assessment, continue current vancomycin (day # 3) dosing of 1250mg IV q24 hours, with a plan for trough to be drawn at 1900 on 1/26/2019  We will continue to follow the patients culture results and clinical progress daily      Lola Hartmann, Pharmacist

## 2019-01-25 NOTE — OCCUPATIONAL THERAPY NOTE
OT Eval orders received/noted  Chart Reviewed  Pt N/A earlier this date r/t to OR  Will attempt eval at a later time    Reno Arguello, OT

## 2019-01-25 NOTE — PROGRESS NOTES
Progress Note - Camilla Sellers 3/5/1931, 80 y o  male MRN: [id number]    Unit/Bed#: -02 Encounter: [id number]    Primary Care Provider: Belle Canchola MD   Date and time admitted to hospital: 1/23/2019  3:57 PM        * Abscess of left groin   Assessment & Plan    · With surrounding cellulitis  · Ultrasound of left groin shows findings concerning for abscess  · Continue both vancomycin and Unasyn  · Follow up cultures  · Trend procalcitonin  · Pain control as needed  · Surgery recommends OR for I&D which will be done today     Malignant melanoma of left foot ([address] )   Assessment & Plan    · This is a chronic history  · However the patient does have an area on his left foot with necrosis verses eschar  · Podiatry input appreciated  · Podiatry recommends surgical debridement and possible wound VAC in the OR today  Glaucoma of right eye   Assessment & Plan    · Chronic and stable  · Continue eyedrops     Chronic atrial fibrillation (HCC)   Assessment & Plan    · Chronic and stable  · Coumadin held due to anticipated surgical intervention  · Patient received 2 units of FFP and vitamin K  · INR level is now 1 4     Essential hypertension   Assessment & Plan    · Chronic and stable  · Continue Norvasc       VTE Pharmacologic Prophylaxis: Pharmacologic:  Held due to surgical procedure today    Patient Centered Rounds: I have performed bedside rounds with nursing staff today  Discussions with Specialists or Other Care Team Provider: yes  Education and Discussions with Family / Patient: yes    Time Spent for Care: 30 minutes  More than 50% of total time spent on counseling and coordination of care as described above      Current Length of Stay: 2 day(s)    Current Patient Status: Inpatient   Certification Statement: The patient will continue to require additional inpatient hospital stay due to Left groin abscess    Discharge Plan:  Pending hospital course    Code Status: Level 3 - DNAR and DNI    Subjective:   Still intermittent pain of left groin area  T-max of 100 3° overnight  Denies any nausea vomiting  Plan for surgical intervention of left groin abscess and left foot wound  Objective:     Vitals:   Temp (24hrs), Av 4 °F (37 4 °C), Min:98 9 °F (37 2 °C), Max:100 3 °F (37 9 °C)    Temp:  [98 9 °F (37 2 °C)-100 3 °F (37 9 °C)] 100 1 °F (37 8 °C)  HR:  [63-98] 77  Resp:  [18-20] 18  BP: (111-141)/(59-74) 119/60  SpO2:  [90 %-91 %] 90 %  Body mass index is 26 63 kg/m²  Input and Output Summary (last 24 hours): Intake/Output Summary (Last 24 hours) at 19 0829  Last data filed at 19 0701   Gross per 24 hour   Intake              720 ml   Output              900 ml   Net             -180 ml       Physical Exam:     Physical Exam   Constitutional: He appears well-developed  No distress  HENT:   Head: Normocephalic and atraumatic  Eyes: EOM are normal    Right eye blindness   Neck: Normal range of motion  Neck supple  Cardiovascular: Normal rate and regular rhythm  Pulmonary/Chest: Effort normal  No respiratory distress  Abdominal: Soft  He exhibits no distension  There is no tenderness  Musculoskeletal: Normal range of motion  Neurological: He is alert  No cranial nerve deficit  Skin: Skin is warm and dry  Erythema and swelling noted in left groin  Tender to palpation  Also with foot wound on left foot with dressing in place   Psychiatric: He has a normal mood and affect         Additional Data:     Labs:      Results from last 7 days  Lab Units 19  0548   WBC Thousand/uL 7 90   HEMOGLOBIN g/dL 10 9*   HEMATOCRIT % 32 0*   PLATELETS Thousands/uL 154   NEUTROS PCT % 77*   LYMPHS PCT % 13*   MONOS PCT % 7   EOS PCT % 4       Results from last 7 days  Lab Units 19  0548 19  0524   POTASSIUM mmol/L 3 3* 3 9   CHLORIDE mmol/L 102 103   CO2 mmol/L 27 25   BUN mg/dL 11 19   CREATININE mg/dL 0 82 1 10   CALCIUM mg/dL 8 3* 8 3*   ALK PHOS U/L --  74   ALT U/L  --  47   AST U/L  --  42*       Results from last 7 days  Lab Units 01/25/19  0548   INR  1 40               * I Have Reviewed All Lab Data Listed Above  * Additional Pertinent Lab Tests Reviewed: [address] Admission  Reviewed    Imaging:  Imaging Reports Reviewed Today Include:  No new imaging    Recent Cultures (last 7 days):       Results from last 7 days  Lab Units 01/23/19  1648   BLOOD CULTURE  No Growth at 24 hrs  No Growth at 24 hrs  Last 24 Hours Medication List:     Current Facility-Administered Medications:  amLODIPine 10 mg Oral Daily Jessicajanice Savage, CRNP    ampicillin-sulbactam 3 g Intravenous Q6H Jessica A Hussein, CRNP Last Rate: 3 g (01/25/19 0501)   aspirin 81 mg Oral Daily Jessicajanice Savage, CRNP    atorvastatin 40 mg Oral Daily With [employer] CRNP    atropine 1 drop Right Eye Daily Jessicajanice Savage, CRNP    cholecalciferol 1,000 Units Oral Daily Jessica A Hussein, CRNP    collagenase  Topical Daily Araceli Ivy, DPM    losartan 50 mg Oral Daily Jessica A Hussein, CRNP    magnesium sulfate 1 g Intravenous Once Ramón Hernandez MD    morphine injection 1 mg Intravenous Q4H PRN Aury Cleaning PA-C    niacin 1,500 mg Oral HS Jessica A Hussein, KERONNP    ondansetron 4 mg Intravenous Q6H PRN Jessica Savage, CRNP    potassium chloride 20 mEq Intravenous Once Ramón Hernandez MD    prednisoLONE acetate 1 drop Right Eye Daily Jessica Savage, CRNP    timolol 1 drop Left Eye BID Jessicajanice Savage, CRNP    vancomycin 15 mg/kg Intravenous Q24H Jessica Savage, CRNP Last Rate: 1,250 mg (01/24/19 1921)        Today, Patient Was Seen By: Ramón Hernandez MD    ** Please Note: Dictation voice to text software may have been used in the creation of this document   **

## 2019-01-25 NOTE — NURSING NOTE
Patient wound vac to L groin noted to be bleeding and patient gown and bed linen saturated, : Tammy Villalta informed and in to see patient, wound vac removed, wound packed with britni 4"x 75", covered with 4x4, abd and secured with tape, pressure applied for 30 min as per MD order  Bleeding seemed to have stopped about an hour later at 4 pm dressing was saturated with blood again, pressured applied as charge nurse contacted Vivien Villalta verbal orders obtained to repack wound, cover with 4x4, abd pad and secure with tape, apply pressure for 30 mins Vital taken at 3:55Pm T 98 5, Pulse 82, resp 18, /58, wound was repacked with britni 4" x 75:, covered with 4x4, abd pad and secured with tape, pressure applied for 30 mins, dressing CDI  Vit K adm  Patient denied any dizziness, no s o b, lightheaded, palpations, numbness or weakness  At 5:30 PM Aide informed RN that patients dressing was bleeding again, Rn assessed dressing to L groin, dressing and bed sheet was saturated with bright red blood, vitals obtained Temp 100 2, pulse 85, resp 20 Bp 110/52 O2 92% RA, pressured applied, DrJudy Bagley contacted and orders were to repack wound, cover with 4x4, abd pad and secure with tape apply pressure for 30 min, continue this procedure if dressing becomes saturated again and place order for CBC, PT & INR after infusion of FFP completed and to notify MD of results  Wound vac to L lateral foot beeping with msg of blockage, : Kristine Ochoa called and verbal order obtained to change wound vac dressing to L foot  Dressing changed  1st infusion of FFP infused at 18:30

## 2019-01-25 NOTE — ASSESSMENT & PLAN NOTE
· With surrounding cellulitis  · Ultrasound of left groin shows findings concerning for abscess  · Continue both vancomycin and Unasyn  · Follow up cultures  · Trend procalcitonin  · Pain control as needed  · Surgery recommends OR for I&D which will be done today

## 2019-01-26 PROBLEM — D62 ACUTE BLOOD LOSS ANEMIA: Status: ACTIVE | Noted: 2019-01-26

## 2019-01-26 LAB
ANION GAP SERPL CALCULATED.3IONS-SCNC: 9 MMOL/L (ref 4–13)
BASOPHILS # BLD AUTO: 0 THOUSANDS/ΜL (ref 0–0.1)
BASOPHILS NFR BLD AUTO: 0 % (ref 0–2)
BUN SERPL-MCNC: 13 MG/DL (ref 7–25)
CALCIUM SERPL-MCNC: 8 MG/DL (ref 8.6–10.5)
CHLORIDE SERPL-SCNC: 102 MMOL/L (ref 98–107)
CO2 SERPL-SCNC: 27 MMOL/L (ref 21–31)
CREAT SERPL-MCNC: 0.83 MG/DL (ref 0.7–1.3)
EOSINOPHIL # BLD AUTO: 0.4 THOUSAND/ΜL (ref 0–0.61)
EOSINOPHIL NFR BLD AUTO: 5 % (ref 0–5)
ERYTHROCYTE [DISTWIDTH] IN BLOOD BY AUTOMATED COUNT: 14.9 % (ref 11.5–14.5)
GFR SERPL CREATININE-BSD FRML MDRD: 79 ML/MIN/1.73SQ M
GLUCOSE SERPL-MCNC: 84 MG/DL (ref 65–99)
HCT VFR BLD AUTO: 26.2 % (ref 36.5–49.3)
HGB BLD-MCNC: 8.7 G/DL (ref 14–18)
INR PPP: 1.26 (ref 0.9–1.5)
LYMPHOCYTES # BLD AUTO: 1.4 THOUSANDS/ΜL (ref 0.6–4.47)
LYMPHOCYTES NFR BLD AUTO: 20 % (ref 21–51)
MAGNESIUM SERPL-MCNC: 1.6 MG/DL (ref 1.9–2.7)
MCH RBC QN AUTO: 29.1 PG (ref 26–34)
MCHC RBC AUTO-ENTMCNC: 33.2 G/DL (ref 31–37)
MCV RBC AUTO: 88 FL (ref 81–99)
MONOCYTES # BLD AUTO: 0.5 THOUSAND/ΜL (ref 0.17–1.22)
MONOCYTES NFR BLD AUTO: 7 % (ref 2–12)
NEUTROPHILS # BLD AUTO: 4.9 THOUSANDS/ΜL (ref 1.4–6.5)
NEUTS SEG NFR BLD AUTO: 68 % (ref 42–75)
NRBC BLD AUTO-RTO: 0 /100 WBCS
PLATELET # BLD AUTO: 198 THOUSANDS/UL (ref 149–390)
PMV BLD AUTO: 8.1 FL (ref 8.6–11.7)
POTASSIUM SERPL-SCNC: 3.5 MMOL/L (ref 3.5–5.5)
PROTHROMBIN TIME: 14.7 SECONDS (ref 10.2–13)
RBC # BLD AUTO: 2.99 MILLION/UL (ref 4.3–5.9)
SODIUM SERPL-SCNC: 138 MMOL/L (ref 134–143)
VANCOMYCIN TROUGH SERPL-MCNC: 5.1 UG/ML (ref 5–12)
WBC # BLD AUTO: 7.3 THOUSAND/UL (ref 4.8–10.8)

## 2019-01-26 PROCEDURE — 80048 BASIC METABOLIC PNL TOTAL CA: CPT | Performed by: INTERNAL MEDICINE

## 2019-01-26 PROCEDURE — 99232 SBSQ HOSP IP/OBS MODERATE 35: CPT | Performed by: INTERNAL MEDICINE

## 2019-01-26 PROCEDURE — 99024 POSTOP FOLLOW-UP VISIT: CPT | Performed by: PHYSICIAN ASSISTANT

## 2019-01-26 PROCEDURE — 80202 ASSAY OF VANCOMYCIN: CPT | Performed by: NURSE PRACTITIONER

## 2019-01-26 PROCEDURE — 85610 PROTHROMBIN TIME: CPT | Performed by: SURGERY

## 2019-01-26 PROCEDURE — 83735 ASSAY OF MAGNESIUM: CPT | Performed by: INTERNAL MEDICINE

## 2019-01-26 PROCEDURE — 85025 COMPLETE CBC W/AUTO DIFF WBC: CPT | Performed by: INTERNAL MEDICINE

## 2019-01-26 RX ORDER — POTASSIUM CHLORIDE 20 MEQ/1
20 TABLET, EXTENDED RELEASE ORAL ONCE
Status: COMPLETED | OUTPATIENT
Start: 2019-01-26 | End: 2019-01-26

## 2019-01-26 RX ORDER — MAGNESIUM SULFATE 1 G/100ML
1 INJECTION INTRAVENOUS ONCE
Status: COMPLETED | OUTPATIENT
Start: 2019-01-26 | End: 2019-01-26

## 2019-01-26 RX ADMIN — TIMOLOL MALEATE 1 DROP: 5 SOLUTION OPHTHALMIC at 09:55

## 2019-01-26 RX ADMIN — ASPIRIN 81 MG 81 MG: 81 TABLET ORAL at 09:55

## 2019-01-26 RX ADMIN — MORPHINE SULFATE 1 MG: 2 INJECTION, SOLUTION INTRAMUSCULAR; INTRAVENOUS at 18:07

## 2019-01-26 RX ADMIN — MORPHINE SULFATE 1 MG: 2 INJECTION, SOLUTION INTRAMUSCULAR; INTRAVENOUS at 03:35

## 2019-01-26 RX ADMIN — MAGNESIUM SULFATE HEPTAHYDRATE 1 G: 1 INJECTION, SOLUTION INTRAVENOUS at 11:23

## 2019-01-26 RX ADMIN — AMPICILLIN SODIUM AND SULBACTAM SODIUM 3 G: 2; 1 INJECTION, POWDER, FOR SOLUTION INTRAMUSCULAR; INTRAVENOUS at 23:52

## 2019-01-26 RX ADMIN — MORPHINE SULFATE 1 MG: 2 INJECTION, SOLUTION INTRAMUSCULAR; INTRAVENOUS at 22:21

## 2019-01-26 RX ADMIN — NIACIN 1500 MG: 500 TABLET, FILM COATED, EXTENDED RELEASE ORAL at 22:20

## 2019-01-26 RX ADMIN — LOSARTAN POTASSIUM 50 MG: 50 TABLET, FILM COATED ORAL at 09:55

## 2019-01-26 RX ADMIN — VANCOMYCIN HYDROCHLORIDE 1250 MG: 1 INJECTION, POWDER, LYOPHILIZED, FOR SOLUTION INTRAVENOUS at 20:12

## 2019-01-26 RX ADMIN — TIMOLOL MALEATE 1 DROP: 5 SOLUTION OPHTHALMIC at 18:16

## 2019-01-26 RX ADMIN — MORPHINE SULFATE 1 MG: 2 INJECTION, SOLUTION INTRAMUSCULAR; INTRAVENOUS at 00:09

## 2019-01-26 RX ADMIN — AMPICILLIN SODIUM AND SULBACTAM SODIUM 3 G: 2; 1 INJECTION, POWDER, FOR SOLUTION INTRAMUSCULAR; INTRAVENOUS at 06:25

## 2019-01-26 RX ADMIN — POTASSIUM CHLORIDE 20 MEQ: 1500 TABLET, EXTENDED RELEASE ORAL at 09:54

## 2019-01-26 RX ADMIN — ATORVASTATIN CALCIUM 40 MG: 40 TABLET, FILM COATED ORAL at 18:07

## 2019-01-26 RX ADMIN — AMPICILLIN SODIUM AND SULBACTAM SODIUM 3 G: 2; 1 INJECTION, POWDER, FOR SOLUTION INTRAMUSCULAR; INTRAVENOUS at 18:07

## 2019-01-26 RX ADMIN — PREDNISOLONE ACETATE 1 DROP: 10 SUSPENSION/ DROPS OPHTHALMIC at 09:55

## 2019-01-26 RX ADMIN — AMPICILLIN SODIUM AND SULBACTAM SODIUM 3 G: 2; 1 INJECTION, POWDER, FOR SOLUTION INTRAMUSCULAR; INTRAVENOUS at 12:52

## 2019-01-26 RX ADMIN — VITAMIN D, TAB 1000IU (100/BT) 1000 UNITS: 25 TAB at 09:55

## 2019-01-26 RX ADMIN — ATROPINE SULFATE 1 DROP: 10 SOLUTION/ DROPS OPHTHALMIC at 09:55

## 2019-01-26 RX ADMIN — MORPHINE SULFATE 1 MG: 2 INJECTION, SOLUTION INTRAMUSCULAR; INTRAVENOUS at 11:22

## 2019-01-26 RX ADMIN — AMLODIPINE BESYLATE 10 MG: 5 TABLET ORAL at 09:54

## 2019-01-26 RX ADMIN — AMPICILLIN SODIUM AND SULBACTAM SODIUM 3 G: 2; 1 INJECTION, POWDER, FOR SOLUTION INTRAMUSCULAR; INTRAVENOUS at 00:00

## 2019-01-26 NOTE — NURSING NOTE
Dr Afsaneh Cole in to see pt, groin dressing clean dry and intact  Second unit of FFP transfused  Labs done, dr Afsaneh Cole notified of results, no new orders  Wound vac not operating properly, dr Wilder Lowery notified, wound vac removed, wet to dry bandage applied  Morphine given per order, pt currently resting comfortably, call bell within reach

## 2019-01-26 NOTE — ASSESSMENT & PLAN NOTE
· This is a chronic history  · However the patient does have an area on his left foot with necrosis verses eschar  · Patient is status post surgical debridement in the OR with Podiatry on 01/25/2019  · Continue wound VAC

## 2019-01-26 NOTE — PROGRESS NOTES
Progress Note - Marie Capps 3/5/1931, 80 y o  male MRN: [id number]    Unit/Bed#: -02 Encounter: [id number]    Primary Care Provider: Whitley Chandler MD   Date and time admitted to hospital: 1/23/2019  3:57 PM        * Abscess of left groin   Assessment & Plan    Status post I&D on 01/25/2019 by Dr Art Humphreys  Cultures taken and pending  On Unasyn/Vancomycin day#3  Continue packing today  Will resume wound VAC tomorrow  Acute blood loss anemia   Assessment & Plan    Hemoglobin stable at 8 7 today from 8 8 on 01/25  Down from 11 3 at admission  Continue to monitor  Wound currently hemostatic at left groin  Will monitor  Coagulopathy ([address] )   Assessment & Plan    Coumadin coagulopathy reversed with vitamin K  INR 1 26 today  Monitor groin wound for bleeding, currently hemostatic with pressure dressing  Subjective/Objective   Chief Complaint: "I was up all night"    Subjective: Patient fatigued from repeat dressing changes due to ongoing bleeding from his left groin wound last night  Currently not having any pain  Offers no complaints  Objective:     Blood pressure 96/58, pulse 70, temperature 97 5 °F (36 4 °C), temperature source Temporal, resp  rate 17, height 5' 7" (1 702 m), weight 77 1 kg (170 lb), SpO2 95 %  ,Body mass index is 26 63 kg/m²  Intake/Output Summary (Last 24 hours) at 01/26/19 1605  Last data filed at 01/26/19 1536   Gross per 24 hour   Intake             1395 ml   Output              650 ml   Net              745 ml       Invasive Devices     Peripheral Intravenous Line            Peripheral IV 01/23/19 Left 2 days    Peripheral IV 01/25/19 Right Forearm less than 1 day          Drain            Negative Pressure Wound Therapy (V A C ) Foot Other (Comment) 1 day    Negative Pressure Wound Therapy (V A C ) Leg Anterior 1 day                Physical Exam   Constitutional: He is oriented to person, place, and time  He appears well-developed and well-nourished  No distress  HENT:   Head: Normocephalic  Eyes: Pupils are equal, round, and reactive to light  Neck: Normal range of motion  Cardiovascular: An irregularly irregular rhythm present  No murmur heard  Pulmonary/Chest: Effort normal and breath sounds normal  No respiratory distress  Abdominal: Soft  Neurological: He is alert and oriented to person, place, and time  Skin: Skin is warm and dry  Capillary refill takes less than 2 seconds  He is not diaphoretic  Psychiatric: He has a normal mood and affect  Lab, Imaging and other studies:  I have personally reviewed pertinent lab results    , CBC:   Lab Results   Component Value Date    WBC 7 30 01/26/2019    HGB 8 7 (L) 01/26/2019    HCT 26 2 (L) 01/26/2019    MCV 88 01/26/2019     01/26/2019    MCH 29 1 01/26/2019    MCHC 33 2 01/26/2019    RDW 14 9 (H) 01/26/2019    MPV 8 1 (L) 01/26/2019    NRBC 0 01/26/2019   , CMP:   Lab Results   Component Value Date    SODIUM 138 01/26/2019    K 3 5 01/26/2019     01/26/2019    CO2 27 01/26/2019    BUN 13 01/26/2019    CREATININE 0 83 01/26/2019    CALCIUM 8 0 (L) 01/26/2019    EGFR 79 01/26/2019     VTE Pharmacologic Prophylaxis: Reason for no pharmacologic prophylaxis coagulopathy, bleeding, anemia  VTE Mechanical Prophylaxis: sequential compression device

## 2019-01-26 NOTE — PROGRESS NOTES
Progress Note - Podiatry/Wound   Chago Pinto 80 y o  male MRN: [id number]  Unit/Bed#: -02 Encounter: [id number]      Assessment:   Surgical wound left foot    Plan:   Wound VAC application at 682 mm Hg with black foam   Patient is anticipated for discharge early next week  We will see him for follow-up at the wound clinic and anticipate his next dressing change in 2 days time  Plan of care reviewed with the patient and his son  They are in agreement with the proposed treatment plan  Subjective:  Patient was seen this a m  Status post day 1 sharp debridement of left foot wound through the subcutaneous layer  Patient is resting comfortably relates no current pain  He denies fever chills night sweats  Objective: Wet to dry dressing secured with ABD and tape is present  Vitals: Blood pressure 122/60, pulse 81, temperature 98 °F (36 7 °C), temperature source Temporal, resp  rate 18, height 5' 7" (1 702 m), weight 77 1 kg (170 lb), SpO2 97 %  ,Body mass index is 26 63 kg/m²  Physical Exam:  Patient's left foot presents with dressing clean dry and intact  Upon removal of dressing minimal active bleeding is noted  No purulence no malodor no necrosis no kerwin wound erythema no calor  Lab, Imaging and other studies: I have personally reviewed pertinent reports  Negative Pressure Wound Therapy (V A C ) Leg Anterior (Active)   Target Pressure (mmHg) 125 1/25/2019 12:05 PM   Dressing Status Clean;Dry; Intact 1/25/2019 12:05 PM       Negative Pressure Wound Therapy (V A C ) Foot Other (Comment) (Active)   Target Pressure (mmHg) 125 1/25/2019  8:32 PM   Dressing Status New drainage 1/25/2019  8:32 PM       Incision 08/15/18 Knee Left (Active)       Incision 12/03/18 Groin Left (Active)       Incision 12/03/18 Foot Left (Active)       Incision 12/03/18 Abdomen Left (Active)       Incision 01/25/19 Foot Left (Active)   Incision Description NANCI 1/26/2019  9:54 AM   Dressing ABD;Dry dressing 1/26/2019  9:54 AM   Dressing Status Clean;Dry; Intact 1/26/2019  9:54 AM       Incision 01/25/19 Thigh Left (Active)   Incision Description NANCI 1/26/2019  9:54 AM   Dressing Dry dressing 1/26/2019  9:54 AM   Wound packed? Yes 1/26/2019  9:54 AM   Dressing Status Clean;Dry; Intact 1/26/2019  9:54 AM

## 2019-01-26 NOTE — ASSESSMENT & PLAN NOTE
· Patient had blood loss from surgical site and left groin as well as left foot  · Will continue to monitor  · Patient did receive FFP and vitamin K to reverse patient's coagulopathy  · Will transfuse as needed

## 2019-01-26 NOTE — ASSESSMENT & PLAN NOTE
Coumadin coagulopathy reversed with vitamin K  INR 1 26 today  Monitor groin wound for bleeding, currently hemostatic with pressure dressing

## 2019-01-26 NOTE — NURSING NOTE
Dressing to left groin bloody, small amounts leaking onto bedding  Dressing and packing removed, new packing inserted, 4x4 gauze, and abd  Pressure held for 30 minutes  Morphine given slightly early  Pt comfortable, call bell within reach  Will monitor

## 2019-01-26 NOTE — ASSESSMENT & PLAN NOTE
· Status post I and D in the OR with surgical team on 01/25/2019  · Continue both vancomycin and Unasyn  · Patient was having some postop bleeding at the site of I&D however at this time appears to be improved, will resume wound VAC once cleared by surgery  · Follow up cultures  · Trend procalcitonin  · Pain control as needed

## 2019-01-26 NOTE — ASSESSMENT & PLAN NOTE
Status post I&D on 01/25/2019 by Dr Kilpatrick Serve  Cultures taken and pending  On Unasyn/Vancomycin day#3  Continue packing today  Will resume wound VAC tomorrow

## 2019-01-26 NOTE — ASSESSMENT & PLAN NOTE
Hemoglobin stable at 8 7 today from 8 8 on 01/25  Down from 11 3 at admission  Continue to monitor  Wound currently hemostatic at left groin  Will monitor

## 2019-01-26 NOTE — ASSESSMENT & PLAN NOTE
· Chronic and stable  · Coumadin held due to surgery that was done on 01/25/2019 and patient did have a significant amount of bleeding from the groin site  · Patient received 2 units of FFP and vitamin K prior to surgery and also FFP/vitamin K after surgery due to postop bleeding

## 2019-01-26 NOTE — PROGRESS NOTES
Progress Note - Raheem Guerrero 3/5/1931, 80 y o  male MRN: [id number]    Unit/Bed#: -02 Encounter: [id number]    Primary Care Provider: Kaila Sebastian MD   Date and time admitted to hospital: 1/23/2019  3:57 PM        * Abscess of left groin   Assessment & Plan    · Status post I and D in the OR with surgical team on 01/25/2019  · Continue both vancomycin and Unasyn  · Patient was having some postop bleeding at the site of I&D however at this time appears to be improved, will resume wound VAC once cleared by surgery  · Follow up cultures  · Trend procalcitonin  · Pain control as needed       Malignant melanoma of left foot ([facility] [address] )   Assessment & Plan    · This is a chronic history  · However the patient does have an area on his left foot with necrosis verses eschar  · Patient is status post surgical debridement in the OR with Podiatry on 01/25/2019  · Continue wound VAC       Acute blood loss anemia   Assessment & Plan    · Patient had blood loss from surgical site and left groin as well as left foot  · Will continue to monitor  · Patient did receive FFP and vitamin K to reverse patient's coagulopathy  · Will transfuse as needed     Chronic atrial fibrillation ([address] )   Assessment & Plan    · Chronic and stable  · Coumadin held due to surgery that was done on 01/25/2019 and patient did have a significant amount of bleeding from the groin site  · Patient received 2 units of FFP and vitamin K prior to surgery and also FFP/vitamin K after surgery due to postop bleeding     Hyperlipidemia   Assessment & Plan    · Chronic and stable  · Continue niacin     Essential hypertension   Assessment & Plan    · Chronic and stable  · Continue Norvasc       VTE Pharmacologic Prophylaxis: Pharmacologic: Held due to postop bleeding    Will follow up with surgery regarding when it is safe to resume DVT prophylaxis and then eventually Coumadin    Patient Centered Rounds: I have performed bedside rounds with nursing staff today     Discussions with Specialists or Other Care Team Provider: yes  Education and Discussions with Family / Patient: yes    Time Spent for Care: 30 minutes  More than 50% of total time spent on counseling and coordination of care as described above  Current Length of Stay: 3 day(s)    Current Patient Status: Inpatient   Certification Statement: The patient will continue to require additional inpatient hospital stay due to Abscess and foot wound    Discharge Plan:  Pending hospital course    Code Status: Level 3 - DNAR and DNI    Subjective:   Patient did have postoperative bleeding yesterday evening requiring further FFP to be given as well as vitamin K  Patient required multiple packing changes and significant length of pressure applied to surgical wound and hemostasis was finally achieved yesterday evening  Today no signs of active bleeding  Patient states he does feel better  Patient has been spiking fevers with T-max of a 100 7° over the last 24 hours  Patient is tolerating diet  Objective:     Vitals:   Temp (24hrs), Av 5 °F (37 5 °C), Min:98 °F (36 7 °C), Max:100 7 °F (38 2 °C)    Temp:  [98 °F (36 7 °C)-100 7 °F (38 2 °C)] 98 °F (36 7 °C)  HR:  [64-85] 81  Resp:  [12-20] 18  BP: (101-122)/(52-67) 122/60  SpO2:  [90 %-97 %] 97 %  Body mass index is 26 63 kg/m²  Input and Output Summary (last 24 hours): Intake/Output Summary (Last 24 hours) at 19 1436  Last data filed at 19 1001   Gross per 24 hour   Intake             1245 ml   Output              450 ml   Net              795 ml       Physical Exam:     Physical Exam   Constitutional: He appears well-developed  No distress  HENT:   Head: Normocephalic and atraumatic  Eyes: Conjunctivae and EOM are normal    Neck: Normal range of motion  Neck supple  Cardiovascular: Normal rate and regular rhythm  Pulmonary/Chest: Effort normal  No respiratory distress  Abdominal: Soft  He exhibits no distension  Musculoskeletal: Normal range of motion  He exhibits no edema  Neurological: He is alert  No cranial nerve deficit  Skin:   Left groin wound with dressing in place that appears to be clean dry and intact  Left foot wound with dressing in place as well as wound VAC  Psychiatric: He has a normal mood and affect  Additional Data:     Labs:      Results from last 7 days  Lab Units 01/26/19  0606   WBC Thousand/uL 7 30   HEMOGLOBIN g/dL 8 7*   HEMATOCRIT % 26 2*   PLATELETS Thousands/uL 198   NEUTROS PCT % 68   LYMPHS PCT % 20*   MONOS PCT % 7   EOS PCT % 5       Results from last 7 days  Lab Units 01/26/19  0606  01/24/19  0524   POTASSIUM mmol/L 3 5  < > 3 9   CHLORIDE mmol/L 102  < > 103   CO2 mmol/L 27  < > 25   BUN mg/dL 13  < > 19   CREATININE mg/dL 0 83  < > 1 10   CALCIUM mg/dL 8 0*  < > 8 3*   ALK PHOS U/L  --   --  74   ALT U/L  --   --  47   AST U/L  --   --  42*   < > = values in this interval not displayed  Results from last 7 days  Lab Units 01/26/19  0606   INR  1 26               * I Have Reviewed All Lab Data Listed Above  * Additional Pertinent Lab Tests Reviewed: Jenni [address] Admission  Reviewed    Imaging:  Imaging Reports Reviewed Today Include:  No new imaging    Recent Cultures (last 7 days):       Results from last 7 days  Lab Units 01/25/19  1116 01/23/19  1648   BLOOD CULTURE   --  No Growth at 48 hrs  No Growth at 48 hrs     GRAM STAIN RESULT  2+ Polys  2+ Gram positive cocci in pairs  1+ Gram positive cocci in pairs and chains  Rare Gram negative rods  --    WOUND CULTURE  3+ Growth of Beta Hemolytic Streptococcus Group B*  --        Last 24 Hours Medication List:     Current Facility-Administered Medications:  acetaminophen 650 mg Oral Q4H PRN Aliya Ramirez MD    amLODIPine 10 mg Oral Daily TRACE Simmons    ampicillin-sulbactam 3 g Intravenous Q6H TRACE Simmons Last Rate: 3 g (01/26/19 1252)   aspirin 81 mg Oral Daily Jessica SALCIDO Hussein, TRACE    atorvastatin 40 mg Oral Daily With [employer], TRACE    atropine 1 drop Right Eye Daily Jessica Savage, TRACE    cholecalciferol 1,000 Units Oral Daily Jessica Savage, TRACE    lactated ringers 100 mL/hr Intravenous Continuous Margaux Bangura MD    losartan 50 mg Oral Daily Jessica Savage, KERONNP    morphine injection 1 mg Intravenous Q4H PRN Haider Alejo PA-C    niacin 1,500 mg Oral HS TRACE Simmons    ondansetron 4 mg Intravenous Q6H PRN Jessica Savage, TRACE    oxyCODONE 5 mg Oral Q4H PRN Liliya Hunter MD    potassium chloride 20 mEq Intravenous Once Milton Robbins MD    prednisoLONE acetate 1 drop Right Eye Daily TRACE Simmons    timolol 1 drop Left Eye BID Jessica Savage, TRACE    vancomycin 15 mg/kg Intravenous Q24H TRACE Simmons Last Rate: 1,250 mg (01/25/19 2033)        Today, Patient Was Seen By: Milton Robbins MD    ** Please Note: Dictation voice to text software may have been used in the creation of this document   **

## 2019-01-27 PROBLEM — L02.416 CELLULITIS AND ABSCESS OF LEFT LOWER EXTREMITY: Status: ACTIVE | Noted: 2019-01-23

## 2019-01-27 PROBLEM — L03.116 CELLULITIS AND ABSCESS OF LEFT LOWER EXTREMITY: Status: ACTIVE | Noted: 2019-01-23

## 2019-01-27 LAB
ANION GAP SERPL CALCULATED.3IONS-SCNC: 5 MMOL/L (ref 4–13)
BACTERIA SPEC ANAEROBE CULT: NORMAL
BASOPHILS # BLD AUTO: 0 THOUSANDS/ΜL (ref 0–0.1)
BASOPHILS NFR BLD AUTO: 0 % (ref 0–2)
BUN SERPL-MCNC: 13 MG/DL (ref 7–25)
CALCIUM SERPL-MCNC: 8 MG/DL (ref 8.6–10.5)
CHLORIDE SERPL-SCNC: 105 MMOL/L (ref 98–107)
CO2 SERPL-SCNC: 29 MMOL/L (ref 21–31)
CREAT SERPL-MCNC: 0.86 MG/DL (ref 0.7–1.3)
EOSINOPHIL # BLD AUTO: 0.3 THOUSAND/ΜL (ref 0–0.61)
EOSINOPHIL NFR BLD AUTO: 4 % (ref 0–5)
ERYTHROCYTE [DISTWIDTH] IN BLOOD BY AUTOMATED COUNT: 15 % (ref 11.5–14.5)
GFR SERPL CREATININE-BSD FRML MDRD: 78 ML/MIN/1.73SQ M
GLUCOSE SERPL-MCNC: 110 MG/DL (ref 65–99)
HCT VFR BLD AUTO: 25.4 % (ref 36.5–49.3)
HGB BLD-MCNC: 8.5 G/DL (ref 14–18)
LYMPHOCYTES # BLD AUTO: 1.4 THOUSANDS/ΜL (ref 0.6–4.47)
LYMPHOCYTES NFR BLD AUTO: 18 % (ref 21–51)
MCH RBC QN AUTO: 28.9 PG (ref 26–34)
MCHC RBC AUTO-ENTMCNC: 33.6 G/DL (ref 31–37)
MCV RBC AUTO: 86 FL (ref 81–99)
MONOCYTES # BLD AUTO: 0.5 THOUSAND/ΜL (ref 0.17–1.22)
MONOCYTES NFR BLD AUTO: 7 % (ref 2–12)
NEUTROPHILS # BLD AUTO: 5.4 THOUSANDS/ΜL (ref 1.4–6.5)
NEUTS SEG NFR BLD AUTO: 71 % (ref 42–75)
NRBC BLD AUTO-RTO: 0 /100 WBCS
PLATELET # BLD AUTO: 223 THOUSANDS/UL (ref 149–390)
PMV BLD AUTO: 8 FL (ref 8.6–11.7)
POTASSIUM SERPL-SCNC: 4.1 MMOL/L (ref 3.5–5.5)
PROCALCITONIN SERPL-MCNC: 0.12 NG/ML
RBC # BLD AUTO: 2.95 MILLION/UL (ref 4.3–5.9)
SODIUM SERPL-SCNC: 139 MMOL/L (ref 134–143)
WBC # BLD AUTO: 7.6 THOUSAND/UL (ref 4.8–10.8)

## 2019-01-27 PROCEDURE — 99232 SBSQ HOSP IP/OBS MODERATE 35: CPT | Performed by: INTERNAL MEDICINE

## 2019-01-27 PROCEDURE — 80048 BASIC METABOLIC PNL TOTAL CA: CPT | Performed by: INTERNAL MEDICINE

## 2019-01-27 PROCEDURE — 85025 COMPLETE CBC W/AUTO DIFF WBC: CPT | Performed by: INTERNAL MEDICINE

## 2019-01-27 PROCEDURE — 84145 PROCALCITONIN (PCT): CPT | Performed by: INTERNAL MEDICINE

## 2019-01-27 PROCEDURE — 99024 POSTOP FOLLOW-UP VISIT: CPT | Performed by: PHYSICIAN ASSISTANT

## 2019-01-27 RX ORDER — DOCUSATE SODIUM 100 MG/1
100 CAPSULE, LIQUID FILLED ORAL 2 TIMES DAILY
Status: DISCONTINUED | OUTPATIENT
Start: 2019-01-27 | End: 2019-01-31 | Stop reason: HOSPADM

## 2019-01-27 RX ADMIN — VANCOMYCIN HYDROCHLORIDE 1250 MG: 1 INJECTION, POWDER, LYOPHILIZED, FOR SOLUTION INTRAVENOUS at 20:09

## 2019-01-27 RX ADMIN — PREDNISOLONE ACETATE 1 DROP: 10 SUSPENSION/ DROPS OPHTHALMIC at 09:25

## 2019-01-27 RX ADMIN — VITAMIN D, TAB 1000IU (100/BT) 1000 UNITS: 25 TAB at 09:30

## 2019-01-27 RX ADMIN — MORPHINE SULFATE 2 MG: 2 INJECTION, SOLUTION INTRAMUSCULAR; INTRAVENOUS at 08:46

## 2019-01-27 RX ADMIN — ATORVASTATIN CALCIUM 40 MG: 40 TABLET, FILM COATED ORAL at 17:18

## 2019-01-27 RX ADMIN — DOCUSATE SODIUM 100 MG: 100 CAPSULE, LIQUID FILLED ORAL at 17:18

## 2019-01-27 RX ADMIN — TIMOLOL MALEATE 1 DROP: 5 SOLUTION OPHTHALMIC at 17:18

## 2019-01-27 RX ADMIN — AMPICILLIN SODIUM AND SULBACTAM SODIUM 3 G: 2; 1 INJECTION, POWDER, FOR SOLUTION INTRAMUSCULAR; INTRAVENOUS at 05:35

## 2019-01-27 RX ADMIN — LOSARTAN POTASSIUM 50 MG: 50 TABLET, FILM COATED ORAL at 09:30

## 2019-01-27 RX ADMIN — TIMOLOL MALEATE 1 DROP: 5 SOLUTION OPHTHALMIC at 09:26

## 2019-01-27 RX ADMIN — AMLODIPINE BESYLATE 10 MG: 5 TABLET ORAL at 09:30

## 2019-01-27 RX ADMIN — AMPICILLIN SODIUM AND SULBACTAM SODIUM 3 G: 2; 1 INJECTION, POWDER, FOR SOLUTION INTRAMUSCULAR; INTRAVENOUS at 12:12

## 2019-01-27 RX ADMIN — ATROPINE SULFATE 1 DROP: 10 SOLUTION/ DROPS OPHTHALMIC at 09:30

## 2019-01-27 RX ADMIN — DOCUSATE SODIUM 100 MG: 100 CAPSULE, LIQUID FILLED ORAL at 12:11

## 2019-01-27 RX ADMIN — MORPHINE SULFATE 1 MG: 2 INJECTION, SOLUTION INTRAMUSCULAR; INTRAVENOUS at 18:33

## 2019-01-27 RX ADMIN — ASPIRIN 81 MG 81 MG: 81 TABLET ORAL at 09:30

## 2019-01-27 RX ADMIN — AMPICILLIN SODIUM AND SULBACTAM SODIUM 3 G: 2; 1 INJECTION, POWDER, FOR SOLUTION INTRAMUSCULAR; INTRAVENOUS at 17:18

## 2019-01-27 RX ADMIN — NIACIN 1500 MG: 500 TABLET, FILM COATED, EXTENDED RELEASE ORAL at 22:57

## 2019-01-27 RX ADMIN — VANCOMYCIN HYDROCHLORIDE 1250 MG: 1 INJECTION, POWDER, LYOPHILIZED, FOR SOLUTION INTRAVENOUS at 09:41

## 2019-01-27 RX ADMIN — AMPICILLIN SODIUM AND SULBACTAM SODIUM 3 G: 2; 1 INJECTION, POWDER, FOR SOLUTION INTRAMUSCULAR; INTRAVENOUS at 23:01

## 2019-01-27 NOTE — ASSESSMENT & PLAN NOTE
· Chronic and stable  · Coumadin held due to surgery that was done on 01/25/2019 and patient did have a significant amount of bleeding from the groin site  · Patient received 2 units of FFP and vitamin K prior to surgery and also FFP/vitamin K after surgery due to postop bleeding  · Will discuss with surgery regarding when safe to resume Coumadin

## 2019-01-27 NOTE — NURSING NOTE
Dr Joyce Todd and yasmeen pa were in to see and assess the pt this am, left groin dsg not changed at that time d/t pt refused and was just changed around 0600  This am, dsg was cdi at that time and remained cdi all day, left groin dsg changed at 1820 per the md, small amount of blood noted pooling in the bottom of the inc, area packed with sterile britni, covered with several 4 by 4's and 2 abd, pressure held for approx 45 minutes, yasmeen jefferson called with an update and made aware of the bleeding noted after packing was slowly removed with sterile saline, no new orders received, report to the oncoming shift, dr Aarti Lanza was in earlier in the day to reapply the pts left foot wound vac and the pt tolerated well, no issues noted with the suction today, pt presently in bed in no acute distress callbell in reach of the pt

## 2019-01-27 NOTE — PROGRESS NOTES
Progress Note - Nela Pastrana 3/5/1931, 80 y o  male MRN: [id number]    Unit/Bed#: -02 Encounter: [id number]    Primary Care Provider: Sylvia José MD   Date and time admitted to hospital: 1/23/2019  3:57 PM        * Abscess of left groin   Assessment & Plan    · Status post I and D in the OR with surgical team on 01/25/2019  · Continue both vancomycin and Unasyn  · Patient will be going back to the OR tomorrow for packing change  · Follow up wound cultures  · Trend procalcitonin  · Pain control as needed       Malignant melanoma of left foot ([facility] [address] )   Assessment & Plan    · This is a chronic history  · However the patient does have an area on his left foot with necrosis verses eschar  · Patient is status post surgical debridement in the OR with Podiatry on 01/25/2019  · Continue wound VAC       Acute blood loss anemia   Assessment & Plan    · Patient had blood loss from surgical site and left groin as well as left foot  · Will continue to monitor  · Patient did receive FFP and vitamin K to reverse patient's coagulopathy  · Will transfuse as needed     Chronic atrial fibrillation ([address] )   Assessment & Plan    · Chronic and stable  · Coumadin held due to surgery that was done on 01/25/2019 and patient did have a significant amount of bleeding from the groin site  · Patient received 2 units of FFP and vitamin K prior to surgery and also FFP/vitamin K after surgery due to postop bleeding  · Will discuss with surgery regarding when safe to resume Coumadin     Hyperlipidemia   Assessment & Plan    · Chronic and stable  · Continue niacin     Essential hypertension   Assessment & Plan    · Chronic and stable  · Continue Norvasc       VTE Pharmacologic Prophylaxis: Pharmacologic: Held due to postoperative bleeding    Patient Centered Rounds: I have performed bedside rounds with nursing staff today      Discussions with Specialists or Other Care Team Provider: yes  Education and Discussions with Family / Patient: yes    Time Spent for Care: 30 minutes  More than 50% of total time spent on counseling and coordination of care as described above  Current Length of Stay: 4 day(s)    Current Patient Status: Inpatient   Certification Statement: The patient will continue to require additional inpatient hospital stay due to Left groin abscess    Discharge Plan:  Pending hospital course    Code Status: Level 3 - DNAR and DNI    Subjective:   Patient states he feels better today  Pain controlled  No nausea or vomiting  Left foot wound VAC working appropriately  Wound VAC for left groin wound has been held due to postoperative bleeding patient will go to OR tomorrow for packing change  Objective:     Vitals:   Temp (24hrs), Av 1 °F (37 3 °C), Min:97 8 °F (36 6 °C), Max:100 7 °F (38 2 °C)    Temp:  [97 8 °F (36 6 °C)-100 7 °F (38 2 °C)] 98 7 °F (37 1 °C)  HR:  [] 83  Resp:  [17-18] 18  BP: (102-122)/(55-70) 114/68  SpO2:  [95 %-99 %] 95 %  Body mass index is 26 63 kg/m²  Input and Output Summary (last 24 hours): Intake/Output Summary (Last 24 hours) at 19 1716  Last data filed at 19 0028   Gross per 24 hour   Intake              410 ml   Output              300 ml   Net              110 ml       Physical Exam:     Physical Exam   Constitutional: He appears well-developed  No distress  HENT:   Head: Normocephalic and atraumatic  Eyes: Conjunctivae and EOM are normal    Neck: Normal range of motion  Neck supple  Cardiovascular: Normal rate and regular rhythm  Pulmonary/Chest: Effort normal  No respiratory distress  Abdominal: Soft  He exhibits no distension  Musculoskeletal: Normal range of motion  He exhibits no edema  Neurological: He is alert  No cranial nerve deficit  Skin:   Left groin wound with dressing in place appears to be clean dry and intact    Left foot wound with wound VAC in place       Additional Data:     Labs:      Results from last 7 days  Lab Units 01/27/19  0529   WBC Thousand/uL 7 60   HEMOGLOBIN g/dL 8 5*   HEMATOCRIT % 25 4*   PLATELETS Thousands/uL 223   NEUTROS PCT % 71   LYMPHS PCT % 18*   MONOS PCT % 7   EOS PCT % 4       Results from last 7 days  Lab Units 01/27/19  0529  01/24/19  0524   POTASSIUM mmol/L 4 1  < > 3 9   CHLORIDE mmol/L 105  < > 103   CO2 mmol/L 29  < > 25   BUN mg/dL 13  < > 19   CREATININE mg/dL 0 86  < > 1 10   CALCIUM mg/dL 8 0*  < > 8 3*   ALK PHOS U/L  --   --  74   ALT U/L  --   --  47   AST U/L  --   --  42*   < > = values in this interval not displayed  Results from last 7 days  Lab Units 01/26/19  0606   INR  1 26               * I Have Reviewed All Lab Data Listed Above  * Additional Pertinent Lab Tests Reviewed: Jenni 66 Admission  Reviewed    Imaging:  Imaging Reports Reviewed Today Include:  No new imaging    Recent Cultures (last 7 days):       Results from last 7 days  Lab Units 01/25/19  1116 01/23/19  1648   BLOOD CULTURE   --  No Growth at 72 hrs  No Growth at 72 hrs     GRAM STAIN RESULT  2+ Polys  2+ Gram positive cocci in pairs  1+ Gram positive cocci in pairs and chains  Rare Gram negative rods  --    WOUND CULTURE  3+ Growth of Beta Hemolytic Streptococcus Group B*  Growth in Broth culture only Gram Negative Benjamin*  --        Last 24 Hours Medication List:     Current Facility-Administered Medications:  acetaminophen 650 mg Oral Q4H PRN Hoang Castellano MD    amLODIPine 10 mg Oral Daily TRACE Simmons    ampicillin-sulbactam 3 g Intravenous Q6H TRACE Simmons Last Rate: 3 g (01/27/19 1212)   aspirin 81 mg Oral Daily TRACE Simmons    atorvastatin 40 mg Oral Daily With [employer]    atropine 1 drop Right Eye Daily TRACE Simmons    cholecalciferol 1,000 Units Oral Daily TRACE Simmons    docusate sodium 100 mg Oral BID Hoang Castellano MD    losartan 50 mg Oral Daily TRACE Simmons    morphine injection 1 mg Intravenous Q4H PRN Sravan Maxwell PA-C    niacin 1,500 mg Oral HS TRACE Simmons    ondansetron 4 mg Intravenous Q6H PRN TRACE Hdz    oxyCODONE 5 mg Oral Q4H PRN Dimas Moreno MD    potassium chloride 20 mEq Intravenous Once Dora Ordonez MD    prednisoLONE acetate 1 drop Right Eye Daily TRACE Simmons    timolol 1 drop Left Eye BID TRACE Simmons    vancomycin 15 mg/kg Intravenous Q12H TRACE Simmons Last Rate: 1,250 mg (01/27/19 0941)        Today, Patient Was Seen By: Dora Ordonez MD    ** Please Note: Dictation voice to text software may have been used in the creation of this document   **

## 2019-01-27 NOTE — ASSESSMENT & PLAN NOTE
Hemoglobin stable at 8 5 today from 8 7 on 01/26 and 8 8 on 01/25  Down from 11 3 at admission  Continue to monitor  Wound currently hemostatic at left groin with re-bleed noted on packing changes  Will monitor

## 2019-01-27 NOTE — NURSING NOTE
Pt refused dressing change this AM  To be NPO after midnight for dressing change to be done in the OR tomorrow 1/27/19  Will continue to monitor

## 2019-01-27 NOTE — SOCIAL WORK
Pt will require wound vac upon discharge  CM met with pt and family to discuss same  Pt will also require [facility] for wound care  Pt agreeable to same and was given choices  Pt chose [facility]  Referral made and able to accept pt  CM spoke with Dr Jessica Etienne regarding wound vac order form  CM left same for Dr Jessica Etienne in pt chart  Per Dr Jessica Etienne he will complete same 1/28  CM to fax to [facility] after completion for wound vac delivery  CM will continue to follow

## 2019-01-27 NOTE — PROGRESS NOTES
Progress Note - Dulce Marai Labs 3/5/1931, 80 y o  male MRN: [id number]    Unit/Bed#: -02 Encounter: [id number]    Primary Care Provider: Michelle Damico MD   Date and time admitted to hospital: 1/23/2019  3:57 PM        * Abscess of left groin   Assessment & Plan    Status post I&D on 01/25/2019 by Dr Dick Kirk  Cultures taken and showing streptococcus  On Unasyn/Vancomycin day#4, defer to primary service to adjust antibiotics, pharmacy has recommended penicillin  Patient not tolerating bedside packing changes, will arrange for packing change and wound VAC application in the OR tomorrow 01/28/2019  Acute blood loss anemia   Assessment & Plan    Hemoglobin stable at 8 5 today from 8 7 on 01/26 and 8 8 on 01/25  Down from 11 3 at admission  Continue to monitor  Wound currently hemostatic at left groin with re-bleed noted on packing changes  Will monitor  Coagulopathy ([facility] [address] )   Assessment & Plan    Coumadin coagulopathy reversed with vitamin K  INR 1 26 yesterday  Monitor groin wound for bleeding, currently hemostatic with pressure dressing  Subjective/Objective   Chief Complaint: "It hurts too much"    Subjective: Patient having severe pain with each packing change  He has requested sedation for the next packing change and does not consent to bedside packing change without it  Offers no other complaints  Objective:     Blood pressure 122/56, pulse 72, temperature 97 8 °F (36 6 °C), temperature source Temporal, resp  rate 18, height 5' 7" (1 702 m), weight 77 1 kg (170 lb), SpO2 99 %  ,Body mass index is 26 63 kg/m²        Intake/Output Summary (Last 24 hours) at 01/27/19 1142  Last data filed at 01/27/19 0028   Gross per 24 hour   Intake              760 ml   Output              500 ml   Net              260 ml       Invasive Devices     Peripheral Intravenous Line            Peripheral IV 01/25/19 Right Forearm 1 day          Drain            Negative Pressure Wound Therapy (V A C ) Foot Other (Comment) 2 days    Negative Pressure Wound Therapy (V A C ) Leg Anterior 2 days                Physical Exam   Constitutional: He is oriented to person, place, and time  He appears well-developed and well-nourished  No distress  HENT:   Head: Normocephalic and atraumatic  Eyes: Pupils are equal, round, and reactive to light  Conjunctivae and EOM are normal    Neck: Normal range of motion  Pulmonary/Chest: No respiratory distress  Musculoskeletal: Normal range of motion  Neurological: He is alert and oriented to person, place, and time  Skin: Skin is warm and dry  Capillary refill takes less than 2 seconds  He is not diaphoretic  Psychiatric: He has a normal mood and affect  His behavior is normal          Lab, Imaging and other studies:  I have personally reviewed pertinent lab results    , CBC:   Lab Results   Component Value Date    WBC 7 60 01/27/2019    HGB 8 5 (L) 01/27/2019    HCT 25 4 (L) 01/27/2019    MCV 86 01/27/2019     01/27/2019    MCH 28 9 01/27/2019    MCHC 33 6 01/27/2019    RDW 15 0 (H) 01/27/2019    MPV 8 0 (L) 01/27/2019    NRBC 0 01/27/2019     VTE Pharmacologic Prophylaxis: Reason for no pharmacologic prophylaxis bleeding from wound  VTE Mechanical Prophylaxis: sequential compression device

## 2019-01-27 NOTE — ASSESSMENT & PLAN NOTE
Status post I&D on 01/25/2019 by Dr Severa Mart  Cultures taken and showing streptococcus  On Unasyn/Vancomycin day#4, defer to primary service to adjust antibiotics, pharmacy has recommended penicillin  Patient not tolerating bedside packing changes, will arrange for packing change and wound VAC application in the OR tomorrow 01/28/2019

## 2019-01-27 NOTE — ASSESSMENT & PLAN NOTE
· Status post I and D in the OR with surgical team on 01/25/2019  · Continue both vancomycin and Unasyn  · Patient will be going back to the OR tomorrow for packing change  · Follow up wound cultures  · Trend procalcitonin  · Pain control as needed

## 2019-01-27 NOTE — CONSULTS
Vancomycin Assessment    Dulce Maria Akers is a 80 y o  male who is currently receiving vancomycin 1250 mg IV q24 for skin-soft tissue infection     Relevant clinical data and objective history reviewed:  Creatinine   Date Value Ref Range Status   01/26/2019 0 83 0 70 - 1 30 mg/dL Final     Comment:     Standardized to IDMS reference method   01/25/2019 0 82 0 70 - 1 30 mg/dL Final     Comment:     Standardized to IDMS reference method   01/24/2019 1 10 0 70 - 1 30 mg/dL Final     Comment:     Standardized to IDMS reference method     /70 (BP Location: Right arm)   Pulse 70   Temp 97 5 °F (36 4 °C) (Temporal)   Resp 17   Ht 5' 7" (1 702 m)   Wt 77 1 kg (170 lb)   SpO2 95%   BMI 26 63 kg/m²   I/O last 3 completed shifts: In: 5507 [P O :1070; I V :700; Blood:625; IV Piggyback:310]  Out: 850 [Urine:850]  Lab Results   Component Value Date/Time    BUN 13 01/26/2019 06:06 AM    WBC 7 30 01/26/2019 06:06 AM    WBC 6 9 06/18/2018 12:45 PM    HGB 8 7 (L) 01/26/2019 06:06 AM    HGB 14 1 06/18/2018 12:45 PM    HCT 26 2 (L) 01/26/2019 06:06 AM    HCT 41 8 (L) 06/18/2018 12:45 PM    MCV 88 01/26/2019 06:06 AM    MCV 89 5 06/18/2018 12:45 PM     01/26/2019 06:06 AM     06/18/2018 12:45 PM     Temp Readings from Last 3 Encounters:   01/26/19 97 5 °F (36 4 °C) (Temporal)   12/21/18 97 5 °F (36 4 °C)   12/03/18 97 6 °F (36 4 °C)     Vancomycin Days of Therapy: 4    Assessment/Plan  The patient is currently on vancomycin utilizing scheduled dosing  Baseline risks associated with therapy include: advanced age  The patient is receiving 1250 mg IV q24 with the most recent vancomycin level being at steady-state and sub-therapeutic based on a goal of 10-15 (mild infection/cellulitis) ; therefore, after clinical evaluation will be changed to 1250 mg IV q12   Pharmacy will continue to follow closely for s/sx of nephrotoxicity, infusion reactions and appropriateness of therapy    BMP and CBC will be ordered per protocol  Plan for trough as patient approaches steady state, prior to the 4th  dose at approximately 0730 on 1/28/19  Pharmacy will continue to follow the patients culture results and clinical progress daily      Luis Avila, Pharmacist

## 2019-01-27 NOTE — ASSESSMENT & PLAN NOTE
Coumadin coagulopathy reversed with vitamin K  INR 1 26 yesterday  Monitor groin wound for bleeding, currently hemostatic with pressure dressing

## 2019-01-28 ENCOUNTER — ANESTHESIA (INPATIENT)
Dept: PERIOP | Facility: HOSPITAL | Age: 84
DRG: 857 | End: 2019-01-28
Payer: MEDICARE

## 2019-01-28 ENCOUNTER — ANESTHESIA EVENT (INPATIENT)
Dept: PERIOP | Facility: HOSPITAL | Age: 84
DRG: 857 | End: 2019-01-28
Payer: MEDICARE

## 2019-01-28 LAB
ANION GAP SERPL CALCULATED.3IONS-SCNC: 8 MMOL/L (ref 4–13)
BACTERIA BLD CULT: NORMAL
BACTERIA BLD CULT: NORMAL
BACTERIA WND AEROBE CULT: ABNORMAL
BACTERIA WND AEROBE CULT: ABNORMAL
BASOPHILS # BLD AUTO: 0 THOUSANDS/ΜL (ref 0–0.1)
BASOPHILS NFR BLD AUTO: 1 % (ref 0–2)
BUN SERPL-MCNC: 10 MG/DL (ref 7–25)
CALCIUM SERPL-MCNC: 8.3 MG/DL (ref 8.6–10.5)
CHLORIDE SERPL-SCNC: 105 MMOL/L (ref 98–107)
CO2 SERPL-SCNC: 26 MMOL/L (ref 21–31)
CREAT SERPL-MCNC: 0.83 MG/DL (ref 0.7–1.3)
EOSINOPHIL # BLD AUTO: 0.4 THOUSAND/ΜL (ref 0–0.61)
EOSINOPHIL NFR BLD AUTO: 5 % (ref 0–5)
ERYTHROCYTE [DISTWIDTH] IN BLOOD BY AUTOMATED COUNT: 15.3 % (ref 11.5–14.5)
GFR SERPL CREATININE-BSD FRML MDRD: 79 ML/MIN/1.73SQ M
GLUCOSE SERPL-MCNC: 96 MG/DL (ref 65–99)
GRAM STN SPEC: ABNORMAL
HCT VFR BLD AUTO: 26.6 % (ref 36.5–49.3)
HGB BLD-MCNC: 8.7 G/DL (ref 14–18)
LYMPHOCYTES # BLD AUTO: 1.3 THOUSANDS/ΜL (ref 0.6–4.47)
LYMPHOCYTES NFR BLD AUTO: 17 % (ref 21–51)
MCH RBC QN AUTO: 28.7 PG (ref 26–34)
MCHC RBC AUTO-ENTMCNC: 32.8 G/DL (ref 31–37)
MCV RBC AUTO: 87 FL (ref 81–99)
MONOCYTES # BLD AUTO: 0.4 THOUSAND/ΜL (ref 0.17–1.22)
MONOCYTES NFR BLD AUTO: 6 % (ref 2–12)
NEUTROPHILS # BLD AUTO: 5.4 THOUSANDS/ΜL (ref 1.4–6.5)
NEUTS SEG NFR BLD AUTO: 72 % (ref 42–75)
NRBC BLD AUTO-RTO: 0 /100 WBCS
PLATELET # BLD AUTO: 288 THOUSANDS/UL (ref 149–390)
PMV BLD AUTO: 8.1 FL (ref 8.6–11.7)
POTASSIUM SERPL-SCNC: 4.1 MMOL/L (ref 3.5–5.5)
RBC # BLD AUTO: 3.04 MILLION/UL (ref 4.3–5.9)
SODIUM SERPL-SCNC: 139 MMOL/L (ref 134–143)
VANCOMYCIN TROUGH SERPL-MCNC: 15.3 UG/ML (ref 5–12)
WBC # BLD AUTO: 7.6 THOUSAND/UL (ref 4.8–10.8)

## 2019-01-28 PROCEDURE — 97167 OT EVAL HIGH COMPLEX 60 MIN: CPT

## 2019-01-28 PROCEDURE — G8979 MOBILITY GOAL STATUS: HCPCS

## 2019-01-28 PROCEDURE — 2W1MX6Z COMPRESSION OF LEFT LOWER EXTREMITY USING PRESSURE DRESSING: ICD-10-PCS | Performed by: SURGERY

## 2019-01-28 PROCEDURE — 80202 ASSAY OF VANCOMYCIN: CPT | Performed by: NURSE PRACTITIONER

## 2019-01-28 PROCEDURE — 97163 PT EVAL HIGH COMPLEX 45 MIN: CPT

## 2019-01-28 PROCEDURE — 80048 BASIC METABOLIC PNL TOTAL CA: CPT | Performed by: INTERNAL MEDICINE

## 2019-01-28 PROCEDURE — G8988 SELF CARE GOAL STATUS: HCPCS

## 2019-01-28 PROCEDURE — G8987 SELF CARE CURRENT STATUS: HCPCS

## 2019-01-28 PROCEDURE — G8978 MOBILITY CURRENT STATUS: HCPCS

## 2019-01-28 PROCEDURE — 15852 DRESSING CHANGE NOT FOR BURN: CPT | Performed by: PHYSICIAN ASSISTANT

## 2019-01-28 PROCEDURE — 85025 COMPLETE CBC W/AUTO DIFF WBC: CPT | Performed by: INTERNAL MEDICINE

## 2019-01-28 PROCEDURE — 99232 SBSQ HOSP IP/OBS MODERATE 35: CPT | Performed by: INTERNAL MEDICINE

## 2019-01-28 RX ORDER — PROPOFOL 10 MG/ML
INJECTION, EMULSION INTRAVENOUS AS NEEDED
Status: DISCONTINUED | OUTPATIENT
Start: 2019-01-28 | End: 2019-01-28 | Stop reason: SURG

## 2019-01-28 RX ORDER — BUPIVACAINE HYDROCHLORIDE 5 MG/ML
INJECTION, SOLUTION PERINEURAL AS NEEDED
Status: DISCONTINUED | OUTPATIENT
Start: 2019-01-28 | End: 2019-01-28 | Stop reason: HOSPADM

## 2019-01-28 RX ORDER — ONDANSETRON 2 MG/ML
4 INJECTION INTRAMUSCULAR; INTRAVENOUS ONCE AS NEEDED
Status: DISCONTINUED | OUTPATIENT
Start: 2019-01-28 | End: 2019-01-28 | Stop reason: HOSPADM

## 2019-01-28 RX ORDER — FENTANYL CITRATE 50 UG/ML
INJECTION, SOLUTION INTRAMUSCULAR; INTRAVENOUS AS NEEDED
Status: DISCONTINUED | OUTPATIENT
Start: 2019-01-28 | End: 2019-01-28 | Stop reason: SURG

## 2019-01-28 RX ORDER — MAGNESIUM HYDROXIDE 1200 MG/15ML
LIQUID ORAL AS NEEDED
Status: DISCONTINUED | OUTPATIENT
Start: 2019-01-28 | End: 2019-01-28 | Stop reason: HOSPADM

## 2019-01-28 RX ORDER — SODIUM CHLORIDE, SODIUM LACTATE, POTASSIUM CHLORIDE, CALCIUM CHLORIDE 600; 310; 30; 20 MG/100ML; MG/100ML; MG/100ML; MG/100ML
125 INJECTION, SOLUTION INTRAVENOUS CONTINUOUS
Status: DISCONTINUED | OUTPATIENT
Start: 2019-01-28 | End: 2019-01-28

## 2019-01-28 RX ORDER — PROPOFOL 10 MG/ML
INJECTION, EMULSION INTRAVENOUS CONTINUOUS PRN
Status: DISCONTINUED | OUTPATIENT
Start: 2019-01-28 | End: 2019-01-28 | Stop reason: SURG

## 2019-01-28 RX ORDER — FENTANYL CITRATE/PF 50 MCG/ML
25 SYRINGE (ML) INJECTION
Status: DISCONTINUED | OUTPATIENT
Start: 2019-01-28 | End: 2019-01-28 | Stop reason: HOSPADM

## 2019-01-28 RX ADMIN — AMPICILLIN SODIUM AND SULBACTAM SODIUM 3 G: 2; 1 INJECTION, POWDER, FOR SOLUTION INTRAMUSCULAR; INTRAVENOUS at 18:55

## 2019-01-28 RX ADMIN — VANCOMYCIN HYDROCHLORIDE 1250 MG: 1 INJECTION, POWDER, LYOPHILIZED, FOR SOLUTION INTRAVENOUS at 07:52

## 2019-01-28 RX ADMIN — AMPICILLIN SODIUM AND SULBACTAM SODIUM 3 G: 2; 1 INJECTION, POWDER, FOR SOLUTION INTRAMUSCULAR; INTRAVENOUS at 05:53

## 2019-01-28 RX ADMIN — ASPIRIN 81 MG 81 MG: 81 TABLET ORAL at 09:42

## 2019-01-28 RX ADMIN — AMPICILLIN SODIUM AND SULBACTAM SODIUM 3 G: 2; 1 INJECTION, POWDER, FOR SOLUTION INTRAMUSCULAR; INTRAVENOUS at 23:27

## 2019-01-28 RX ADMIN — MORPHINE SULFATE 1 MG: 2 INJECTION, SOLUTION INTRAMUSCULAR; INTRAVENOUS at 21:07

## 2019-01-28 RX ADMIN — VITAMIN D, TAB 1000IU (100/BT) 1000 UNITS: 25 TAB at 09:42

## 2019-01-28 RX ADMIN — SODIUM CHLORIDE, POTASSIUM CHLORIDE, SODIUM LACTATE AND CALCIUM CHLORIDE: 600; 310; 30; 20 INJECTION, SOLUTION INTRAVENOUS at 14:20

## 2019-01-28 RX ADMIN — LOSARTAN POTASSIUM 50 MG: 50 TABLET, FILM COATED ORAL at 09:42

## 2019-01-28 RX ADMIN — ATORVASTATIN CALCIUM 40 MG: 40 TABLET, FILM COATED ORAL at 16:23

## 2019-01-28 RX ADMIN — DOCUSATE SODIUM 100 MG: 100 CAPSULE, LIQUID FILLED ORAL at 09:44

## 2019-01-28 RX ADMIN — TIMOLOL MALEATE 1 DROP: 5 SOLUTION OPHTHALMIC at 09:48

## 2019-01-28 RX ADMIN — FENTANYL CITRATE 50 MCG: 50 INJECTION INTRAMUSCULAR; INTRAVENOUS at 14:25

## 2019-01-28 RX ADMIN — FENTANYL CITRATE 50 MCG: 50 INJECTION INTRAMUSCULAR; INTRAVENOUS at 14:29

## 2019-01-28 RX ADMIN — AMPICILLIN SODIUM AND SULBACTAM SODIUM 3 G: 2; 1 INJECTION, POWDER, FOR SOLUTION INTRAMUSCULAR; INTRAVENOUS at 12:45

## 2019-01-28 RX ADMIN — ATROPINE SULFATE 1 DROP: 10 SOLUTION/ DROPS OPHTHALMIC at 09:43

## 2019-01-28 RX ADMIN — PROPOFOL 50 MG: 10 INJECTION, EMULSION INTRAVENOUS at 14:25

## 2019-01-28 RX ADMIN — AMLODIPINE BESYLATE 10 MG: 5 TABLET ORAL at 09:42

## 2019-01-28 RX ADMIN — PROPOFOL 50 MG: 10 INJECTION, EMULSION INTRAVENOUS at 14:30

## 2019-01-28 RX ADMIN — PROPOFOL 120 MCG/KG/MIN: 10 INJECTION, EMULSION INTRAVENOUS at 14:25

## 2019-01-28 RX ADMIN — PREDNISOLONE ACETATE 1 DROP: 10 SUSPENSION/ DROPS OPHTHALMIC at 09:45

## 2019-01-28 RX ADMIN — SODIUM CHLORIDE, POTASSIUM CHLORIDE, SODIUM LACTATE AND CALCIUM CHLORIDE 125 ML/HR: 600; 310; 30; 20 INJECTION, SOLUTION INTRAVENOUS at 14:20

## 2019-01-28 RX ADMIN — TIMOLOL MALEATE 1 DROP: 5 SOLUTION OPHTHALMIC at 19:18

## 2019-01-28 RX ADMIN — NIACIN 1500 MG: 500 TABLET, FILM COATED, EXTENDED RELEASE ORAL at 21:06

## 2019-01-28 NOTE — ASSESSMENT & PLAN NOTE
· Patient had blood loss from surgical site and left groin as well as left foot  · Will continue to monitor

## 2019-01-28 NOTE — ANESTHESIA PREPROCEDURE EVALUATION
Review of Systems/Medical History  Patient summary reviewed  Chart reviewed      Cardiovascular  EKG reviewed, Hyperlipidemia, Hypertension , CAD , CHF ,   Comment: AFIB,  Pulmonary  Shortness of breath,        GI/Hepatic            Endo/Other     GYN       Hematology  Anemia ,     Musculoskeletal    Comment: Left thigh infection and left foot/ankle infection Arthritis     Neurology    CVA ,    Psychology         Lab Results   Component Value Date    WBC 7 60 01/28/2019    HGB 8 7 (L) 01/28/2019    HCT 26 6 (L) 01/28/2019    MCV 87 01/28/2019     01/28/2019     Lab Results   Component Value Date    CALCIUM 8 3 (L) 01/28/2019    K 4 1 01/28/2019    CO2 26 01/28/2019     01/28/2019    BUN 10 01/28/2019    CREATININE 0 83 01/28/2019     Lab Results   Component Value Date    INR 1 26 01/26/2019    INR 1 31 01/25/2019    INR 1 40 01/25/2019    PROTIME 14 7 (H) 01/26/2019    PROTIME 15 2 (H) 01/25/2019    PROTIME 16 3 (H) 01/25/2019     Lab Results   Component Value Date    PTT 48 (H) 01/23/2019       Physical Exam    Airway    Mallampati score: III  TM Distance: >3 FB  Neck ROM: full     Dental   upper dentures,     Cardiovascular  Rhythm: irregular, Cardiovascular exam normal    Pulmonary  Pulmonary exam normal     Other Findings        Anesthesia Plan  ASA Score- 3     Anesthesia Type- IV sedation with anesthesia with ASA Monitors  Additional Monitors:   Airway Plan:     Comment: I personally discussed risks and benefits to this anesthetic  All patient questions were answered  Pt agrees with anesthesia plan        Plan Factors-    Induction- intravenous  Postoperative Plan- Plan for postoperative opioid use  Planned trial extubation    Informed Consent- Anesthetic plan and risks discussed with patient  I personally reviewed this patient with the CRNA  Discussed and agreed on the Anesthesia Plan with the CRNA  Taylor Boateng

## 2019-01-28 NOTE — PLAN OF CARE
Problem: OCCUPATIONAL THERAPY ADULT  Goal: Performs self-care activities at highest level of function for planned discharge setting  See evaluation for individualized goals  Treatment Interventions: Energy conservation, Activityengagement, ADL retraining, Functional transfer training, Endurance training, Patient/family training          See flowsheet documentation for full assessment, interventions and recommendations  Outcome: Progressing  Limitation: Decreased ADL status, Decreased endurance, Decreased self-care trans, Decreased high-level ADLs  Prognosis: Good  Assessment: Pt is a 80 y o  male seen for OT evaluation s/p admit to [facility] on 1/23/2019 w/ Abscess of left groin  Comorbidities affecting pt's functional performance at time of assessment include: HTN and OA, Malignant Melanoma Lt Foot, Blind Rt Eye, CHF, Anemia, Cellulitis LLE  Personal factors affecting pt at time of IE include:steps to enter environment, limited home support, difficulty performing ADLS and difficulty performing IADLS   Prior to admission, pt was I with all self care ADL's, IADL's, Ambulatory w/ SPC (recently)  Nancy Foster Upon evaluation: Pt requires an increased level of assistance with self care ADL's and functional transfers/toileting/mobility r/t the following deficits impacting occupational performance: weakness, decreased balance, decreased tolerance, decreased safety awareness and current medical issues  Pt to benefit from continued skilled OT tx while in the hospital to address deficits as defined above and maximize level of functional independence w ADL's and functional mobility  Occupational Performance areas to address include: bathing/shower, dressing and functional mobility  From OT standpoint, recommendation at time of d/c would be depending upon status post op/will re-assess as indicated         OT Discharge Recommendation:  (tbd POST OP)  OT - OK to Discharge: No  Courtney Diaz, OT

## 2019-01-28 NOTE — PROGRESS NOTES
Progress Note - Manny Malhotra 3/5/1931, 80 y o  male MRN: [id number]    Unit/Bed#: -02 Encounter: [id number]    Primary Care Provider: Aditya Jama MD   Date and time admitted to hospital: 1/23/2019  3:57 PM        Acute blood loss anemia   Assessment & Plan    · Patient had blood loss from surgical site and left groin as well as left foot  · Will continue to monitor     Glaucoma of right eye   Assessment & Plan    · Chronic and stable  · Continue eyedrops     Groin pain   Assessment & Plan    · Monitor for pain     Chronic atrial fibrillation (HCC)   Assessment & Plan    · Chronic and stable  · Coumadin held due to surgery that was done on 01/25/2019 and patient did have a significant amount of bleeding from the groin site  · Patient received 2 units of FFP and vitamin K prior to surgery and also FFP/vitamin K after surgery due to postop bleeding  · Will discuss with surgery regarding when safe to resume Coumadin     Coagulopathy ([facility] [address] )   Assessment & Plan    · Will hold Coumadin for now  · Monitor INR     Essential hypertension   Assessment & Plan    · Chronic and stable  · Continue Norvasc     Malignant melanoma of left foot ([facility] [address] )   Assessment & Plan    · This is a chronic history  · However the patient does have an area on his left foot with necrosis verses eschar  · Patient is status post surgical debridement in the OR with Podiatry on 01/25/2019  · Continue wound VAC       * Abscess of left groin   Assessment & Plan    · Status post I and D in the OR with surgical team on 01/25/2019  · Continue both vancomycin and Unasyn for now, will ask ID regarding their recommendations  · Patient will be going back to the OR today for packing change  · Follow up wound cultures  · Trend procalcitonin  · Pain control as needed         VTE Pharmacologic Prophylaxis: Pharmacologic: Warfarin (Coumadin)    Patient Centered Rounds: I have performed bedside rounds with nursing staff today      Discussions with Specialists or Other Care Team Provider: n/a  Education and Discussions with Family / Patient: patient    Time Spent for Care: 20 minutes  More than 50% of total time spent on counseling and coordination of care as described above  Current Length of Stay: 5 day(s)    Current Patient Status: Inpatient   Certification Statement: The patient will continue to require additional inpatient hospital stay due to wound abscess    Discharge Plan: pending hospital course    Code Status: Level 3 - DNAR and DNI    Subjective:   Patient seen examined  No acute events were noted  Feels much better today    Objective:     Vitals:   Temp (24hrs), Av 3 °F (37 4 °C), Min:98 7 °F (37 1 °C), Max:99 6 °F (37 6 °C)    Temp:  [98 7 °F (37 1 °C)-99 6 °F (37 6 °C)] 99 6 °F (37 6 °C)  HR:  [74-86] 86  Resp:  [18] 18  BP: (103-116)/(60-68) 116/60  SpO2:  [94 %-95 %] 94 %  Body mass index is 26 63 kg/m²  Input and Output Summary (last 24 hours): Intake/Output Summary (Last 24 hours) at 19 1125  Last data filed at 19 2217   Gross per 24 hour   Intake              240 ml   Output              375 ml   Net             -135 ml       Physical Exam:     Physical Exam   Constitutional: He is oriented to person, place, and time  He appears well-developed and well-nourished  HENT:   Head: Normocephalic and atraumatic  Eyes: Pupils are equal, round, and reactive to light  EOM are normal    Neck: Normal range of motion  Neck supple  No JVD present  Cardiovascular: Normal rate, regular rhythm and normal heart sounds  Pulmonary/Chest: Effort normal and breath sounds normal  No respiratory distress  Abdominal: Soft  Bowel sounds are normal  He exhibits no distension  There is no tenderness  Musculoskeletal: Normal range of motion  He exhibits no edema  Left groin wound is dressed appearing clean dry and intact  Foot wound VAC in place   Neurological: He is alert and oriented to person, place, and time     Skin: Skin is warm  No erythema  Psychiatric: He has a normal mood and affect  His behavior is normal  Thought content normal    Nursing note and vitals reviewed  Additional Data:     Labs:      Results from last 7 days  Lab Units 01/28/19  0609   WBC Thousand/uL 7 60   HEMOGLOBIN g/dL 8 7*   HEMATOCRIT % 26 6*   PLATELETS Thousands/uL 288   NEUTROS PCT % 72   LYMPHS PCT % 17*   MONOS PCT % 6   EOS PCT % 5       Results from last 7 days  Lab Units 01/28/19  0609  01/24/19  0524   POTASSIUM mmol/L 4 1  < > 3 9   CHLORIDE mmol/L 105  < > 103   CO2 mmol/L 26  < > 25   BUN mg/dL 10  < > 19   CREATININE mg/dL 0 83  < > 1 10   CALCIUM mg/dL 8 3*  < > 8 3*   ALK PHOS U/L  --   --  74   ALT U/L  --   --  47   AST U/L  --   --  42*   < > = values in this interval not displayed  Results from last 7 days  Lab Units 01/26/19  0606   INR  1 26               * I Have Reviewed All Lab Data Listed Above  * Additional Pertinent Lab Tests Reviewed: Jenni [facility] Admission  Reviewed    Imaging:  Imaging Reports Reviewed Today Include: n/a    Recent Cultures (last 7 days):       Results from last 7 days  Lab Units 01/25/19  1116 01/23/19  1648   BLOOD CULTURE   --  No Growth After 4 Days  No Growth After 4 Days     GRAM STAIN RESULT  2+ Polys  2+ Gram positive cocci in pairs  1+ Gram positive cocci in pairs and chains  Rare Gram negative rods  --    WOUND CULTURE  3+ Growth of Beta Hemolytic Streptococcus Group B*  Growth in Broth culture only Serratia marcescens*  --        Last 24 Hours Medication List:     Current Facility-Administered Medications:  acetaminophen 650 mg Oral Q4H PRN Alison Hartmann MD    amLODIPine 10 mg Oral Daily TRACE Simmons    ampicillin-sulbactam 3 g Intravenous Q6H TRACE Simmons Last Rate: 3 g (01/28/19 0553)   aspirin 81 mg Oral Daily TRACE Simmons    atorvastatin 40 mg Oral Daily With ChesterTRACE Petit    atropine 1 drop Right Eye Daily TRACE Simmons    cholecalciferol 1,000 Units Oral Daily TRACE Simmons    docusate sodium 100 mg Oral BID Suma Chowdhury MD    losartan 50 mg Oral Daily TRACE Simmons    morphine injection 1 mg Intravenous Q4H PRN Andrey Villarreal PA-C    niacin 1,500 mg Oral HS TRACE Simmons    ondansetron 4 mg Intravenous Q6H PRN TRACE Simmons    oxyCODONE 5 mg Oral Q4H PRN Suma Chowdhury MD    potassium chloride 20 mEq Intravenous Once Luis Crook MD    prednisoLONE acetate 1 drop Right Eye Daily TRACE Simmons    timolol 1 drop Left Eye BID TRACE Simmons    vancomycin 15 mg/kg Intravenous Q12H TRACE Simmons Last Rate: 1,250 mg (01/28/19 0752)        Today, Patient Was Seen By: Devika Sánchez MD    ** Please Note: Dictation voice to text software may have been used in the creation of this document   **

## 2019-01-28 NOTE — PLAN OF CARE
Problem: PHYSICAL THERAPY ADULT  Goal: Performs mobility at highest level of function for planned discharge setting  See evaluation for individualized goals  Treatment/Interventions: Functional transfer training, LE strengthening/ROM, Therapeutic exercise, Endurance training, Bed mobility, Gait training, Equipment eval/education  Equipment Recommended: Other (Comment) (TBD w/ functional progression as safely tolerated)  See flowsheet documentation for full assessment, interventions and recommendations  Seema Mueller, PT    Prognosis: Good  Problem List: Decreased strength, Decreased endurance, Impaired balance, Decreased mobility, Decreased safety awareness, Impaired vision, Decreased skin integrity  Assessment: Pt is 80 y o  male seen for PT evaluation s/p admit to [facility] on 1/23/2019 w/ Abscess of left groin  PT consulted to assess pt's functional mobility and d/c needs  Order placed for PT eval and tx, w/ activity as tolerated order  Comorbidities affecting pt's physical performance at time of assessment include: weakness, blind R eye, L foot malignant melanoma, HTN, CHF, anemia, LLE cellulitis, groin pain  PTA, pt was independent w/ all functional mobility w/ SPC  Personal factors affecting pt at time of IE include: ambulating w/ assistive device, inability to ambulate household distances, inability to navigate level surfaces w/o external assistance, unable to perform dynamic tasks in community, unable to perform physical activity, inability to perform IADLs, inability to perform ADLs and inability to live alone  Please find objective findings from PT assessment regarding body systems outlined above with impairments and limitations including weakness, impaired balance, decreased endurance, gait deviations, decreased activity tolerance, altered sensation, fall risk and decreased skin integrity  The following objective measures performed on IE also reveal limitations: Barthel Index: 40/100  Pt's clinical presentation is currently unstable/unpredictable  Pt to benefit from continued PT tx to address deficits as defined above and maximize level of functional independent mobility and consistency  From PT/mobility standpoint, recommendation at time of d/c would be await further surgical interventions, defer at this time pending progress in order to facilitate return to PLOF  Recommendation: Defer at this time (monitor functional status following surgical interventions)     PT - OK to Discharge: No    See flowsheet documentation for full assessment     Lucia Noyola, PT

## 2019-01-28 NOTE — UTILIZATION REVIEW
Continued Stay Review    Date:      For  1/27/2019    Vital Signs: /68 (BP Location: Right arm)   Pulse 83   Temp (!) 97 2 °F (36 2 °C) (Temporal)   Resp 18   Ht 5' 7" (1 702 m)   Wt 77 1 kg (170 lb)   SpO2 97%   BMI 26 63 kg/m²        Assessment/Plan:   Abscess of left groin   Assessment & Plan     · Status post I and D in the OR with surgical team on 01/25/2019  · Continue both vancomycin and Unasyn  · Patient will be going back to the OR tomorrow for packing change  · Follow up wound cultures  · Trend procalcitonin  · Pain control as needed         Malignant melanoma of left foot [facility])   Assessment & Plan     · This is a chronic history  · However the patient does have an area on his left foot with necrosis verses eschar  · Patient is status post surgical debridement in the OR with Podiatry on 01/25/2019  · Continue wound VAC     Acute blood loss anemia   Assessment & Plan     · Patient had blood loss from surgical site and left groin as well as left foot  · Will continue to monitor  · Patient did receive FFP and vitamin K to reverse patient's coagulopathy  · Will transfuse as needed      Chronic atrial fibrillation (HCC)   Assessment & Plan     · Chronic and stable  · Coumadin held due to surgery that was done on 01/25/2019 and patient did have a significant amount of bleeding from the groin site  · Patient received 2 units of FFP and vitamin K prior to surgery and also FFP/vitamin K after surgery due to postop bleeding  · Will discuss with surgery regarding when safe to resume Coumadin      Hyperlipidemia   Assessment & Plan     · Chronic and stable  · Continue niacin        Essential hypertension   Assessment & Plan     · Chronic and stable  · Continue Norvasc     The patient will continue to require additional inpatient hospital stay due to Left groin abscess  Patient states he feels better today  Pain controlled  No nausea or vomiting  Left foot wound VAC working appropriately    Wound VAC for left groin wound has been held due to postoperative bleeding patient will go to OR tomorrow for packing change      Medications:   Scheduled Meds:   Current Facility-Administered Medications:  acetaminophen 650 mg Oral Q4H PRN Kaylan Genao MD    amLODIPine 10 mg Oral Daily TRACE Simmons    ampicillin-sulbactam 3 g Intravenous Q6H TRACE Simmons Last Rate: 3 g (01/28/19 1245)   aspirin 81 mg Oral Daily TRACE Simmons    atorvastatin 40 mg Oral Daily With [employer], TRACE    atropine 1 drop Right Eye Daily TRACE Simmons    cholecalciferol 1,000 Units Oral Daily TRACE Simmons    docusate sodium 100 mg Oral BID Kaylan Genao MD    losartan 50 mg Oral Daily TRACE Simmons    morphine injection 1 mg Intravenous Q4H PRN Shilpi Draper PA-C    niacin 1,500 mg Oral HS TRACE Simmons    ondansetron 4 mg Intravenous Q6H PRN TRACE Simmons    oxyCODONE 5 mg Oral Q4H PRN Kaylan Genao MD    potassium chloride 20 mEq Intravenous Once Dyan Betancourt MD    prednisoLONE acetate 1 drop Right Eye Daily TRACE Simmons    timolol 1 drop Left Eye BID TRACE Simmons      Continuous Infusions:    PRN Meds:   acetaminophen    morphine injection    ondansetron    oxyCODONE     Pertinent Labs/Diagnostic Results:   H/H   8 5/25 4    Discharge Plan:   TBD

## 2019-01-28 NOTE — CONSULTS
The patients vancomycin therapy has been discontinued  Thank you for the consult  Pharmacy will sign off now

## 2019-01-28 NOTE — ASSESSMENT & PLAN NOTE
· Status post I and D in the OR with surgical team on 01/25/2019  · Continue both vancomycin and Unasyn for now, will ask ID regarding their recommendations  · Patient will be going back to the OR today for packing change  · Follow up wound cultures  · Trend procalcitonin  · Pain control as needed

## 2019-01-28 NOTE — PROGRESS NOTES
Vancomycin IV Pharmacy-to-Dose Consultation    Steve Carrizales is a 80 y o  male who is currently receiving Vancomycin IV with management by the Pharmacy Consult service  Assessment/Plan:  The patient was reviewed  Renal function is stable and no signs or symptoms of nephrotoxicity and/or infusion reactions were documented in the chart  Based on todays assessment, continue current vancomycin (day # 6) dosing of 1250 mg iv q 12 hours, with a plan for trough to be drawn at 0730 on 1/29/19  We will continue to follow the patients culture results and clinical progress daily      Liane Staples, Pharmacist

## 2019-01-28 NOTE — SOCIAL WORK
Chart reviewed by case management , pt went to the or today, wound vac rx was given to the doctor to complete so a wound vac can be ordered, cm will need to reassess pt, pt may need snf, d/c plan was discussed at care coordination rounds today

## 2019-01-28 NOTE — OCCUPATIONAL THERAPY NOTE
Occupational Therapy Evaluation      Chago Pinto    1/28/2019    Patient Active Problem List   Diagnosis    Malignant melanoma of left foot ([address] )    Blind right eye    [facility]brovascular accident ([facility] [address] [facility]    Cholelithiasis without obstruction    Coronary arteriosclerosis in native artery    Essential hypertension    Hyperlipidemia    Idiopathic progressive polyneuropathy    Neovascular glaucoma of left eye    Osteoarthritis    Osteoporosis    Overweight    Vitamin D deficiency    Congestive heart failure (CHF) (HCC)    Coagulopathy (HCC)    Chronic atrial fibrillation (HCC)    Groin pain    Abscess of left groin    Glaucoma of right eye    Leukemoid reaction    Acute blood loss anemia    Cellulitis and abscess of left lower extremity       Past Medical History:   Diagnosis Date    Anesthesia     "blood pressure went down and than had a blood transfusion after hip replacement"    Anticoagulated on Coumadin     Arthritis     Bleeding risk due to Coumadin and aspirin     Blind right eye     Bruises easily     Bulging lumbar disc     Cancer ([facility] [address] )     LT foot/melanoma    Coronary artery disease     Foot pain     "nerve pains to left foot sometimes"    Glaucoma     right eye    History of coronary artery stent placement     x2 "in the 1990's"    History of left hip replacement     History of transfusion     "i think with left hip replacement about 8 yrs ago, think it was my own blood"    Hyperlipidemia     Hypertension     Irregular heart beat     afib    Left shoulder pain     Low back pain     Osteopenia     Risk for falls     S/P CABG x 1 1974    Seasonal allergies     Shortness of breath     Teeth missing     lower    Use of cane as ambulatory aid     Wears dentures     upper       Past Surgical History:   Procedure Laterality Date   Ardeth Needs CARDIAC SURGERY      1974 and 08449'R    CATARACT EXTRACTION Bilateral     COLONOSCOPY      CORONARY ARTERY BYPASS GRAFT      CORONARY STENT PLACEMENT  1990's    pt has two coronary stents    INCISION AND DRAINAGE OF WOUND Left 1/25/2019    Procedure: INCISION AND DRAINAGE (I&D) EXTREMITY;  Surgeon: Carlos Anaya MD;  Location: [facility] MAIN OR;  Service: General    JOINT REPLACEMENT Left     THR    LYMPH NODE BIOPSY Left 12/3/2018    Procedure: SENTINEL NODE BX;  Surgeon: Dneice Flores MD;  [facility];  Service: Surgical Oncology    TN ADJ TISS XFER SCALP,EXTREM <10 SQCM Left 12/3/2018    Procedure: LOCAL FLAP, SKIN GRAFT;  Surgeon: Jannet Bautista MD;  [facility];  Service: Plastics    TN KNEE SCOPE,MED/LAT MENISECTOMY Left 8/15/2018    Procedure: left KNEE ARTHROSCOPY with medial & lateral menisectomy, chondroplasty,synovectomy & injection;  Surgeon: Layne Mann DO;  Location: [facility] MAIN OR;  Service: Orthopedics    SKIN BIOPSY      SKIN LESION EXCISION Left 12/3/2018    Procedure: LYMPHOSCINTIGRAPHY, INTRAOP LYMPHATIC MAPPING , SENT NODE BIOPSY, WIDE EXCISION MELANOMA SCAR LEFT LAT  DORSAL FOOT;  Surgeon: Denice Flores MD;  [facility];  Service: Surgical Oncology    WISDOM TOOTH EXTRACTION      WOUND DEBRIDEMENT Left 1/25/2019    Procedure: EXCISIONAL DEBRIDEMENT;  Surgeon: Carlos Anaya MD;  Location: [facility] MAIN OR;  Service: General      01/28/19 0914   Note Type   Note type Eval/Treat   Restrictions/Precautions   Weight Bearing Precautions Per Order No   Other Precautions Fall Risk;Visual impairment  (Rt eye blind, Lt heel wound vac)   Pain Assessment   Pain Assessment No/denies pain   Pain Score No Pain   Home Living   Type of Home House   Home Layout Two level; Able to live on main level with bedroom/bathroom  (1 OSMAR)   Bathroom Shower/Tub Tub/shower unit  (with doors)   [address] [place] [address]  chair   Bathroom Accessibility Accessible   Home Equipment Cane;Walker;Reacher   Additional Comments SPC used recently   Prior Function   Level of [place] Independent with ADLs and functional mobility   Lives With Alone   ADL Assistance Independent   IADLs Independent   Falls in the last 6 months 0   Vocational Retired   Psychosocial   Psychosocial (WDL) WDL   Subjective   Subjective I'm going in at 11 to the OR   I hope that'll do it (pt positive)   ADL   Eating Assistance 7  Independent  (NPO this am)   Grooming Assistance 5  Supervision/Setup   UB Bathing Assistance 4  Minimal Assistance   LB Bathing Assistance Unable to assess   UB Dressing Assistance 4  Minimal Assistance    [address] Unable to assess   LB Dressing Deficit (Lt groin/thigh wound/Rt heel wound vac)   Transfers   Sit to Stand 4  Minimal assistance   Additional items Assist x 1;Bedrails; Impulsive;Verbal cues   Stand to Sit 4  Minimal assistance   Additional items Assist x 1;Bedrails   Stand pivot 4  Minimal assistance   Additional items Assist x 1;Bedrails;Verbal cues   Toilet transfer 4  Minimal assistance   Additional items Assist x 1;Verbal cues   Additional Comments assisted pt back to bed following BSC use, limited r/t wound vac and additional wound (reason for OR today)   Functional Mobility   Functional Mobility (unable to assess)   Balance   Static Sitting Good   Dynamic Sitting Fair +   Static Standing Fair +   Dynamic Standing Fair +   Activity Tolerance   Activity Tolerance Patient limited by fatigue;Treatment limited secondary to medical complications (Comment)  (presence of wound vac)   Nurse Made Aware yes   RUE Assessment   RUE Assessment WFL   LUE Assessment   LUE Assessment WFL   Hand Function   Gross Motor Coordination Functional   Fine Motor Coordination Functional   Cognition   Overall Cognitive Status WFL   Arousal/Participation Alert; Cooperative   Attention Within functional limits   Orientation Level Oriented X4   Memory Within functional limits   Following Commands Follows one step commands without difficulty   Assessment   Limitation Decreased ADL status; Decreased endurance;Decreased self-care trans;Decreased high-level ADLs   Prognosis Good   Assessment Pt is a 80 y o  male seen for OT evaluation s/p admit to [facility] on 1/23/2019 w/ Abscess of left groin  Comorbidities affecting pt's functional performance at time of assessment include: HTN and OA, Malignant Melanoma Lt Foot, Blind Rt Eye, CHF, Anemia, Cellulitis LLE  Personal factors affecting pt at time of IE include:steps to enter environment, limited home support, difficulty performing ADLS and difficulty performing IADLS   Prior to admission, pt was I with all self care ADL's, IADL's, Ambulatory w/ SPC (recently)  Nancy Foster Upon evaluation: Pt requires an increased level of assistance with self care ADL's and functional transfers/toileting/mobility r/t the following deficits impacting occupational performance: weakness, decreased balance, decreased tolerance, decreased safety awareness and current medical issues  Pt to benefit from continued skilled OT tx while in the hospital to address deficits as defined above and maximize level of functional independence w ADL's and functional mobility  Occupational Performance areas to address include: bathing/shower, dressing and functional mobility  From OT standpoint, recommendation at time of d/c would be depending upon status post op/will re-assess as indicated  Goals   Patient Goals to have these (wounds) taken care of      STG Time Frame 3-5   Short Term Goal #1 increase knowledge re: adaptations to increase level of safety and independence with self care ADL's   Short Term Goal #2 increase bed mobility to MI, with good safety, to prepare for self care ADL's   LTG Time Frame 7-10   Long Term Goal #1 increase self care ADL's to MI   Long Term Goal #2 increase self toileting to MI with good safety   Long Term Goal assess further needs post op   Plan   Treatment Interventions Energy conservation; Activityengagement;ADL retraining;Functional transfer training; Endurance training;Patient/family training   Goal Expiration Date 02/07/19   Treatment Day 0   OT Frequency 3-5x/wk   Recommendation   OT Discharge Recommendation (tbd POST OP)   OT - OK to Discharge No   Barthel Index   Feeding 5   Bathing 0   Grooming Score 5   Dressing Score 5   Bladder Score 5   Bowels Score 5   Toilet Use Score 5   Transfers (Bed/Chair) Score 10   Mobility (Level Surface) Score 0   Stairs Score 0   Barthel Index Score 40   Criselda Mcfadden, OT

## 2019-01-28 NOTE — PHYSICAL THERAPY NOTE
Physical Therapy Evaluation     Patient's Name: Kwesi Wilkins    Admitting Diagnosis  Lymphadenitis [I88 9]  Groin pain [R10 30]  Cellulitis and abscess of left lower extremity [L03 116, L02 416]    Problem List  Patient Active Problem List   Diagnosis    Malignant melanoma of left foot ([address]    Blind right eye    [facility] [address] [facility]    Cholelithiasis without obstruction    Coronary arteriosclerosis in native artery    Essential hypertension    Hyperlipidemia    Idiopathic progressive polyneuropathy    Neovascular glaucoma of left eye    Osteoarthritis    Osteoporosis    Overweight    Vitamin D deficiency    Congestive heart failure (CHF) (HCC)    Coagulopathy (HCC)    Chronic atrial fibrillation (HCC)    Groin pain    Abscess of left groin    Glaucoma of right eye    Leukemoid reaction    Acute blood loss anemia    Cellulitis and abscess of left lower extremity       Past Medical History  Past Medical History:   Diagnosis Date    Anesthesia     "blood pressure went down and than had a blood transfusion after hip replacement"    Anticoagulated on Coumadin     Arthritis     Bleeding risk due to Coumadin and aspirin     Blind right eye     Bruises easily     Bulging lumbar disc     Cancer (HCC)     LT foot/melanoma    Coronary artery disease     Foot pain     "nerve pains to left foot sometimes"    Glaucoma     right eye    History of coronary artery stent placement     x2 "in the 1990's"    History of left hip replacement     History of transfusion     "i think with left hip replacement about 8 yrs ago, think it was my own blood"    Hyperlipidemia     Hypertension     Irregular heart beat     afib    Left shoulder pain     Low back pain     Osteopenia     Risk for falls     S/P CABG x 1 1974    Seasonal allergies     Shortness of breath     Teeth missing     lower    Use of cane as ambulatory aid     Wears dentures     upper       Past Surgical History  Past Surgical History:   Procedure Laterality Date   [place] CARDIAC SURGERY      1585 and 92944'B    CATARACT EXTRACTION Bilateral     COLONOSCOPY      CORONARY ARTERY BYPASS GRAFT      CORONARY STENT PLACEMENT  1990's    pt has two coronary stents    INCISION AND DRAINAGE OF WOUND Left 1/25/2019    Procedure: INCISION AND DRAINAGE (I&D) EXTREMITY;  Surgeon: Yamila Redman MD;  Location: [facility] MAIN OR;  Service: General    JOINT REPLACEMENT Left     THR    LYMPH NODE BIOPSY Left 12/3/2018    Procedure: SENTINEL NODE BX;  Surgeon: Vahid Schafer MD;  [facility];  Service: Surgical Oncology    MO ADJ TISS XFER SCALP,EXTREM <10 SQCM Left 12/3/2018    Procedure: LOCAL FLAP, SKIN GRAFT;  Surgeon: Johnathon Colvin MD;  [facility];  Service: Plastics    MO KNEE SCOPE,MED/LAT MENISECTOMY Left 8/15/2018    Procedure: left KNEE ARTHROSCOPY with medial & lateral menisectomy, chondroplasty,synovectomy & injection;  Surgeon: Mira Mackay DO;  Location[facility] MAIN OR;  Service: Orthopedics    SKIN BIOPSY      SKIN LESION EXCISION Left 12/3/2018    Procedure: LYMPHOSCINTIGRAPHY, INTRAOP LYMPHATIC MAPPING , SENT NODE BIOPSY, WIDE EXCISION MELANOMA SCAR LEFT LAT  DORSAL FOOT;  Surgeon: Vahid Schafer MD;  [facility];  Service: Surgical Oncology    WISDOM TOOTH EXTRACTION      WOUND DEBRIDEMENT Left 1/25/2019    Procedure: EXCISIONAL DEBRIDEMENT;  Surgeon: Yamila Redman MD;  Location: [facility] [address] MAIN OR;  Service: General      01/28/19 0915   Note Type   Note type Eval only   Pain Assessment   Pain Assessment No/denies pain   Pain Score No Pain   Home Living   Type of [address] [place] [address] One level; Access; Performs ADLs on one level; Able to live on main level with bedroom/bathroom   Bathroom Shower/Tub Tub/shower unit   Bathroom Toilet Standard   Bathroom Accessibility [facility] typically)   Prior Function   Level of [place] Independent with ADLs and functional mobility   Lives With Alone   ADL Assistance Independent   IADLs Independent   Falls in the last 6 months 0   Restrictions/Precautions   Weight Bearing Precautions Per Order No   Other Precautions Fall Risk;Visual impairment  (blind R eye, wound vac L heel)   General   Family/Caregiver Present No   Cognition   Overall Cognitive Status WFL   Arousal/Participation Alert   Orientation Level Oriented X4   Memory Within functional limits   Following Commands Follows one step commands without difficulty   RUE Assessment   RUE Assessment (see OT assessment)   LUE Assessment   LUE Assessment (see OT assessment)   RLE Assessment   RLE Assessment X   Strength RLE   R Hip Flexion 3/5   R Knee Extension 3/5   R Ankle Dorsiflexion 3/5   LLE Assessment   LLE Assessment X   Strength LLE   L Hip Flexion 3/5   L Knee Extension 3/5   L Ankle Dorsiflexion 3/5   Coordination   Movements are Fluid and Coordinated 1   Negative Pressure Wound Therapy (V A C ) Foot Other (Comment)   Placement Date/Time: 01/25/19 1110   Inserted by: Dr Marcelo Singletary  Wound Type: (c) Other (Comment)  Location: Foot  Wound Location Orientation: (c) Other (Comment)  Dressing Status: Clean;Dry; Intact   Black foam- # applied 1   Nonadherent (Adaptic)- # applied 1   Cycle Intermittent   Target Pressure (mmHg) 125   Canister Changed No   Dressing Status Clean;Dry; Intact   Incision 01/25/19 Thigh Left   Date First Assessed/Time First Assessed: 01/25/19 1128   Location: Thigh  Wound Location Orientation: Left  Wound Description (Comments):  wound vac    Incision Description NANCI   Dressing Dry dressing   Dressing Status Intact; Old drainage  (shadowed through dressing)   Bed Mobility   Additional Comments Upon arrival, patient was resting on bedside  Transfers   Sit to Stand 4  Minimal assistance   Additional items Assist x 1;Bedrails; Impulsive   Stand to Sit 4  Minimal assistance   Additional items Assist x 1;Verbal cues   Stand pivot 4  Minimal assistance Additional items Assist x 1;Verbal cues  (pivot turn from standing-> pivot onto bed)   Additional Comments Further functional task assessment was not complete 2/2 wound vac L heel   Balance   Static Sitting Good   Dynamic Sitting Fair +   Static Standing Fair +   Dynamic Standing Fair +   Endurance Deficit   Endurance Deficit Yes   Endurance Deficit Description fatigue present w/ tasks RPE: 5   Activity Tolerance   Activity Tolerance Patient limited by fatigue;Treatment limited secondary to medical complications (Comment)  (L heel wound vac)   Nurse Made Aware yes   Assessment   Prognosis Good   Problem List Decreased strength;Decreased endurance; Impaired balance;Decreased mobility; Decreased safety awareness; Impaired vision;Decreased skin integrity   Assessment Pt is 80 y o  male seen for PT evaluation s/p admit to [facility] on 1/23/2019 w/ Abscess of left groin  PT consulted to assess pt's functional mobility and d/c needs  Order placed for PT eval and tx, w/ activity as tolerated order  Comorbidities affecting pt's physical performance at time of assessment include: weakness, blind R eye, L foot malignant melanoma, HTN, CHF, anemia, LLE cellulitis, groin pain  PTA, pt was independent w/ all functional mobility w/ SPC  Personal factors affecting pt at time of IE include: ambulating w/ assistive device, inability to ambulate household distances, inability to navigate level surfaces w/o external assistance, unable to perform dynamic tasks in community, unable to perform physical activity, inability to perform IADLs, inability to perform ADLs and inability to live alone  Please find objective findings from PT assessment regarding body systems outlined above with impairments and limitations including weakness, impaired balance, decreased endurance, gait deviations, decreased activity tolerance, altered sensation, fall risk and decreased skin integrity   The following objective measures performed on IE also reveal limitations: Barthel Index: 40/100  Pt's clinical presentation is currently unstable/unpredictable  Pt to benefit from continued PT tx to address deficits as defined above and maximize level of functional independent mobility and consistency  From PT/mobility standpoint, recommendation at time of d/c would be await further surgical interventions, defer at this time pending progress in order to facilitate return to PLOF  Goals   Patient Goals have his wounds addressed   LTG Expiration Date 02/04/19   Long Term Goal #1 1 )Patient will complete bed mobility independently for decrease need for caregiver assistance, decrease burden of care  2 ) Patient will complete transfers independently to decrease risk of falls, facilitate upright standing posture  3 ) BLE strength to greater than/equal to 4-/5 gross musculature to increase ability to safely transfer, control descent to chair  4 ) Patient will exhibit increase dynamic standing balance to Good, for 2-3 minutes without LOB and supervision of 1 to improve activity tolerance  5 ) Patient will exhibit increase dynamic ambulatory balance to Fair+ for 100 feet w/ RW and min A of 1 to improve ability to mobilize to toilet, chair and decrease risk for additional medical complications  6 ) Patient will exhibit good self monitoring and ability to follow 2 step commands to increase complexity of tasks and resume ADL's without LOB  Treatment Day 0   Plan   Treatment/Interventions Functional transfer training;LE strengthening/ROM; Therapeutic exercise; Endurance training;Bed mobility;Gait training;Equipment eval/education   PT Frequency 5x/wk   Recommendation   Recommendation Defer at this time  (monitor functional status following surgical interventions)   Equipment Recommended Other (Comment)  (TBD w/ functional progression as safely tolerated)   PT - OK to Discharge No   Additional Comments Upon conclusion, patient was resting in bed w/ all needs met     Barthel Index   Feeding 5   Bathing 0   Grooming Score 5   Dressing Score 5   Bladder Score 5   Bowels Score 5   Toilet Use Score 5   Transfers (Bed/Chair) Score 10   Mobility (Level Surface) Score 0   Stairs Score 0   Barthel Index Score 40     Wanda Ibarra, PT

## 2019-01-28 NOTE — ANESTHESIA POSTPROCEDURE EVALUATION
Post-Op Assessment Note      CV Status:  Stable    Mental Status:  Alert    Hydration Status:  Stable    PONV Controlled:  None    Airway Patency:  Patent    Post Op Vitals Reviewed: Yes          Staff: CRNA           BP   103/57   Temp   99 2   Pulse  78   Resp   28   SpO2   96% RA

## 2019-01-28 NOTE — OP NOTE
OPERATIVE REPORT  PATIENT NAME: Noah Christiansen    :  3/5/1931  MRN: [id number]  Pt Location: [facility] OR ROOM 02    SURGERY DATE: 2019    Surgeon(s) and Role:     * Hong Vaz PA-C - Primary    Preop Diagnosis:  Cellulitis and abscess of left lower extremity [L03 116, L02 416]    Post-Op Diagnosis Codes:     * Cellulitis and abscess of left lower extremity [L03 116, L02 416]    Procedure(s) (LRB):  CHANGE DRESSING/VAC EXTREMITY (Left)    Specimen(s):  * No specimens in log *    Estimated Blood Loss:   Minimal    Drains:  Negative Pressure Wound Therapy (V A C ) Foot Other (Comment) (Active)   Wound Vac change time out performed? Yes 2019  1:30 PM   Time Out conducted with? dr Edward Paige 2019  1:30 PM   Black foam- # applied 1 2019  9:15 AM   Nonadherent (Adaptic)- # applied 1 2019  9:15 AM   Cycle Intermittent 2019  9:15 AM   Target Pressure (mmHg) 125 2019  9:15 AM   Canister Changed No 2019  9:15 AM   Dressing Status Clean;Dry; Intact 2019  9:15 AM   Number of days: 3       Anesthesia Type:   Conscious Sedation     Operative Indications:  Cellulitis and abscess of left lower extremity [L03 116, L02 416]  Pleasant 81 yo M s/p I&D of infected left groin hematoma with severe groin pain for which packing change and application of a wound vac in the OR is indicated  Operative Findings:  Patient found to have good bed of granulation tissue  Minimal oozing blood noted from skin edges  No purulence  Induration of adjacent skin edges noted  Complications:   None    Procedure and Technique:  Patient properly identified and positioned supine  Skin prepped in drape using sterile technique  Time out performed  Anesthetized with sedation and 10cc 0 5% marcaine  Packing removed  Wound irrigated with 200 cc water  Inspected and hemostasis achieved with Bovie cautery  Re-packed with wound vac white foam at base and then black foam between skin edges   Dressing applied, VAC turned on, found to have good seal with low leak        Patient Disposition:  PACU     SIGNATURE: Chava Vaz PA-C  DATE: January 28, 2019  TIME: 2:53 PM

## 2019-01-28 NOTE — PROGRESS NOTES
Progress Note - Wound   Chago Pinto 80 y o  male MRN: [id number]  Unit/Bed#: -02 Encounter: [id number]      Assessment:   Surgical wound left foot      Plan:   Wound VAC change at 125 mm Hg with black foam   Will change dressing in 2 days if patient remains in-house  Patient is otherwise anticipated for follow-up in the Wound Clinic this week  Subjective:  Patient was seen this a m  For wound VAC dressing change  He is status post day 3 sharp debridement of left foot wound through the subcutaneous layer  Patient is resting comfortably with no complaints of pain  Objective:    Wound VAC dressing change intact at 125 mm Hg no leaks  Vitals: Blood pressure 103/60, pulse 74, temperature 99 5 °F (37 5 °C), temperature source Temporal, resp  rate 18, height 5' 7" (1 702 m), weight 77 1 kg (170 lb), SpO2 94 %  ,Body mass index is 26 63 kg/m²  Physical Exam:  Upon removal of wound VAC dressing wound base appears 75% granular measuring 4 x 5 2 x 0 1 cm in diameter  No purulence no malodor no clinical signs of infection  No kerwin wound erythema no edema  Lab, Imaging and other studies: I have personally reviewed pertinent reports  Negative Pressure Wound Therapy (V A C ) Foot Other (Comment) (Active)   Wound Vac change time out performed? Yes 1/26/2019  1:30 PM   Time Out conducted with? dr Rodríguez Peres 1/26/2019  1:30 PM   Black foam- # applied 1 1/26/2019  1:30 PM   Cycle Continuous 1/26/2019  1:30 PM   Target Pressure (mmHg) 125 1/26/2019  1:30 PM   Canister Changed Yes 1/26/2019  1:30 PM   Dressing Status Clean;Dry; Intact 1/27/2019  5:00 PM       Incision 08/15/18 Knee Left (Active)       Incision 12/03/18 Groin Left (Active)       Incision 12/03/18 Foot Left (Active)       Incision 12/03/18 Abdomen Left (Active)       Incision 01/25/19 Foot Left (Active)   Incision Description NANCI 1/27/2019  9:20 PM   Dressing ABD;Dry dressing 1/27/2019  9:20 PM   Dressing Status Clean;Dry; Intact 1/27/2019  9:20 PM       Incision 01/25/19 Thigh Left (Active)   Incision Description NANCI 1/27/2019  9:20 PM   Dressing Dry dressing 1/27/2019  9:20 PM   Wound packed? Yes 1/27/2019  9:20 PM   Dressing Status Clean;Dry; Intact 1/27/2019  9:20 PM

## 2019-01-29 LAB
ALBUMIN SERPL BCP-MCNC: 2.9 G/DL (ref 3.5–5.7)
ALP SERPL-CCNC: 69 U/L (ref 55–165)
ALT SERPL W P-5'-P-CCNC: 50 U/L (ref 7–52)
ANION GAP SERPL CALCULATED.3IONS-SCNC: 10 MMOL/L (ref 4–13)
AST SERPL W P-5'-P-CCNC: 25 U/L (ref 13–39)
BILIRUB SERPL-MCNC: 0.5 MG/DL (ref 0.2–1)
BUN SERPL-MCNC: 10 MG/DL (ref 7–25)
CALCIUM SERPL-MCNC: 8.5 MG/DL (ref 8.6–10.5)
CHLORIDE SERPL-SCNC: 102 MMOL/L (ref 98–107)
CO2 SERPL-SCNC: 24 MMOL/L (ref 21–31)
CREAT SERPL-MCNC: 0.74 MG/DL (ref 0.7–1.3)
ERYTHROCYTE [DISTWIDTH] IN BLOOD BY AUTOMATED COUNT: 15 % (ref 11.5–14.5)
GFR SERPL CREATININE-BSD FRML MDRD: 83 ML/MIN/1.73SQ M
GLUCOSE SERPL-MCNC: 76 MG/DL (ref 65–99)
HCT VFR BLD AUTO: 26 % (ref 36.5–49.3)
HGB BLD-MCNC: 8.5 G/DL (ref 14–18)
INR PPP: 1.4 (ref 0.9–1.5)
MCH RBC QN AUTO: 28.8 PG (ref 26–34)
MCHC RBC AUTO-ENTMCNC: 32.8 G/DL (ref 31–37)
MCV RBC AUTO: 88 FL (ref 81–99)
PLATELET # BLD AUTO: 336 THOUSANDS/UL (ref 149–390)
PMV BLD AUTO: 8 FL (ref 8.6–11.7)
POTASSIUM SERPL-SCNC: 3.6 MMOL/L (ref 3.5–5.5)
PROT SERPL-MCNC: 6 G/DL (ref 6.4–8.9)
PROTHROMBIN TIME: 16.3 SECONDS (ref 10.2–13)
RBC # BLD AUTO: 2.95 MILLION/UL (ref 4.3–5.9)
SODIUM SERPL-SCNC: 136 MMOL/L (ref 134–143)
WBC # BLD AUTO: 7.8 THOUSAND/UL (ref 4.8–10.8)

## 2019-01-29 PROCEDURE — 97530 THERAPEUTIC ACTIVITIES: CPT

## 2019-01-29 PROCEDURE — 80053 COMPREHEN METABOLIC PANEL: CPT | Performed by: INTERNAL MEDICINE

## 2019-01-29 PROCEDURE — 85610 PROTHROMBIN TIME: CPT | Performed by: INTERNAL MEDICINE

## 2019-01-29 PROCEDURE — 85027 COMPLETE CBC AUTOMATED: CPT | Performed by: INTERNAL MEDICINE

## 2019-01-29 PROCEDURE — 99024 POSTOP FOLLOW-UP VISIT: CPT | Performed by: PHYSICIAN ASSISTANT

## 2019-01-29 PROCEDURE — 97116 GAIT TRAINING THERAPY: CPT

## 2019-01-29 PROCEDURE — 99232 SBSQ HOSP IP/OBS MODERATE 35: CPT | Performed by: INTERNAL MEDICINE

## 2019-01-29 RX ORDER — WARFARIN SODIUM 5 MG/1
5 TABLET ORAL
Status: DISCONTINUED | OUTPATIENT
Start: 2019-01-29 | End: 2019-01-30

## 2019-01-29 RX ADMIN — ASPIRIN 81 MG 81 MG: 81 TABLET ORAL at 09:35

## 2019-01-29 RX ADMIN — AMPICILLIN SODIUM AND SULBACTAM SODIUM 3 G: 2; 1 INJECTION, POWDER, FOR SOLUTION INTRAMUSCULAR; INTRAVENOUS at 12:06

## 2019-01-29 RX ADMIN — PREDNISOLONE ACETATE 1 DROP: 10 SUSPENSION/ DROPS OPHTHALMIC at 09:36

## 2019-01-29 RX ADMIN — AMPICILLIN SODIUM AND SULBACTAM SODIUM 3 G: 2; 1 INJECTION, POWDER, FOR SOLUTION INTRAMUSCULAR; INTRAVENOUS at 23:23

## 2019-01-29 RX ADMIN — AMPICILLIN SODIUM AND SULBACTAM SODIUM 3 G: 2; 1 INJECTION, POWDER, FOR SOLUTION INTRAMUSCULAR; INTRAVENOUS at 05:27

## 2019-01-29 RX ADMIN — DOCUSATE SODIUM 100 MG: 100 CAPSULE, LIQUID FILLED ORAL at 17:11

## 2019-01-29 RX ADMIN — AMPICILLIN SODIUM AND SULBACTAM SODIUM 3 G: 2; 1 INJECTION, POWDER, FOR SOLUTION INTRAMUSCULAR; INTRAVENOUS at 17:12

## 2019-01-29 RX ADMIN — ATORVASTATIN CALCIUM 40 MG: 40 TABLET, FILM COATED ORAL at 16:22

## 2019-01-29 RX ADMIN — AMLODIPINE BESYLATE 10 MG: 5 TABLET ORAL at 09:35

## 2019-01-29 RX ADMIN — TIMOLOL MALEATE 1 DROP: 5 SOLUTION OPHTHALMIC at 09:36

## 2019-01-29 RX ADMIN — ATROPINE SULFATE 1 DROP: 10 SOLUTION/ DROPS OPHTHALMIC at 09:36

## 2019-01-29 RX ADMIN — OXYCODONE HYDROCHLORIDE 5 MG: 5 TABLET ORAL at 14:24

## 2019-01-29 RX ADMIN — TIMOLOL MALEATE 1 DROP: 5 SOLUTION OPHTHALMIC at 17:11

## 2019-01-29 RX ADMIN — WARFARIN SODIUM 5 MG: 5 TABLET ORAL at 17:11

## 2019-01-29 RX ADMIN — LOSARTAN POTASSIUM 50 MG: 50 TABLET, FILM COATED ORAL at 09:36

## 2019-01-29 RX ADMIN — DOCUSATE SODIUM 100 MG: 100 CAPSULE, LIQUID FILLED ORAL at 09:36

## 2019-01-29 RX ADMIN — VITAMIN D, TAB 1000IU (100/BT) 1000 UNITS: 25 TAB at 09:35

## 2019-01-29 RX ADMIN — NIACIN 1500 MG: 500 TABLET, FILM COATED, EXTENDED RELEASE ORAL at 22:07

## 2019-01-29 NOTE — SOCIAL WORK
I placed a kan to the pt's son to make him aware the pt ws declined aru and the snf choices he gave are unable to accept, they do not have open beds, I was instructed to meet with the tp for other choices and the only choice he gave was [facility], referral was sent

## 2019-01-29 NOTE — ASSESSMENT & PLAN NOTE
Status post I&D on 01/25/2019 by Dr Tammy Villalta  Cultures taken and showing streptococcus  On appropriate antibiotics per internal medicine  Wound VAC applied in OR on 01/28 with wound showing significant improvement  Continue M,W,F VAC schedule upon discharge  Would consider STR/SNF placement due to the patient living alone with now two wound VACs to his legs

## 2019-01-29 NOTE — PHYSICAL THERAPY NOTE
Physical Therapy Treatment Note       01/29/19 0922   Pain Assessment   Pain Assessment 0-10   Pain Score 3   Pain Type Acute pain;Surgical pain   Pain Location Foot   Pain Orientation Left   Restrictions/Precautions   Weight Bearing Precautions Per Order Yes  (PT encouraged NWB on L LE until further clarified)   LLE Weight Bearing Per Order NWB  (PT encouraged NWB; pt non compliant)   Other Precautions Fall Risk;Multiple lines   General   Chart Reviewed Yes   Response to Previous Treatment Patient with no complaints from previous session  Family/Caregiver Present No   Cognition   Overall Cognitive Status WFL   Arousal/Participation Alert; Cooperative   Attention Within functional limits   Orientation Level Oriented X4   Memory Within functional limits   Following Commands Follows one step commands without difficulty   Comments pt agreeable to PT session   Subjective   Subjective "I can get up and do something now"   Bed Mobility   Supine to Sit 4  Minimal assistance   Additional items Assist x 1;HOB elevated; Increased time required;Verbal cues;LE management   Transfers   Sit to Stand 4  Minimal assistance   Additional items Assist x 1; Increased time required;Verbal cues   Stand to Sit 4  Minimal assistance   Additional items Assist x 1; Armrests; Verbal cues; Impulsive   Toilet transfer 4  Minimal assistance   Additional items Assist x 1;Standard toilet;Verbal cues; Increased time required   Ambulation/Elevation   Gait pattern Decreased foot clearance; Short stride; Excessively slow;Decreased L stance   Gait Assistance 5  Supervision  (SBA for safety)   Additional items Assist x 1;Verbal cues; Tactile cues   Assistive Device Rolling walker   Distance 125', 25'   Balance   Static Sitting Good   Dynamic Sitting Fair +   Static Standing Fair   Dynamic Standing Fair -   Ambulatory Fair -   Endurance Deficit   Endurance Deficit Yes   Activity Tolerance   Activity Tolerance Patient limited by fatigue;Treatment limited secondary to medical complications (Comment)  (presence of wound vac)   Nurse Made Aware Yes, RN Jael Huerta verbalized pt appropriate for PT session, made aware of outcomes/recs   Assessment   Prognosis Good   Problem List Decreased strength;Decreased endurance; Impaired balance;Decreased mobility; Decreased safety awareness; Impaired vision;Decreased skin integrity   Assessment Pt seen for PT treatment session this date with interventions consisting of gait training w/ emphasis on improving pt's ability to ambulate level surfaces x 125 ft, 25 ft with SBA provided by therapist with RW and therapeutic activity consisting of training: supine<>sit transfers, sit<>stand transfers and vc and tactile cues for static standing posture faciliation  Pt agreeable to PT treatment session upon arrival, pt found supine in bed w/ HOB elevated, in no apparent distress, A&O x 4 and responsive  In comparison to previous session, pt with improvements in tolerating greater mobility  Pt requiring increased VCs for proper technique and for minimizing WBing through L LE  Post session: pt returned back to recliner, all needs in reach and RN notified of session findings/recommendations Continue to recommend STR at time of d/c in order to maximize pt's functional independence and safety w/ mobility  Pt continues to be functioning below baseline level, and remains limited 2* factors listed above  PT will continue to see pt while here in order to address the deficits listed above and provide interventions consistent w/ POC in effort to achieve STGs  Goals   Patient Goals "I want to sit up"   LTG Expiration Date 02/04/19   Long Term Goal #1 goals remain appropriate   Treatment Day 1   Plan   Treatment/Interventions Functional transfer training;LE strengthening/ROM; Therapeutic exercise; Endurance training;Bed mobility;Gait training;Equipment eval/education   Progress Slow progress, decreased activity tolerance   PT Frequency 5x/wk   Recommendation Recommendation Short-term skilled PT   Equipment Recommended Walker   PT - OK to Discharge Yes  (when medically cleared, if to STR)       Brian Steel, PT    Time of PT treatment session: 9187-4912

## 2019-01-29 NOTE — PROGRESS NOTES
Progress Note - Jennifer Zacarias 3/5/1931, 80 y o  male MRN: [id number]    Unit/Bed#: -02 Encounter: [id number]    Primary Care Provider: Ledy Mays MD   Date and time admitted to hospital: 1/23/2019  3:57 PM        * Abscess of left groin   Assessment & Plan    Status post I&D on 01/25/2019 by Dr Mark Cook  Cultures taken and showing streptococcus  On appropriate antibiotics per internal medicine  Wound VAC applied in OR on 01/28 with wound showing significant improvement  Continue M,W,F VAC schedule upon discharge  Would consider STR/SNF placement due to the patient living alone with now two wound VACs to his legs  Subjective/Objective   Chief Complaint: "It doesn't hurt as bad"    Subjective: Patient with minimal discomfort today  Wound dressing remains intact  Objective:     Blood pressure 112/64, pulse 60, temperature (!) 97 3 °F (36 3 °C), temperature source Tympanic, resp  rate 18, height 5' 7" (1 702 m), weight 77 1 kg (170 lb), SpO2 91 %  ,Body mass index is 26 63 kg/m²  Intake/Output Summary (Last 24 hours) at 01/29/19 1118  Last data filed at 01/29/19 5606   Gross per 24 hour   Intake             1165 ml   Output              400 ml   Net              765 ml       Invasive Devices     Peripheral Intravenous Line            Peripheral IV 01/25/19 Right Forearm 3 days    Peripheral IV 01/28/19 Left Arm 1 day          Drain            Negative Pressure Wound Therapy (V A C ) Foot Other (Comment) 4 days    Negative Pressure Wound Therapy (V A C ) Other (Comment) Anterior less than 1 day                Physical Exam   Constitutional: He is oriented to person, place, and time  He appears well-developed and well-nourished  No distress  HENT:   Head: Normocephalic and atraumatic  Neck: Normal range of motion  Pulmonary/Chest: No respiratory distress  Musculoskeletal: Normal range of motion  Neurological: He is alert and oriented to person, place, and time     Skin: Skin is warm and dry  Capillary refill takes less than 2 seconds  He is not diaphoretic  Groin wound with vac dressing intact  Good seal and no leak  Foam in good position  No cellulitis  Psychiatric: He has a normal mood and affect  His behavior is normal    Vitals reviewed  Lab, Imaging and other studies:  I have personally reviewed pertinent lab results    , CBC:   Lab Results   Component Value Date    WBC 7 80 01/29/2019    HGB 8 5 (L) 01/29/2019    HCT 26 0 (L) 01/29/2019    MCV 88 01/29/2019     01/29/2019    MCH 28 8 01/29/2019    MCHC 32 8 01/29/2019    RDW 15 0 (H) 01/29/2019    MPV 8 0 (L) 01/29/2019   , CMP:   Lab Results   Component Value Date    SODIUM 136 01/29/2019    K 3 6 01/29/2019     01/29/2019    CO2 24 01/29/2019    BUN 10 01/29/2019    CREATININE 0 74 01/29/2019    CALCIUM 8 5 (L) 01/29/2019    AST 25 01/29/2019    ALT 50 01/29/2019    ALKPHOS 69 01/29/2019    EGFR 83 01/29/2019     VTE Pharmacologic Prophylaxis: Reason for no pharmacologic prophylaxis safe to resume anticoagulation, defer to primary  VTE Mechanical Prophylaxis: sequential compression device

## 2019-01-29 NOTE — SOCIAL WORK
I met with the pt's son and reviewed d/c plan, I made him aware that pt dept is recommending rehab, son was given a list of snf's, I then met with the pt and son to discuss the plan, permission was given to send a referral to [facility], referrals were sent, information had been sent to [facility] before we had this meeting

## 2019-01-29 NOTE — SOCIAL WORK
I received the order form from the doctor and sent information for a wound vac, I met with the pt to ask about going to snf, pt lives alone, pt has refused snf, pt dept has to assess  ept for safety at home alone, I will call the pt's daughter to discuss the safety of the pt's plan  Cm will continue to follow

## 2019-01-29 NOTE — PLAN OF CARE
Problem: PHYSICAL THERAPY ADULT  Goal: Performs mobility at highest level of function for planned discharge setting  See evaluation for individualized goals  Treatment/Interventions: Functional transfer training, LE strengthening/ROM, Therapeutic exercise, Endurance training, Bed mobility, Gait training, Equipment eval/education  Equipment Recommended: Other (Comment) (TBD w/ functional progression as safely tolerated)  See flowsheet documentation for full assessment, interventions and recommendations  [facility] [place] [facility]     Outcome: Progressing  Prognosis: Good  Problem List: Decreased strength, Decreased endurance, Impaired balance, Decreased mobility, Decreased safety awareness, Impaired vision, Decreased skin integrity  Assessment: Pt seen for PT treatment session this date with interventions consisting of gait training w/ emphasis on improving pt's ability to ambulate level surfaces x 125 ft, 25 ft with SBA provided by therapist with RW and therapeutic activity consisting of training: supine<>sit transfers, sit<>stand transfers and vc and tactile cues for static standing posture faciliation  Pt agreeable to PT treatment session upon arrival, pt found supine in bed w/ HOB elevated, in no apparent distress, A&O x 4 and responsive  In comparison to previous session, pt with improvements in tolerating greater mobility  Pt requiring increased VCs for proper technique and for minimizing WBing through L LE  Post session: pt returned back to recliner, all needs in reach and RN notified of session findings/recommendations Continue to recommend STR at time of d/c in order to maximize pt's functional independence and safety w/ mobility  Pt continues to be functioning below baseline level, and remains limited 2* factors listed above  PT will continue to see pt while here in order to address the deficits listed above and provide interventions consistent w/ POC in effort to achieve STGs          Recommendation: Short-term skilled PT     PT - OK to Discharge: Yes (when medically cleared, if to STR)    See flowsheet documentation for full assessment

## 2019-01-29 NOTE — ASSESSMENT & PLAN NOTE
· Status post I and D in the OR with surgical team on 01/25/2019  · We will discharge on course of Augmentin for 7 days as per ID recommendations whom I curbsided yesterday  · Continue wound VAC  · Pain control as needed

## 2019-01-29 NOTE — ASSESSMENT & PLAN NOTE
· Chronic and stable  · Coumadin held due to surgery that was done on 01/25/2019 and patient did have a significant amount of bleeding from the groin site  · Patient received 2 units of FFP and vitamin K prior to surgery and also FFP/vitamin K after surgery due to postop bleeding  · Resume Coumadin as patient has been cleared by surgery

## 2019-01-29 NOTE — OCCUPATIONAL THERAPY NOTE
Pt  Denied need for OT intervention at this time  Reports that he washed self in bathroom earlier this morning  Was agreeable to have me return tomorrow to assess for any further needs  Cont  As appropriate      COLT Dalal/ELAINE

## 2019-01-29 NOTE — PROGRESS NOTES
Progress Note - Torrie Boss 3/5/1931, 80 y o  male MRN: [id number]    Unit/Bed#: -02 Encounter: [id number]    Primary Care Provider: José Miguel Jasso MD   Date and time admitted to hospital: 1/23/2019  3:57 PM        Acute blood loss anemia   Assessment & Plan    · Patient had blood loss from surgical site and left groin as well as left foot  · Will continue to monitor     Groin pain   Assessment & Plan    · Monitor for pain     Chronic atrial fibrillation [facility])   Assessment & Plan    · Chronic and stable  · Coumadin held due to surgery that was done on 01/25/2019 and patient did have a significant amount of bleeding from the groin site  · Patient received 2 units of FFP and vitamin K prior to surgery and also FFP/vitamin K after surgery due to postop bleeding  · Resume Coumadin as patient has been cleared by surgery     Coagulopathy ([facility] [address] )   Assessment & Plan    · Resume Coumadin, cleared by surgery for resumption  · Trend INR     Hyperlipidemia   Assessment & Plan    · Chronic and stable  · Continue niacin     Essential hypertension   Assessment & Plan    · Chronic and stable  · Continue Norvasc     Malignant melanoma of left foot ([facility] [address] )   Assessment & Plan    · This is a chronic history  · However the patient does have an area on his left foot with necrosis verses eschar  · Patient is status post surgical debridement in the OR with Podiatry on 01/25/2019  · Continue wound VAC       * Abscess of left groin   Assessment & Plan    · Status post I and D in the OR with surgical team on 01/25/2019  · We will discharge on course of Augmentin for 7 days as per ID recommendations whom I curbsided yesterday  · Continue wound VAC  · Pain control as needed         VTE Pharmacologic Prophylaxis: Pharmacologic: Warfarin (Coumadin)    Patient Centered Rounds: I have performed bedside rounds with nursing staff today      Discussions with Specialists or Other Care Team Provider: n/a  Education and Discussions with Family / Patient: patient    Time Spent for Care: 20 minutes  More than 50% of total time spent on counseling and coordination of care as described above  Current Length of Stay: 6 day(s)    Current Patient Status: Inpatient   Certification Statement: The patient will continue to require additional inpatient hospital stay due to Abscess and wound infection    Discharge Plan:  Home versus SNF tomorrow  Patient prefers to go home, however, PT/OT are recommending rehab  Also awaiting Case Management coordination of outpatient wound VAC    Code Status: Level 3 - DNAR and DNI    Subjective:   Patient seen examined  No acute events overnight  Feels much better    Objective:     Vitals:   Temp (24hrs), Av 9 °F (36 6 °C), Min:97 2 °F (36 2 °C), Max:99 2 °F (37 3 °C)    Temp:  [97 2 °F (36 2 °C)-99 2 °F (37 3 °C)] 97 3 °F (36 3 °C)  HR:  [60-86] 60  Resp:  [18-33] 18  BP: ()/(54-68) 112/64  SpO2:  [91 %-97 %] 91 %  Body mass index is 26 63 kg/m²  Input and Output Summary (last 24 hours): Intake/Output Summary (Last 24 hours) at 19 1157  Last data filed at 19 6416   Gross per 24 hour   Intake             1165 ml   Output              400 ml   Net              765 ml       Physical Exam:     Physical Exam   Constitutional: He is oriented to person, place, and time  He appears well-developed and well-nourished  HENT:   Head: Normocephalic and atraumatic  Eyes: EOM are normal    Neck: Normal range of motion  Neck supple  No JVD present  Cardiovascular: Normal rate and regular rhythm  Pulmonary/Chest: Effort normal and breath sounds normal    Abdominal: Soft  Bowel sounds are normal  He exhibits no distension  Musculoskeletal: He exhibits no edema  Left groin and foot wound VAC in place   Neurological: He is oriented to person, place, and time  Skin: Skin is warm  No rash noted  No erythema  Psychiatric: He has a normal mood and affect   His behavior is normal  Thought content normal    Nursing note and vitals reviewed  Additional Data:     Labs:      Results from last 7 days  Lab Units 01/29/19  0441 01/28/19  0609   WBC Thousand/uL 7 80 7 60   HEMOGLOBIN g/dL 8 5* 8 7*   HEMATOCRIT % 26 0* 26 6*   PLATELETS Thousands/uL 336 288   NEUTROS PCT %  --  72   LYMPHS PCT %  --  17*   MONOS PCT %  --  6   EOS PCT %  --  5       Results from last 7 days  Lab Units 01/29/19  0441   POTASSIUM mmol/L 3 6   CHLORIDE mmol/L 102   CO2 mmol/L 24   BUN mg/dL 10   CREATININE mg/dL 0 74   CALCIUM mg/dL 8 5*   ALK PHOS U/L 69   ALT U/L 50   AST U/L 25       Results from last 7 days  Lab Units 01/29/19  0441   INR  1 40               * I Have Reviewed All Lab Data Listed Above  * Additional Pertinent Lab Tests Reviewed: Jenni 66 Admission  Reviewed    Imaging:  Imaging Reports Reviewed Today Include: n/a    Recent Cultures (last 7 days):       Results from last 7 days  Lab Units 01/25/19  1116 01/23/19  1648   BLOOD CULTURE   --  No Growth After 5 Days  No Growth After 5 Days     GRAM STAIN RESULT  2+ Polys  2+ Gram positive cocci in pairs  1+ Gram positive cocci in pairs and chains  Rare Gram negative rods  --    WOUND CULTURE  3+ Growth of Beta Hemolytic Streptococcus Group B*  Growth in Broth culture only Serratia marcescens*  --        Last 24 Hours Medication List:     Current Facility-Administered Medications:  acetaminophen 650 mg Oral Q4H PRN Cailin Laws MD    amLODIPine 10 mg Oral Daily TRACE Simmons    ampicillin-sulbactam 3 g Intravenous Q6H TRACE Simmons Last Rate: 3 g (01/29/19 0527)   aspirin 81 mg Oral Daily TRACE Simmons    atorvastatin 40 mg Oral Daily With [employer]TRACE    atropine 1 drop Right Eye Daily TRACE Simmons    cholecalciferol 1,000 Units Oral Daily TRACE Simmons    docusate sodium 100 mg Oral BID Cailin Laws MD    losartan 50 mg Oral Daily Mclaughlin TRACE Aponte morphine injection 1 mg Intravenous Q4H PRN Meagan Mclaughlin PA-C    niacin 1,500 mg Oral HS TRACE Simmons    ondansetron 4 mg Intravenous Q6H PRN TRACE Simmons    oxyCODONE 5 mg Oral Q4H PRN Mara Francisco MD    potassium chloride 20 mEq Intravenous Once Iban Beltran MD    prednisoLONE acetate 1 drop Right Eye Daily TRACE Simmons    timolol 1 drop Left Eye BID TRACE Simmons    warfarin 5 mg Oral Daily (warfarin) Marcia Cherry MD         Today, Patient Was Seen By: Marcia Cherry MD    ** Please Note: Dictation voice to text software may have been used in the creation of this document   **

## 2019-01-30 PROBLEM — L02.416 CELLULITIS AND ABSCESS OF LEFT LOWER EXTREMITY: Status: RESOLVED | Noted: 2019-01-23 | Resolved: 2019-01-30

## 2019-01-30 PROBLEM — D72.823 LEUKEMOID REACTION: Chronic | Status: RESOLVED | Noted: 2019-01-23 | Resolved: 2019-01-30

## 2019-01-30 PROBLEM — D68.9 COAGULOPATHY (HCC): Chronic | Status: RESOLVED | Noted: 2018-11-09 | Resolved: 2019-01-30

## 2019-01-30 PROBLEM — L02.214 ABSCESS OF LEFT GROIN: Status: RESOLVED | Noted: 2019-01-23 | Resolved: 2019-01-30

## 2019-01-30 PROBLEM — S31.109A WOUND OF LEFT GROIN: Status: ACTIVE | Noted: 2019-01-30

## 2019-01-30 PROBLEM — R10.30 GROIN PAIN: Chronic | Status: RESOLVED | Noted: 2019-01-23 | Resolved: 2019-01-30

## 2019-01-30 PROBLEM — L03.116 CELLULITIS AND ABSCESS OF LEFT LOWER EXTREMITY: Status: RESOLVED | Noted: 2019-01-23 | Resolved: 2019-01-30

## 2019-01-30 PROBLEM — D62 ACUTE BLOOD LOSS ANEMIA: Status: RESOLVED | Noted: 2019-01-26 | Resolved: 2019-01-30

## 2019-01-30 LAB
ALBUMIN SERPL BCP-MCNC: 3 G/DL (ref 3.5–5.7)
ALP SERPL-CCNC: 73 U/L (ref 55–165)
ALT SERPL W P-5'-P-CCNC: 36 U/L (ref 7–52)
ANION GAP SERPL CALCULATED.3IONS-SCNC: 6 MMOL/L (ref 4–13)
AST SERPL W P-5'-P-CCNC: 19 U/L (ref 13–39)
BILIRUB SERPL-MCNC: 0.4 MG/DL (ref 0.2–1)
BUN SERPL-MCNC: 13 MG/DL (ref 7–25)
CALCIUM SERPL-MCNC: 8.3 MG/DL (ref 8.6–10.5)
CHLORIDE SERPL-SCNC: 105 MMOL/L (ref 98–107)
CO2 SERPL-SCNC: 28 MMOL/L (ref 21–31)
CREAT SERPL-MCNC: 0.85 MG/DL (ref 0.7–1.3)
ERYTHROCYTE [DISTWIDTH] IN BLOOD BY AUTOMATED COUNT: 15 % (ref 11.5–14.5)
GFR SERPL CREATININE-BSD FRML MDRD: 78 ML/MIN/1.73SQ M
GLUCOSE SERPL-MCNC: 88 MG/DL (ref 65–99)
HCT VFR BLD AUTO: 26.3 % (ref 36.5–49.3)
HGB BLD-MCNC: 8.8 G/DL (ref 14–18)
INR PPP: 1.6 (ref 0.9–1.5)
MCH RBC QN AUTO: 29.3 PG (ref 26–34)
MCHC RBC AUTO-ENTMCNC: 33.3 G/DL (ref 31–37)
MCV RBC AUTO: 88 FL (ref 81–99)
PLATELET # BLD AUTO: 371 THOUSANDS/UL (ref 149–390)
PMV BLD AUTO: 7.8 FL (ref 8.6–11.7)
POTASSIUM SERPL-SCNC: 4.2 MMOL/L (ref 3.5–5.5)
PROT SERPL-MCNC: 5.9 G/DL (ref 6.4–8.9)
PROTHROMBIN TIME: 18.6 SECONDS (ref 10.2–13)
RBC # BLD AUTO: 2.99 MILLION/UL (ref 4.3–5.9)
SODIUM SERPL-SCNC: 139 MMOL/L (ref 134–143)
WBC # BLD AUTO: 6.7 THOUSAND/UL (ref 4.8–10.8)

## 2019-01-30 PROCEDURE — 85027 COMPLETE CBC AUTOMATED: CPT | Performed by: INTERNAL MEDICINE

## 2019-01-30 PROCEDURE — 85610 PROTHROMBIN TIME: CPT | Performed by: INTERNAL MEDICINE

## 2019-01-30 PROCEDURE — 97110 THERAPEUTIC EXERCISES: CPT

## 2019-01-30 PROCEDURE — 99239 HOSP IP/OBS DSCHRG MGMT >30: CPT | Performed by: INTERNAL MEDICINE

## 2019-01-30 PROCEDURE — 80053 COMPREHEN METABOLIC PANEL: CPT | Performed by: INTERNAL MEDICINE

## 2019-01-30 PROCEDURE — 99024 POSTOP FOLLOW-UP VISIT: CPT | Performed by: PHYSICIAN ASSISTANT

## 2019-01-30 PROCEDURE — 97530 THERAPEUTIC ACTIVITIES: CPT

## 2019-01-30 RX ORDER — WARFARIN SODIUM 1 MG/1
3 TABLET ORAL
Status: DISCONTINUED | OUTPATIENT
Start: 2019-01-30 | End: 2019-01-31 | Stop reason: HOSPADM

## 2019-01-30 RX ORDER — AMOXICILLIN AND CLAVULANATE POTASSIUM 875; 125 MG/1; MG/1
1 TABLET, FILM COATED ORAL EVERY 12 HOURS SCHEDULED
Qty: 14 TABLET | Refills: 0 | Status: SHIPPED | OUTPATIENT
Start: 2019-01-30 | End: 2019-02-06

## 2019-01-30 RX ORDER — WARFARIN SODIUM 5 MG/1
2.5 TABLET ORAL DAILY
Refills: 0
Start: 2019-01-30

## 2019-01-30 RX ADMIN — LOSARTAN POTASSIUM 50 MG: 50 TABLET, FILM COATED ORAL at 08:02

## 2019-01-30 RX ADMIN — OXYCODONE HYDROCHLORIDE 5 MG: 5 TABLET ORAL at 00:04

## 2019-01-30 RX ADMIN — VITAMIN D, TAB 1000IU (100/BT) 1000 UNITS: 25 TAB at 08:02

## 2019-01-30 RX ADMIN — ASPIRIN 81 MG 81 MG: 81 TABLET ORAL at 08:02

## 2019-01-30 RX ADMIN — ATROPINE SULFATE 1 DROP: 10 SOLUTION/ DROPS OPHTHALMIC at 08:02

## 2019-01-30 RX ADMIN — AMPICILLIN SODIUM AND SULBACTAM SODIUM 3 G: 2; 1 INJECTION, POWDER, FOR SOLUTION INTRAMUSCULAR; INTRAVENOUS at 12:27

## 2019-01-30 RX ADMIN — ATORVASTATIN CALCIUM 40 MG: 40 TABLET, FILM COATED ORAL at 16:19

## 2019-01-30 RX ADMIN — OXYCODONE HYDROCHLORIDE 5 MG: 5 TABLET ORAL at 18:33

## 2019-01-30 RX ADMIN — TIMOLOL MALEATE 1 DROP: 5 SOLUTION OPHTHALMIC at 08:02

## 2019-01-30 RX ADMIN — TIMOLOL MALEATE 1 DROP: 5 SOLUTION OPHTHALMIC at 17:35

## 2019-01-30 RX ADMIN — AMPICILLIN SODIUM AND SULBACTAM SODIUM 3 G: 2; 1 INJECTION, POWDER, FOR SOLUTION INTRAMUSCULAR; INTRAVENOUS at 17:41

## 2019-01-30 RX ADMIN — DOCUSATE SODIUM 100 MG: 100 CAPSULE, LIQUID FILLED ORAL at 08:02

## 2019-01-30 RX ADMIN — AMPICILLIN SODIUM AND SULBACTAM SODIUM 3 G: 2; 1 INJECTION, POWDER, FOR SOLUTION INTRAMUSCULAR; INTRAVENOUS at 05:26

## 2019-01-30 RX ADMIN — OXYCODONE HYDROCHLORIDE 5 MG: 5 TABLET ORAL at 10:18

## 2019-01-30 RX ADMIN — NIACIN 1500 MG: 500 TABLET, FILM COATED, EXTENDED RELEASE ORAL at 22:34

## 2019-01-30 RX ADMIN — DOCUSATE SODIUM 100 MG: 100 CAPSULE, LIQUID FILLED ORAL at 17:35

## 2019-01-30 RX ADMIN — PREDNISOLONE ACETATE 1 DROP: 10 SUSPENSION/ DROPS OPHTHALMIC at 08:02

## 2019-01-30 RX ADMIN — OXYCODONE HYDROCHLORIDE 5 MG: 5 TABLET ORAL at 22:34

## 2019-01-30 RX ADMIN — MORPHINE SULFATE 2 MG: 2 INJECTION, SOLUTION INTRAMUSCULAR; INTRAVENOUS at 07:59

## 2019-01-30 RX ADMIN — AMLODIPINE BESYLATE 10 MG: 5 TABLET ORAL at 08:02

## 2019-01-30 RX ADMIN — WARFARIN SODIUM 3 MG: 1 TABLET ORAL at 17:35

## 2019-01-30 NOTE — PROGRESS NOTES
Progress Note - Brenda Patiño 3/5/1931, 80 y o  male MRN: [id number]    Unit/Bed#: -02 Encounter: [id number]    Primary Care Provider: Carlos Julian MD   Date and time admitted to hospital: 1/23/2019  3:57 PM        Wound of left groin   Assessment & Plan    Status post I&D of infected post-op hematoma from recent sentinel lymph node biopsy  No evidence of cellulitis/residual abscess  Induration improved  Will continue wound VAC therapy at this time  Anticipate he'll need VAC for about 1 week  Subjective/Objective   Chief Complaint: "I'm fine"    Subjective: Patient with mild groin pain at his wound  Offers no other complaints  Objective:     Blood pressure 112/60, pulse 75, temperature 97 5 °F (36 4 °C), temperature source Tympanic, resp  rate 16, height 5' 7" (1 702 m), weight 77 1 kg (170 lb), SpO2 95 %  ,Body mass index is 26 63 kg/m²  Intake/Output Summary (Last 24 hours) at 01/30/19 1236  Last data filed at 01/30/19 1227   Gross per 24 hour   Intake             1080 ml   Output              900 ml   Net              180 ml       Invasive Devices     Peripheral Intravenous Line            Peripheral IV 01/25/19 Right Forearm 4 days    Peripheral IV 01/28/19 Left Arm 2 days          Drain            Negative Pressure Wound Therapy (V A C ) Foot Other (Comment) 5 days    Negative Pressure Wound Therapy (V A C ) Other (Comment) Anterior 1 day                Physical Exam   Constitutional: He is oriented to person, place, and time  He appears well-developed and well-nourished  No distress  HENT:   Head: Normocephalic and atraumatic  Neck: Normal range of motion  Pulmonary/Chest: No respiratory distress  Musculoskeletal: Normal range of motion  Legs:  Neurological: He is alert and oriented to person, place, and time  Skin: Skin is warm and dry  Capillary refill takes less than 2 seconds  He is not diaphoretic  Psychiatric: He has a normal mood and affect   His behavior is normal          Lab, Imaging and other studies:  I have personally reviewed pertinent lab results    , CBC:   Lab Results   Component Value Date    WBC 6 70 01/30/2019    HGB 8 8 (L) 01/30/2019    HCT 26 3 (L) 01/30/2019    MCV 88 01/30/2019     01/30/2019    MCH 29 3 01/30/2019    MCHC 33 3 01/30/2019    RDW 15 0 (H) 01/30/2019    MPV 7 8 (L) 01/30/2019   , CMP:   Lab Results   Component Value Date    SODIUM 139 01/30/2019    K 4 2 01/30/2019     01/30/2019    CO2 28 01/30/2019    BUN 13 01/30/2019    CREATININE 0 85 01/30/2019    CALCIUM 8 3 (L) 01/30/2019    AST 19 01/30/2019    ALT 36 01/30/2019    ALKPHOS 73 01/30/2019    EGFR 78 01/30/2019     VTE Pharmacologic Prophylaxis: Warfarin (Coumadin)  VTE Mechanical Prophylaxis: sequential compression device

## 2019-01-30 NOTE — ASSESSMENT & PLAN NOTE
· This is a chronic history  · However the patient does have an area on his left foot with necrosis verses eschar  · Patient is status post surgical debridement in the OR with Podiatry on 01/25/2019  · Continue wound VAC MWF changes

## 2019-01-30 NOTE — PLAN OF CARE
Problem: PHYSICAL THERAPY ADULT  Goal: Performs mobility at highest level of function for planned discharge setting  See evaluation for individualized goals  Treatment/Interventions: Functional transfer training, LE strengthening/ROM, Therapeutic exercise, Endurance training, Bed mobility, Gait training, Equipment eval/education  Equipment Recommended: Other (Comment) (TBD w/ functional progression as safely tolerated)  See flowsheet documentation for full assessment, interventions and recommendations  Jenni Chowdhury PT     Outcome: Progressing  Prognosis: Good  Problem List: Decreased strength, Decreased range of motion, Decreased endurance, Impaired balance, Decreased mobility, Decreased skin integrity, Impaired vision, Decreased safety awareness  Assessment: Pt seen for PT treatment session this date with interventions consisting of Therapeutic exercise consisting of: AROM 20 reps B LE in sitting position and therapeutic activity consisting of training: bed mobility, supine<>sit transfers and sit<>stand transfers  Pt agreeable to PT treatment session upon arrival, pt found supine in bed, in no apparent distress  In comparison to previous session, pt with improvements in endurance and strength  Post session: pt returned BTB Continue to recommend STR at time of d/c in order to maximize pt's functional independence and safety w/ mobility  Pt continues to be functioning below baseline level, and remains limited 2* factors listed above and including decreased skin integrity and decreased functional mobility  PT will continue to see pt while here in order to address the deficits listed above and provide interventions consistent w/ POC in effort to achieve STGs  Recommendation: Short-term skilled PT     PT - OK to Discharge: Yes  Claudette Dillon, PTA    See flowsheet documentation for full assessment

## 2019-01-30 NOTE — DISCHARGE SUMMARY
Discharge- [facility] 3/5/1931, 80 y o  male MRN: [id number]    Unit/Bed#: -02 Encounter: [id number]    Primary Care Provider: Caroline Mehta MD   Date and time admitted to hospital: 1/23/2019  3:57 PM         Glaucoma of right eye   Assessment & Plan    · Chronic and stable  · Continue eyedrops     Chronic atrial fibrillation ([facility] [address] )   Assessment & Plan    · Chronic and stable  · Coumadin held due to surgery that was done on 01/25/2019 and patient did have a significant amount of bleeding from the groin site  · Patient received 2 units of FFP and vitamin K prior to surgery and also FFP/vitamin K after surgery due to postop bleeding  · Resume Coumadin as patient has been cleared by surgery  · Check inr tomorrow     Essential hypertension   Assessment & Plan    · Chronic and stable  · Continue Norvasc     Malignant melanoma of left foot ([facility] [address] )   Assessment & Plan    · This is a chronic history  · However the patient does have an area on his left foot with necrosis verses eschar  · Patient is status post surgical debridement in the OR with Podiatry on 01/25/2019  · Continue wound VAC MWF changes       Acute blood loss anemiaresolved as of 1/30/2019   Assessment & Plan    · Patient had blood loss from surgical site and left groin as well as left foot  · hgb stable     Groin painresolved as of 1/30/2019   Assessment & Plan    · Monitor for pain     Coagulopathy (HCC)resolved as of 1/30/2019   Assessment & Plan    · Resume Coumadin, cleared by surgery for resumption  · Trend INR     * Abscess of left groinresolved as of 1/30/2019   Assessment & Plan    · Status post I and D in the OR with surgical team on 01/25/2019  · We will discharge on course of Augmentin for 7 days as per ID recommendations whom I curbsided yesterday  · Continue wound VAC  · Pain control as needed         Patient's discharge was delayed to today due to transportation issues, otherwise no change in above-mentioned plan    Discharging Physician / Practitioner: Janett Isidro MD  PCP: Brett Cid MD  Admission Date:   Admission Orders     Ordered        01/23/19 1805  Inpatient Admission (expected length of stay for this patient is greater than two midnights)  Once             Discharge Date: 1/31/19    Resolved Problems  Date Reviewed: 1/30/2019          Resolved    Coagulopathy ([address] ) 1/30/2019     Resolved by  Janett Isidro MD    Groin pain 1/30/2019     Resolved by  Janett Isidro MD    * (Principal)Abscess of left groin 1/30/2019     Resolved by  Janett Isidro MD    Leukemoid reaction 1/30/2019     Resolved by  Janett Isidro MD    Acute blood loss anemia 1/30/2019     Resolved by  Janett Isidro MD    Cellulitis and abscess of left lower extremity 1/30/2019     Resolved by  Janett Isidro MD    Overview Signed 1/27/2019  9:45 AM by Nery Vaz, AWA     Added automatically from request for surgery [id number]               Consultations During Hospital Stay:  · Surgery  · podiatry    Procedures Performed:   · I&D    Significant Findings / Test Results:   · Group b strep in wound culture    Incidental Findings:   · n/a     Test Results Pending at Discharge (will require follow up):   · n/a     Outpatient Tests Requested:  · Cbc, cmp and INRin 3-7ALAK    Complications:  none    Reason for Admission: groin infection    Hospital Course:     Raheem Rodriguez is a 80 y o  male patient who originally presented to the hospital on 1/23/2019 due to fever and groin infection  Patient was found to have an abscess in his groin along with his foot  He was seen in consultation by surgery and Podiatry for both of these and they both have been drained  Patient has wound vacs in both groin and foot  Surgery and Podiatry recommending wound VAC changes every Monday Wednesday Friday and close outpatient follow-up  Cultures demonstrated group B strep and Serratia    I did speak with Infectious Disease over the phone and they recommended a 7 day course of Augmentin  Of note patient's Coumadin was held for surgery, and has subsequently been resumed  An INR should be checked within 1-3 days of discharge  Plan to discharge patient to skilled nursing facility  Please see above list of diagnoses and related plan for additional information  Condition at Discharge: good     Discharge Day Visit / Exam:     Subjective:  Patient seen examined  No acute events overnight  Symptoms improved  Vitals: Blood Pressure: 112/60 (01/30/19 0802)  Pulse: 75 (01/30/19 0750)  Temperature: 97 5 °F (36 4 °C) (01/30/19 0750)  Temp Source: Tympanic (01/30/19 0750)  Respirations: 16 (01/30/19 0750)  Height: 5' 7" (170 2 cm) (01/23/19 1554)  Weight - Scale: 77 1 kg (170 lb) (01/23/19 1554)  SpO2: 95 % (01/30/19 0750)  Exam:   Physical Exam   Constitutional: He is oriented to person, place, and time  He appears well-developed and well-nourished  HENT:   Head: Normocephalic and atraumatic  Eyes: EOM are normal    Neck: Normal range of motion  Neck supple  No JVD present  Cardiovascular: Normal rate, regular rhythm and normal heart sounds  Pulmonary/Chest: Effort normal and breath sounds normal  No respiratory distress  He has no wheezes  Abdominal: Soft  He exhibits no distension  There is no tenderness  Musculoskeletal: Normal range of motion  Left groin and foot with wound VAC in place   Neurological: He is alert and oriented to person, place, and time  Skin: Skin is warm  No erythema  Psychiatric: He has a normal mood and affect  His behavior is normal  Thought content normal    Nursing note and vitals reviewed  Discussion with Family: n/a    Discharge instructions/Information to patient and family:   See after visit summary for information provided to patient and family  Provisions for Follow-Up Care:  See after visit summary for information related to follow-up care and any pertinent home health orders        Disposition:     Other Ivan Kum[facility] to [facility] SN[facility]:   · Not Applicable to this Patient - Not Applicable to this Patient    Planned Readmission: none     Discharge Statement:  I spent 35 minutes discharging the patient  This time was spent on the day of discharge  I had direct contact with the patient on the day of discharge  Greater than 50% of the total time was spent examining patient, answering all patient questions, arranging and discussing plan of care with patient as well as directly providing post-discharge instructions  Additional time then spent on discharge activities  Discharge Medications:  See after visit summary for reconciled discharge medications provided to patient and family        ** Please Note: This note has been constructed using a voice recognition system **

## 2019-01-30 NOTE — PHYSICAL THERAPY NOTE
01/30/19 1345   Pain Assessment   Pain Assessment No/denies pain   General   Chart Reviewed Yes   Response to Previous Treatment Patient with no complaints from previous session  Family/Caregiver Present No   Cognition   Overall Cognitive Status WFL   Arousal/Participation Alert; Cooperative   Attention Within functional limits   Orientation Level Oriented X4   Memory Within functional limits   Following Commands Follows one step commands without difficulty   Comments pt agreeable to PT session   Subjective   Subjective "I have no pain just lying here"   Bed Mobility   Supine to Sit 5  Supervision   Additional items Assist x 1;HOB elevated; Bedrails;Verbal cues   Sit to Supine 5  Supervision   Additional items Assist x 1; Increased time required;Verbal cues;HOB elevated; Bedrails   Transfers   Sit to Stand 5  Supervision   Additional items Assist x 1;Bedrails; Impulsive;Verbal cues   Stand to Sit 5  Supervision   Additional items Assist x 1;Verbal cues; Impulsive   Additional Comments pt stood at bedside with RW and Sx1, no ambulation completed secondary to having two wound vacs   Balance   Static Sitting Good   Dynamic Sitting Fair +   Static Standing Fair   Dynamic Standing Fair -   Endurance Deficit   Endurance Deficit Yes   Endurance Deficit Description fatigue post exercise   Activity Tolerance   Activity Tolerance Treatment limited secondary to medical complications (Comment); Other (Comment)  (presence of wound vacs)   Exercises   Quad Sets Sitting;20 reps;AROM; Bilateral   Glute Sets Sitting;20 reps;AROM   Hip Flexion Sitting;20 reps;AROM; Bilateral   Hip Abduction Sitting;20 reps;AROM; Bilateral   Hip Adduction Sitting;20 reps;AROM; Bilateral   Knee AROM Long Arc Quad Sitting;20 reps;AROM; Bilateral   Ankle Pumps Sitting;20 reps;AROM; Bilateral   Marching Sitting;20 reps;AROM; Bilateral   Assessment   Prognosis Good   Problem List Decreased strength;Decreased range of motion;Decreased endurance; Impaired balance;Decreased mobility; Decreased skin integrity; Impaired vision;Decreased safety awareness   Assessment Pt seen for PT treatment session this date with interventions consisting of Therapeutic exercise consisting of: AROM 20 reps B LE in sitting position and therapeutic activity consisting of training: bed mobility, supine<>sit transfers and sit<>stand transfers  Pt agreeable to PT treatment session upon arrival, pt found supine in bed, in no apparent distress  In comparison to previous session, pt with improvements in endurance and strength  Post session: pt returned BTB Continue to recommend STR at time of d/c in order to maximize pt's functional independence and safety w/ mobility  Pt continues to be functioning below baseline level, and remains limited 2* factors listed above and including decreased skin integrity and decreased functional mobility  PT will continue to see pt while here in order to address the deficits listed above and provide interventions consistent w/ POC in effort to achieve STGs  Goals   Patient Goals to get wounds healed   Treatment Day 2   Plan   Treatment/Interventions Functional transfer training;LE strengthening/ROM; Therapeutic exercise; Endurance training;Bed mobility;Gait training   Progress Slow progress, medical status limitations   PT Frequency 5x/wk   Recommendation   Recommendation Short-term skilled PT   Equipment Recommended Walker   PT - OK to Discharge Yes   Additional Comments pt returned to bed with needs in reach   [address], PTA

## 2019-01-30 NOTE — PROGRESS NOTES
Progress Note - Wound   Chago Pinto 80 y o  male MRN: [id number]  Unit/Bed#: -02 Encounter: [id number]      Assessment:   Status post day 5 excisional wound debridement with application wound VAC  Plan:   Wound VAC change left lateral foot with black foam set at 125 mm Hg  Patient is anticipated for discharge to skilled nursing  Wound VAC changes should be done 3 times weekly  Subjective:  Patient presents with wound VAC in place no leaks  He relates no pain to left lateral foot denies fever chills night sweats  Objective:    Vitals: Blood pressure 112/60, pulse 75, temperature 97 5 °F (36 4 °C), temperature source Tympanic, resp  rate 16, height 5' 7" (1 702 m), weight 77 1 kg (170 lb), SpO2 95 %  ,Body mass index is 26 63 kg/m²  Physical Exam:  Left lateral wound base with no active bleeding  Increased epithelialization throughout the wound base approximately 50%  No interim changes in wound measurement  Lab, Imaging and other studies: I have personally reviewed pertinent reports  Negative Pressure Wound Therapy (V A C ) Foot Other (Comment) (Active)   Wound Vac change time out performed? Yes 1/26/2019  1:30 PM   Time Out conducted with? dr Rey Plummer 1/26/2019  1:30 PM   Black foam- # applied 1 1/30/2019  8:45 AM   Nonadherent (Adaptic)- # applied 1 1/30/2019  8:45 AM   Cycle Continuous 1/30/2019  8:45 AM   Target Pressure (mmHg) 125 1/30/2019  8:45 AM   Canister Changed No 1/30/2019  8:45 AM   Dressing Status Clean;Dry; Intact 1/30/2019  8:45 AM       Negative Pressure Wound Therapy (V A C ) Other (Comment) Anterior (Active)   Unit Type VAC ULTRA 1/28/2019  3:25 PM   Cycle Continuous 1/30/2019  8:45 AM   Target Pressure (mmHg) 125 1/30/2019  8:45 AM   Canister Changed No 1/30/2019  8:45 AM   Dressing Status Clean;Dry; Intact 1/30/2019  8:45 AM       Incision 08/15/18 Knee Left (Active)       Incision 12/03/18 Groin Left (Active)       Incision 12/03/18 Foot Left (Active) Incision 12/03/18 Abdomen Left (Active)       Incision 01/25/19 Foot Left (Active)   Incision Description Clean;Dry; Intact 1/30/2019  8:45 AM   Dressing Wound V A C  1/30/2019  8:45 AM   Dressing Status Clean;Dry; Intact 1/30/2019  8:45 AM       Incision 01/25/19 Thigh Left (Active)   Incision Description Clean;Dry; Intact 1/30/2019  8:45 AM   Dressing Wound V A C  1/30/2019  8:45 AM   Wound packed? Yes 1/27/2019  9:20 PM   Dressing Changed New dressing applied 1/30/2019  8:45 AM   Patient Tolerance Tolerated well 1/30/2019  8:45 AM   Dressing Status Clean;Dry; Intact 1/30/2019  8:45 AM       Incision 01/28/19 Thigh Left (Active)

## 2019-01-30 NOTE — ASSESSMENT & PLAN NOTE
Status post I&D of infected post-op hematoma from recent sentinel lymph node biopsy  No evidence of cellulitis/residual abscess  Induration improved  Will continue wound VAC therapy at this time  Anticipate he'll need VAC for about 1 week

## 2019-01-30 NOTE — ASSESSMENT & PLAN NOTE
· Chronic and stable  · Coumadin held due to surgery that was done on 01/25/2019 and patient did have a significant amount of bleeding from the groin site  · Patient received 2 units of FFP and vitamin K prior to surgery and also FFP/vitamin K after surgery due to postop bleeding  · Resume Coumadin as patient has been cleared by surgery  · Check inr tomorrow

## 2019-01-30 NOTE — SOCIAL WORK
Chart reviewed by case management, pt has been accepted by [facility],, home vac was delivered to the pt's room, I called [facility] and made them aware that the pt's d/c plan has changed, [facility] will come back and take back the home Shira Haskins was called at [address] [place] [address] and she was made aware I called the doctor and th ept does not need iv antibiotics and he does need the the wound vac at two sites and that they need to order an adapter so one vac can be used, I spoke with the son and he plans to transport the pt tomorrow, rn is aware the pt needs a boot, cm will continue to follow, d/c plan was discussed at care coordination rounds today

## 2019-01-30 NOTE — PLAN OF CARE
Problem: DISCHARGE PLANNING - CARE MANAGEMENT  Goal: Discharge to post-acute care or home with appropriate resources  INTERVENTIONS:  - Conduct assessment to determine patient/family and health care team treatment goals, and need for post-acute services based on payer coverage, community resources, and patient preferences, and barriers to discharge  - Address psychosocial, clinical, and financial barriers to discharge as identified in assessment in conjunction with the patient/family and health care team  - Arrange appropriate level of post-acute services according to patient's   needs and preference and payer coverage in collaboration with the physician and health care team  - Communicate with and update the patient/family, physician, and health care team regarding progress on the discharge plan  - Arrange appropriate transportation to post-acute venues  - pt to be d/c to snf  Outcome: Progressing

## 2019-01-31 ENCOUNTER — EPISODE CHANGES (OUTPATIENT)
Dept: CASE MANAGEMENT | Facility: HOSPITAL | Age: 84
End: 2019-01-31

## 2019-01-31 VITALS
SYSTOLIC BLOOD PRESSURE: 113 MMHG | BODY MASS INDEX: 26.68 KG/M2 | TEMPERATURE: 97.4 F | HEIGHT: 67 IN | DIASTOLIC BLOOD PRESSURE: 62 MMHG | HEART RATE: 75 BPM | WEIGHT: 170 LBS | OXYGEN SATURATION: 97 % | RESPIRATION RATE: 18 BRPM

## 2019-01-31 RX ADMIN — AMLODIPINE BESYLATE 10 MG: 5 TABLET ORAL at 09:02

## 2019-01-31 RX ADMIN — AMPICILLIN SODIUM AND SULBACTAM SODIUM 3 G: 2; 1 INJECTION, POWDER, FOR SOLUTION INTRAMUSCULAR; INTRAVENOUS at 00:10

## 2019-01-31 RX ADMIN — PREDNISOLONE ACETATE 1 DROP: 10 SUSPENSION/ DROPS OPHTHALMIC at 09:02

## 2019-01-31 RX ADMIN — TIMOLOL MALEATE 1 DROP: 5 SOLUTION OPHTHALMIC at 09:03

## 2019-01-31 RX ADMIN — ASPIRIN 81 MG 81 MG: 81 TABLET ORAL at 09:02

## 2019-01-31 RX ADMIN — ATROPINE SULFATE 1 DROP: 10 SOLUTION/ DROPS OPHTHALMIC at 09:02

## 2019-01-31 RX ADMIN — VITAMIN D, TAB 1000IU (100/BT) 1000 UNITS: 25 TAB at 09:02

## 2019-01-31 RX ADMIN — LOSARTAN POTASSIUM 50 MG: 50 TABLET, FILM COATED ORAL at 09:02

## 2019-01-31 RX ADMIN — DOCUSATE SODIUM 100 MG: 100 CAPSULE, LIQUID FILLED ORAL at 09:02

## 2019-01-31 RX ADMIN — AMPICILLIN SODIUM AND SULBACTAM SODIUM 3 G: 2; 1 INJECTION, POWDER, FOR SOLUTION INTRAMUSCULAR; INTRAVENOUS at 06:01

## 2019-01-31 NOTE — NURSING NOTE
Spoke to both Dr Mark Cook and Dr Ken Quiroga to confirm that wound vac tubing may be disconnected, clamped, and dressed with dry gauze and tape for trasnfer to [address] [place] [address]  Pt will be transported by family  Will continue to monitor

## 2019-01-31 NOTE — PLAN OF CARE
Problem: DISCHARGE PLANNING - CARE MANAGEMENT  Goal: Discharge to post-acute care or home with appropriate resources  INTERVENTIONS:  - Conduct assessment to determine patient/family and health care team treatment goals, and need for post-acute services based on payer coverage, community resources, and patient preferences, and barriers to discharge  - Address psychosocial, clinical, and financial barriers to discharge as identified in assessment in conjunction with the patient/family and health care team  - Arrange appropriate level of post-acute services according to patient's   needs and preference and payer coverage in collaboration with the physician and health care team  - Communicate with and update the patient/family, physician, and health care team regarding progress on the discharge plan  - Arrange appropriate transportation to post-acute venues  - pt to be d/c to snf   Outcome: Completed Date Met: 01/31/19

## 2019-01-31 NOTE — SOCIAL WORK
Chart reviewed by case management , d/c order was written, I called Meryle Nares at [employer] and they did receive the y adapter for the wound vac, rn called dr Jessica Vasquez to find out what boot was needed, a surgical shoe was obtained, I called the pt;s son and he plans to transport the pt to [facility], I called [facility] @[phone] to let them know we do not need the home vac and I spoke with Stefano Felty ref# for the call [phone], she stated they will come pick the vac up from the cm office and the box was not opened and the pt will not be charged, rn was giventthe information for report, pt and family in agreement with the d/c and d/c plan to [place], d/c plan was discussed at care coordination rounds today

## 2019-01-31 NOTE — DISCHARGE INSTR - AVS FIRST PAGE
Wound VAC should be continued for about 1-2 weeks at which point the wound may be managed with wet to dry dressing changes daily  You should follow up with Dr Phyllis Gill in about 2 weeks to re-assess the wound

## 2019-01-31 NOTE — DISCHARGE INSTR - OTHER ORDERS
Wound VAC to L thigh: When changed, replace sponges exactly how they were placed by MD  Wound VAC to L foot: When changed, replace sponges exactly how they were placed by MD

## 2019-02-06 ENCOUNTER — OFFICE VISIT (OUTPATIENT)
Dept: SURGERY | Facility: CLINIC | Age: 84
End: 2019-02-06
Payer: MEDICARE

## 2019-02-06 DIAGNOSIS — S31.109D WOUND OF LEFT GROIN, SUBSEQUENT ENCOUNTER: Primary | ICD-10-CM

## 2019-02-06 PROCEDURE — 99213 OFFICE O/P EST LOW 20 MIN: CPT | Performed by: SURGERY

## 2019-02-06 RX ORDER — ZOLPIDEM TARTRATE 5 MG/1
TABLET ORAL DAILY
COMMUNITY

## 2019-02-06 NOTE — ASSESSMENT & PLAN NOTE
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

## 2019-02-06 NOTE — LETTER
February 6, 2019     [address]    Patient: Brenda Patiño   YOB: 1931   Date of Visit: 2/6/2019       Dear Dr Desi Bermudez: Thank you for referring Brenda Patiño to me for evaluation  Below are my notes for this consultation  If you have questions, please do not hesitate to call me  I look forward to following your patient along with you  Sincerely,        Thomas Robledo MD        CC: No Recipients  Thomas Robledo MD  2/6/2019  2:38 PM  Sign at close encounter  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  Today the patient denies complaints referable to the left groin wound  It is clean and granulating  I have discontinued the wound VAC and recommended once daily wet to dry dressing changes with saline moistened gauze  I look for to seeing him back in 2 weeks time for another wound check  Diagnoses and all orders for this visit:    Wound of left groin, subsequent encounter  -     Wound vacum dressing application      ; Future    Other orders  -     zolpidem (AMBIEN) 5 mg tablet; Daily          Subjective:      Patient ID: Brenda Patiño is a 80 y o  male  Nurse Note: Patient is here today in po follow up I & D of left thigh and left debridement 01-  The patient's wound vac was removed from his left thigh and left food  The wound areas look good and are granulating nicely  The patient stated that the area are itchy otherwise he denied any new problems or concerns    Radha Pederson MA      Wound Check         The following portions of the patient's history were reviewed and updated as appropriate: allergies, current medications, past family history, past medical history, past social history, past surgical history and problem list     Review of Systems   All other systems reviewed and are negative  Objective: There were no vitals taken for this visit  Physical Exam   Constitutional: He is oriented to person, place, and time  He appears well-developed and well-nourished  HENT:   Head: Normocephalic and atraumatic  Eyes: Pupils are equal, round, and reactive to light  Conjunctivae are normal    Neck: Normal range of motion  Neck supple  Cardiovascular: Normal rate and regular rhythm  Pulmonary/Chest: Effort normal and breath sounds normal    Abdominal: Soft  Bowel sounds are normal    Musculoskeletal: Normal range of motion  Neurological: He is alert and oriented to person, place, and time  Skin: Skin is warm and dry  Left groin wound clean and granulating  The wound VAC was discontinued and a wet-to-dry dressing applied  The left foot wound is clean and granulating  The dimensions are 5 cm x 4 cm x 0 5 cm in depth  A wound VAC was reapplied with good seal   The patient tolerated the procedure well  Psychiatric: His behavior is normal  Judgment and thought content normal    Nursing note and vitals reviewed  Debridement     Date/Time 2/6/2019 2:37 PM     Performed by  Sasha Holden     Authorized by Sasha Holden      Procedure Details   Procedure Notes: CPT code # 77001 the left foot wound VAC was taken down  The wound inspected  Is clean and granulating nicely  The wound dimensions are 5 x 4 cm x 0 5 cm in depth  Black foam reapplied to the wound and an airtight dressing applied with good seal   The patient tolerated the procedure well

## 2019-02-06 NOTE — PROGRESS NOTES
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  Today the patient denies complaints referable to the left groin wound  It is clean and granulating  I have discontinued the wound VAC and recommended once daily wet to dry dressing changes with saline moistened gauze  I look for to seeing him back in 2 weeks time for another wound check  Diagnoses and all orders for this visit:    Wound of left groin, subsequent encounter  -     Wound vacum dressing application      ; Future    Other orders  -     zolpidem (AMBIEN) 5 mg tablet; Daily          Subjective:      Patient ID: Cassie Sims is a 80 y o  male  Nurse Note: Patient is here today in po follow up I & D of left thigh and left debridement 01-  The patient's wound vac was removed from his left thigh and left food  The wound areas look good and are granulating nicely  The patient stated that the area are itchy otherwise he denied any new problems or concerns  Radha Pederson MA      Wound Check         The following portions of the patient's history were reviewed and updated as appropriate: allergies, current medications, past family history, past medical history, past social history, past surgical history and problem list     Review of Systems   All other systems reviewed and are negative  Objective: There were no vitals taken for this visit  Physical Exam   Constitutional: He is oriented to person, place, and time  He appears well-developed and well-nourished  HENT:   Head: Normocephalic and atraumatic  Eyes: Pupils are equal, round, and reactive to light  Conjunctivae are normal    Neck: Normal range of motion  Neck supple  Cardiovascular: Normal rate and regular rhythm  Pulmonary/Chest: Effort normal and breath sounds normal    Abdominal: Soft   Bowel sounds are normal    Musculoskeletal: Normal range of motion  Neurological: He is alert and oriented to person, place, and time  Skin: Skin is warm and dry  Left groin wound clean and granulating  The wound VAC was discontinued and a wet-to-dry dressing applied  The left foot wound is clean and granulating  The dimensions are 5 cm x 4 cm x 0 5 cm in depth  A wound VAC was reapplied with good seal   The patient tolerated the procedure well  Psychiatric: His behavior is normal  Judgment and thought content normal    Nursing note and vitals reviewed  Debridement     Date/Time 2/6/2019 2:37 PM     Performed by  Blayne Hobbs     Authorized by Blayne Hobbs      Procedure Details   Procedure Notes: CPT code # 94233 the left foot wound VAC was taken down  The wound inspected  Is clean and granulating nicely  The wound dimensions are 5 x 4 cm x 0 5 cm in depth  Black foam reapplied to the wound and an airtight dressing applied with good seal   The patient tolerated the procedure well

## 2019-02-07 ENCOUNTER — APPOINTMENT (OUTPATIENT)
Dept: WOUND CARE | Facility: CLINIC | Age: 84
End: 2019-02-07
Payer: MEDICARE

## 2019-02-07 PROCEDURE — 99213 OFFICE O/P EST LOW 20 MIN: CPT

## 2019-02-07 PROCEDURE — 97597 DBRDMT OPN WND 1ST 20 CM/<: CPT

## 2019-02-11 ENCOUNTER — PATIENT OUTREACH (OUTPATIENT)
Dept: CASE MANAGEMENT | Facility: OTHER | Age: 84
End: 2019-02-11

## 2019-02-14 ENCOUNTER — APPOINTMENT (OUTPATIENT)
Dept: WOUND CARE | Facility: CLINIC | Age: 84
End: 2019-02-14
Payer: COMMERCIAL

## 2019-02-14 PROCEDURE — 97597 DBRDMT OPN WND 1ST 20 CM/<: CPT

## 2019-02-19 ENCOUNTER — TRANSCRIBE ORDERS (OUTPATIENT)
Dept: ADMINISTRATIVE | Facility: HOSPITAL | Age: 84
End: 2019-02-19

## 2019-02-19 ENCOUNTER — PATIENT OUTREACH (OUTPATIENT)
Dept: CASE MANAGEMENT | Facility: OTHER | Age: 84
End: 2019-02-19

## 2019-02-19 NOTE — PROGRESS NOTES
CM called [address] [place] [address] [phone], spoke with Flaca Rodriguez who stated patient discharged to home on 2/15/19  CM called patient, left message with contact information, with request for return phone call

## 2019-02-21 ENCOUNTER — APPOINTMENT (OUTPATIENT)
Dept: WOUND CARE | Facility: CLINIC | Age: 84
End: 2019-02-21
Payer: MEDICARE

## 2019-02-21 PROCEDURE — 15275 SKIN SUB GRAFT FACE/NK/HF/G: CPT

## 2019-02-22 ENCOUNTER — APPOINTMENT (OUTPATIENT)
Dept: LAB | Facility: HOSPITAL | Age: 84
End: 2019-02-22
Attending: INTERNAL MEDICINE
Payer: MEDICARE

## 2019-02-22 ENCOUNTER — TRANSCRIBE ORDERS (OUTPATIENT)
Dept: ADMINISTRATIVE | Facility: HOSPITAL | Age: 84
End: 2019-02-22

## 2019-02-22 DIAGNOSIS — Z79.01 ENCOUNTER FOR MONITORING COUMADIN THERAPY: Primary | ICD-10-CM

## 2019-02-22 DIAGNOSIS — Z51.81 ENCOUNTER FOR MONITORING COUMADIN THERAPY: Primary | ICD-10-CM

## 2019-02-22 DIAGNOSIS — Z79.01 ENCOUNTER FOR MONITORING COUMADIN THERAPY: ICD-10-CM

## 2019-02-22 DIAGNOSIS — Z51.81 ENCOUNTER FOR MONITORING COUMADIN THERAPY: ICD-10-CM

## 2019-02-22 LAB
INR PPP: 2 (ref 0.9–1.5)
PROTHROMBIN TIME: 23.4 SECONDS (ref 10.2–13)

## 2019-02-22 PROCEDURE — 36415 COLL VENOUS BLD VENIPUNCTURE: CPT

## 2019-02-22 PROCEDURE — 85610 PROTHROMBIN TIME: CPT

## 2019-02-27 ENCOUNTER — OFFICE VISIT (OUTPATIENT)
Dept: SURGERY | Facility: CLINIC | Age: 84
End: 2019-02-27
Payer: MEDICARE

## 2019-02-27 DIAGNOSIS — S91.302A WOUND OF LEFT FOOT: ICD-10-CM

## 2019-02-27 DIAGNOSIS — S31.109D WOUND OF LEFT GROIN, SUBSEQUENT ENCOUNTER: Primary | ICD-10-CM

## 2019-02-27 PROCEDURE — 1123F ACP DISCUSS/DSCN MKR DOCD: CPT | Performed by: PHYSICIAN ASSISTANT

## 2019-02-27 PROCEDURE — 99212 OFFICE O/P EST SF 10 MIN: CPT | Performed by: PHYSICIAN ASSISTANT

## 2019-02-27 RX ORDER — CHOLECALCIFEROL (VITAMIN D3) 25 MCG
CAPSULE ORAL DAILY
COMMUNITY

## 2019-02-27 RX ORDER — DIPHENHYDRAMINE HYDROCHLORIDE 25 MG/1
CAPSULE ORAL DAILY
COMMUNITY

## 2019-02-27 RX ORDER — PREDNISOLONE ACETATE 10 MG/ML
SUSPENSION/ DROPS OPHTHALMIC
COMMUNITY

## 2019-02-27 RX ORDER — POTASSIUM CHLORIDE 1.5 G/1.77G
POWDER, FOR SOLUTION ORAL DAILY
COMMUNITY
End: 2019-10-16 | Stop reason: HOSPADM

## 2019-02-27 RX ORDER — CHLORTHALIDONE 25 MG/1
TABLET ORAL DAILY
COMMUNITY
End: 2019-10-16 | Stop reason: HOSPADM

## 2019-02-27 RX ORDER — FUROSEMIDE 40 MG/1
TABLET ORAL DAILY
COMMUNITY

## 2019-02-27 NOTE — PROGRESS NOTES
Progress Note - General Surgery   Chago Pinto 80 y o  male MRN: [id number]  Encounter: [id number]    Assessment/Plan    Wound of left groin  The patient's groin wound is well healed  There is granulomatous tissue noted  Will transition to daily cleansing and dry bandage with triple antibiotic ointment  If hypertrophic tissue does not resolve or results in bleeding will treat with silver nitrate cauterization  Questions answered, will see as needed  Wound of left foot  Left foot wound healing well  Cleansed and dressed with oil emulsion dressing, gauze, cling, ace wrap  Should continue follow up at wound care center  Questions answered, will see as needed  Diagnoses and all orders for this visit:    Wound of left groin, subsequent encounter    Wound of left foot    Other orders  -     Cholecalciferol (VITAMIN D-3) 1000 units CAPS; Daily  -     furosemide (LASIX) 40 mg tablet; Daily  -     prednisoLONE acetate (PRED FORTE) 1 % ophthalmic suspension; Pred Forte 1 % eye drops,suspension   INSTILL 1 DROP INTO RIGHT EYE DAILY  -     potassium chloride (KLOR-CON) 20 mEq packet; Daily  -     Niacin ER (SLO-NIACIN) 750 MG TBCR; Daily  -     Pyridoxine HCl (VITAMIN B-6) 25 MG tablet; Daily        Subjective     02- Patient is here today in follow up I & D of left thigh and left foot debridement  The wound vac was discontinued and he stated that the left thigh area looks good and is healing and is still seeing wound care for his left foot  The dressing was removed from the patient's left thigh incision area  The thigh area ishealing and does have minimal drainage at the area  The ace wrap was removed from his left foot and the dressing was removed  The left foot has some yellowish sludge on it, it might be from the dressing  The wound area looks better and is healing, the are around he wound is pink  Patient denied any new problems or concerns    Marga Irwin 79 yo M hx melanoma left foot here for follow s/p I&D left groin incision and drainage from previous SLNB and 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  Resides at home where he has been managing his groin wound  Sees wound care once weekly  Reports improvement of wound appearance  Has been using wet to dry dressing changes daily  No significant drainage, pain, redness  Review of Systems   Constitutional: Negative for chills and fever  Respiratory: Negative for shortness of breath  Cardiovascular: Negative for chest pain  Gastrointestinal: Negative for abdominal pain  Skin: Positive for wound (left groin, left foot)  Negative for rash  Neurological: Negative for headaches  The following portions of the patient's history were reviewed and updated as appropriate: allergies, current medications, past family history, past medical history, past social history, past surgical history and problem list     Objective      There were no vitals taken for this visit  Physical Exam   Constitutional: He is oriented to person, place, and time  He appears well-developed and well-nourished  No distress  HENT:   Head: Normocephalic and atraumatic  Eyes: Pupils are equal, round, and reactive to light  Conjunctivae and EOM are normal    Neck: Normal range of motion  Pulmonary/Chest: No respiratory distress  Musculoskeletal: Normal range of motion  Neurological: He is alert and oriented to person, place, and time  Skin: Skin is warm and dry  Capillary refill takes less than 2 seconds  He is not diaphoretic  Psychiatric: He has a normal mood and affect   His behavior is normal        Signature:  Hong Vaz PA-C  Date: 2/27/2019 Time: 11:47 AM

## 2019-02-27 NOTE — ASSESSMENT & PLAN NOTE
The patient's groin wound is well healed  There is granulomatous tissue noted  Will transition to daily cleansing and dry bandage with triple antibiotic ointment  If hypertrophic tissue does not resolve or results in bleeding will treat with silver nitrate cauterization  Questions answered, will see as needed

## 2019-02-27 NOTE — ASSESSMENT & PLAN NOTE
Left foot wound healing well  Cleansed and dressed with oil emulsion dressing, gauze, cling, ace wrap  Should continue follow up at wound care center  Questions answered, will see as needed

## 2019-02-28 ENCOUNTER — APPOINTMENT (OUTPATIENT)
Dept: WOUND CARE | Facility: CLINIC | Age: 84
End: 2019-02-28
Payer: MEDICARE

## 2019-02-28 PROCEDURE — 97597 DBRDMT OPN WND 1ST 20 CM/<: CPT

## 2019-03-07 ENCOUNTER — APPOINTMENT (OUTPATIENT)
Dept: WOUND CARE | Facility: CLINIC | Age: 84
End: 2019-03-07
Payer: MEDICARE

## 2019-03-07 PROCEDURE — 15275 SKIN SUB GRAFT FACE/NK/HF/G: CPT

## 2019-03-14 ENCOUNTER — APPOINTMENT (OUTPATIENT)
Dept: WOUND CARE | Facility: CLINIC | Age: 84
End: 2019-03-14
Payer: MEDICARE

## 2019-03-14 PROCEDURE — 15275 SKIN SUB GRAFT FACE/NK/HF/G: CPT

## 2019-03-21 ENCOUNTER — APPOINTMENT (OUTPATIENT)
Dept: LAB | Facility: HOSPITAL | Age: 84
End: 2019-03-21
Attending: INTERNAL MEDICINE
Payer: MEDICARE

## 2019-03-21 ENCOUNTER — APPOINTMENT (OUTPATIENT)
Dept: WOUND CARE | Facility: CLINIC | Age: 84
End: 2019-03-21
Payer: MEDICARE

## 2019-03-21 DIAGNOSIS — C43.72 MALIGNANT MELANOMA OF LEFT FOOT (HCC): ICD-10-CM

## 2019-03-21 DIAGNOSIS — Z79.01 ENCOUNTER FOR MONITORING COUMADIN THERAPY: ICD-10-CM

## 2019-03-21 DIAGNOSIS — Z51.81 ENCOUNTER FOR MONITORING COUMADIN THERAPY: ICD-10-CM

## 2019-03-21 LAB
ANION GAP SERPL CALCULATED.3IONS-SCNC: 7 MMOL/L (ref 4–13)
BUN SERPL-MCNC: 18 MG/DL (ref 7–25)
CALCIUM SERPL-MCNC: 8.9 MG/DL (ref 8.6–10.5)
CHLORIDE SERPL-SCNC: 105 MMOL/L (ref 98–107)
CO2 SERPL-SCNC: 29 MMOL/L (ref 21–31)
CREAT SERPL-MCNC: 1.13 MG/DL (ref 0.7–1.3)
GFR SERPL CREATININE-BSD FRML MDRD: 58 ML/MIN/1.73SQ M
GLUCOSE SERPL-MCNC: 136 MG/DL (ref 65–99)
INR PPP: 2.22 (ref 0.9–1.5)
POTASSIUM SERPL-SCNC: 3.9 MMOL/L (ref 3.5–5.5)
PROTHROMBIN TIME: 26 SECONDS (ref 10.2–13)
SODIUM SERPL-SCNC: 141 MMOL/L (ref 134–143)

## 2019-03-21 PROCEDURE — 36415 COLL VENOUS BLD VENIPUNCTURE: CPT

## 2019-03-21 PROCEDURE — 97597 DBRDMT OPN WND 1ST 20 CM/<: CPT

## 2019-03-21 PROCEDURE — 85610 PROTHROMBIN TIME: CPT

## 2019-03-21 PROCEDURE — 80048 BASIC METABOLIC PNL TOTAL CA: CPT

## 2019-03-28 ENCOUNTER — APPOINTMENT (OUTPATIENT)
Dept: WOUND CARE | Facility: CLINIC | Age: 84
End: 2019-03-28
Payer: MEDICARE

## 2019-03-28 PROCEDURE — 97597 DBRDMT OPN WND 1ST 20 CM/<: CPT

## 2019-04-04 ENCOUNTER — APPOINTMENT (OUTPATIENT)
Dept: WOUND CARE | Facility: CLINIC | Age: 84
End: 2019-04-04
Payer: MEDICARE

## 2019-04-04 PROCEDURE — 97597 DBRDMT OPN WND 1ST 20 CM/<: CPT

## 2019-04-11 ENCOUNTER — APPOINTMENT (OUTPATIENT)
Dept: WOUND CARE | Facility: CLINIC | Age: 84
End: 2019-04-11
Payer: MEDICARE

## 2019-04-11 PROCEDURE — 97597 DBRDMT OPN WND 1ST 20 CM/<: CPT

## 2019-04-18 ENCOUNTER — APPOINTMENT (OUTPATIENT)
Dept: WOUND CARE | Facility: CLINIC | Age: 84
End: 2019-04-18
Payer: MEDICARE

## 2019-04-18 ENCOUNTER — APPOINTMENT (OUTPATIENT)
Dept: LAB | Facility: HOSPITAL | Age: 84
End: 2019-04-18
Attending: INTERNAL MEDICINE
Payer: MEDICARE

## 2019-04-18 ENCOUNTER — TRANSCRIBE ORDERS (OUTPATIENT)
Dept: ADMINISTRATIVE | Facility: HOSPITAL | Age: 84
End: 2019-04-18

## 2019-04-18 DIAGNOSIS — Z79.01 ENCOUNTER FOR MONITORING COUMADIN THERAPY: Primary | ICD-10-CM

## 2019-04-18 DIAGNOSIS — Z51.81 ENCOUNTER FOR MONITORING COUMADIN THERAPY: ICD-10-CM

## 2019-04-18 DIAGNOSIS — Z79.01 ENCOUNTER FOR MONITORING COUMADIN THERAPY: ICD-10-CM

## 2019-04-18 DIAGNOSIS — Z51.81 ENCOUNTER FOR MONITORING COUMADIN THERAPY: Primary | ICD-10-CM

## 2019-04-18 LAB
INR PPP: 2.77 (ref 0.9–1.5)
PROTHROMBIN TIME: 32.5 SECONDS (ref 10.2–13)

## 2019-04-18 PROCEDURE — 85610 PROTHROMBIN TIME: CPT

## 2019-04-18 PROCEDURE — 97597 DBRDMT OPN WND 1ST 20 CM/<: CPT

## 2019-04-18 PROCEDURE — 36415 COLL VENOUS BLD VENIPUNCTURE: CPT

## 2019-04-25 ENCOUNTER — APPOINTMENT (OUTPATIENT)
Dept: WOUND CARE | Facility: CLINIC | Age: 84
End: 2019-04-25
Payer: MEDICARE

## 2019-04-25 PROCEDURE — 97597 DBRDMT OPN WND 1ST 20 CM/<: CPT

## 2019-04-26 ENCOUNTER — HOSPITAL ENCOUNTER (OUTPATIENT)
Dept: CT IMAGING | Facility: HOSPITAL | Age: 84
Discharge: HOME | End: 2019-04-26
Payer: MEDICARE

## 2019-04-26 DIAGNOSIS — C43.72 MALIGNANT MELANOMA OF LEFT FOOT (HCC): ICD-10-CM

## 2019-04-26 PROCEDURE — 74177 CT ABD & PELVIS W/CONTRAST: CPT

## 2019-04-26 PROCEDURE — 71260 CT THORAX DX C+: CPT

## 2019-04-26 RX ADMIN — IOHEXOL 100 ML: 350 INJECTION, SOLUTION INTRAVENOUS at 09:06

## 2019-05-09 ENCOUNTER — APPOINTMENT (OUTPATIENT)
Dept: WOUND CARE | Facility: CLINIC | Age: 84
End: 2019-05-09
Payer: MEDICARE

## 2019-05-09 PROCEDURE — 97597 DBRDMT OPN WND 1ST 20 CM/<: CPT

## 2019-05-16 ENCOUNTER — APPOINTMENT (OUTPATIENT)
Dept: LAB | Facility: HOSPITAL | Age: 84
End: 2019-05-16
Attending: INTERNAL MEDICINE
Payer: MEDICARE

## 2019-05-16 ENCOUNTER — TRANSCRIBE ORDERS (OUTPATIENT)
Dept: ADMINISTRATIVE | Facility: HOSPITAL | Age: 84
End: 2019-05-16

## 2019-05-16 DIAGNOSIS — Z51.81 ENCOUNTER FOR MONITORING COUMADIN THERAPY: Primary | ICD-10-CM

## 2019-05-16 DIAGNOSIS — Z79.01 ENCOUNTER FOR MONITORING COUMADIN THERAPY: Primary | ICD-10-CM

## 2019-05-16 DIAGNOSIS — Z51.81 ENCOUNTER FOR MONITORING COUMADIN THERAPY: ICD-10-CM

## 2019-05-16 DIAGNOSIS — Z79.01 ENCOUNTER FOR MONITORING COUMADIN THERAPY: ICD-10-CM

## 2019-05-16 LAB
INR PPP: 1.64 (ref 0.9–1.5)
PROTHROMBIN TIME: 19 SECONDS (ref 10.2–13)

## 2019-05-16 PROCEDURE — 85610 PROTHROMBIN TIME: CPT

## 2019-05-16 PROCEDURE — 36415 COLL VENOUS BLD VENIPUNCTURE: CPT

## 2019-05-23 ENCOUNTER — APPOINTMENT (OUTPATIENT)
Dept: WOUND CARE | Facility: CLINIC | Age: 84
End: 2019-05-23
Payer: MEDICARE

## 2019-05-23 PROCEDURE — 97597 DBRDMT OPN WND 1ST 20 CM/<: CPT

## 2019-05-31 PROBLEM — Z08 ENCOUNTER FOR FOLLOW-UP EXAMINATION AFTER COMPLETED TREATMENT FOR MALIGNANT NEOPLASM: Status: ACTIVE | Noted: 2019-05-31

## 2019-05-31 PROBLEM — Z85.820 HISTORY OF MALIGNANT MELANOMA: Status: ACTIVE | Noted: 2018-11-02

## 2019-06-04 PROBLEM — Z85.820 ENCOUNTER FOR FOLLOW-UP SURVEILLANCE OF MELANOMA: Status: ACTIVE | Noted: 2019-05-31

## 2019-06-05 ENCOUNTER — OFFICE VISIT (OUTPATIENT)
Dept: SURGICAL ONCOLOGY | Facility: HOSPITAL | Age: 84
End: 2019-06-05
Payer: MEDICARE

## 2019-06-05 VITALS
HEART RATE: 70 BPM | BODY MASS INDEX: 27.31 KG/M2 | SYSTOLIC BLOOD PRESSURE: 150 MMHG | WEIGHT: 174 LBS | DIASTOLIC BLOOD PRESSURE: 68 MMHG | HEIGHT: 67 IN | TEMPERATURE: 97.1 F | RESPIRATION RATE: 16 BRPM

## 2019-06-05 DIAGNOSIS — Z85.820 ENCOUNTER FOR FOLLOW-UP SURVEILLANCE OF MELANOMA: Primary | ICD-10-CM

## 2019-06-05 DIAGNOSIS — Z08 ENCOUNTER FOR FOLLOW-UP SURVEILLANCE OF MELANOMA: Primary | ICD-10-CM

## 2019-06-05 DIAGNOSIS — Z85.820 HISTORY OF MALIGNANT MELANOMA: ICD-10-CM

## 2019-06-05 PROCEDURE — 99214 OFFICE O/P EST MOD 30 MIN: CPT | Performed by: SURGERY

## 2019-06-06 ENCOUNTER — APPOINTMENT (OUTPATIENT)
Dept: WOUND CARE | Facility: CLINIC | Age: 84
End: 2019-06-06
Payer: MEDICARE

## 2019-06-06 PROCEDURE — 99212 OFFICE O/P EST SF 10 MIN: CPT

## 2019-06-12 ENCOUNTER — APPOINTMENT (OUTPATIENT)
Dept: LAB | Facility: HOSPITAL | Age: 84
End: 2019-06-12
Attending: INTERNAL MEDICINE
Payer: MEDICARE

## 2019-06-12 ENCOUNTER — TRANSCRIBE ORDERS (OUTPATIENT)
Dept: ADMINISTRATIVE | Facility: HOSPITAL | Age: 84
End: 2019-06-12

## 2019-06-12 DIAGNOSIS — Z92.29 HISTORY OF COUMADIN THERAPY: Primary | ICD-10-CM

## 2019-06-12 DIAGNOSIS — Z92.29 HISTORY OF COUMADIN THERAPY: ICD-10-CM

## 2019-06-12 LAB
INR PPP: 1.71 (ref 0.9–1.5)
PROTHROMBIN TIME: 20 SECONDS (ref 10.2–13)

## 2019-06-12 PROCEDURE — 36415 COLL VENOUS BLD VENIPUNCTURE: CPT

## 2019-06-12 PROCEDURE — 85610 PROTHROMBIN TIME: CPT

## 2019-07-12 ENCOUNTER — TRANSCRIBE ORDERS (OUTPATIENT)
Dept: LAB | Facility: HOSPITAL | Age: 84
End: 2019-07-12

## 2019-07-12 ENCOUNTER — APPOINTMENT (OUTPATIENT)
Dept: LAB | Facility: HOSPITAL | Age: 84
End: 2019-07-12
Attending: INTERNAL MEDICINE
Payer: MEDICARE

## 2019-07-12 DIAGNOSIS — Z79.01 ENCOUNTER FOR MONITORING COUMADIN THERAPY: ICD-10-CM

## 2019-07-12 DIAGNOSIS — Z51.81 ENCOUNTER FOR MONITORING COUMADIN THERAPY: Primary | ICD-10-CM

## 2019-07-12 DIAGNOSIS — Z79.01 ENCOUNTER FOR MONITORING COUMADIN THERAPY: Primary | ICD-10-CM

## 2019-07-12 DIAGNOSIS — Z51.81 ENCOUNTER FOR MONITORING COUMADIN THERAPY: ICD-10-CM

## 2019-07-12 LAB
INR PPP: 2.24 (ref 0.9–1.5)
PROTHROMBIN TIME: 26.2 SECONDS (ref 10.2–13)

## 2019-07-12 PROCEDURE — 36415 COLL VENOUS BLD VENIPUNCTURE: CPT

## 2019-07-12 PROCEDURE — 85610 PROTHROMBIN TIME: CPT

## 2019-08-12 ENCOUNTER — TRANSCRIBE ORDERS (OUTPATIENT)
Dept: LAB | Facility: HOSPITAL | Age: 84
End: 2019-08-12

## 2019-08-12 ENCOUNTER — APPOINTMENT (OUTPATIENT)
Dept: LAB | Facility: HOSPITAL | Age: 84
End: 2019-08-12
Attending: INTERNAL MEDICINE
Payer: MEDICARE

## 2019-08-12 DIAGNOSIS — Z79.01 ENCOUNTER FOR MONITORING COUMADIN THERAPY: ICD-10-CM

## 2019-08-12 DIAGNOSIS — Z51.81 ENCOUNTER FOR MONITORING COUMADIN THERAPY: ICD-10-CM

## 2019-08-12 DIAGNOSIS — Z79.01 ENCOUNTER FOR MONITORING COUMADIN THERAPY: Primary | ICD-10-CM

## 2019-08-12 DIAGNOSIS — Z51.81 ENCOUNTER FOR MONITORING COUMADIN THERAPY: Primary | ICD-10-CM

## 2019-08-12 LAB
INR PPP: 1.7 (ref 0.9–1.5)
PROTHROMBIN TIME: 19.8 SECONDS (ref 10.2–13)

## 2019-08-12 PROCEDURE — 36415 COLL VENOUS BLD VENIPUNCTURE: CPT

## 2019-08-12 PROCEDURE — 85610 PROTHROMBIN TIME: CPT

## 2019-08-13 ENCOUNTER — TELEPHONE (OUTPATIENT)
Dept: SURGICAL ONCOLOGY | Facility: CLINIC | Age: 84
End: 2019-08-13

## 2019-08-13 NOTE — TELEPHONE ENCOUNTER
Called patient to reschedule upcoming appointment at [facility] office  Explained to the patient that Dr Phyllis Sanchez will no longer be traveling to the [facility] location  Appointment rescheduled to the [facility] [place]  Appointment card and directions mailed to the patient

## 2019-09-05 ENCOUNTER — APPOINTMENT (OUTPATIENT)
Dept: LAB | Facility: HOSPITAL | Age: 84
End: 2019-09-05
Attending: INTERNAL MEDICINE
Payer: MEDICARE

## 2019-09-05 ENCOUNTER — TRANSCRIBE ORDERS (OUTPATIENT)
Dept: LAB | Facility: HOSPITAL | Age: 84
End: 2019-09-05

## 2019-09-05 DIAGNOSIS — Z79.01 LONG TERM (CURRENT) USE OF ANTICOAGULANTS: ICD-10-CM

## 2019-09-05 DIAGNOSIS — Z79.01 LONG TERM (CURRENT) USE OF ANTICOAGULANTS: Primary | ICD-10-CM

## 2019-09-05 LAB
INR PPP: 3.62 (ref 0.9–1.5)
PROTHROMBIN TIME: 42.5 SECONDS (ref 10.2–13)

## 2019-09-05 PROCEDURE — 85610 PROTHROMBIN TIME: CPT

## 2019-09-05 PROCEDURE — 36415 COLL VENOUS BLD VENIPUNCTURE: CPT

## 2019-09-10 ENCOUNTER — APPOINTMENT (OUTPATIENT)
Dept: LAB | Facility: HOSPITAL | Age: 84
End: 2019-09-10
Attending: INTERNAL MEDICINE
Payer: MEDICARE

## 2019-09-10 ENCOUNTER — TRANSCRIBE ORDERS (OUTPATIENT)
Dept: LAB | Facility: HOSPITAL | Age: 84
End: 2019-09-10

## 2019-09-10 DIAGNOSIS — Z79.01 LONG TERM (CURRENT) USE OF ANTICOAGULANTS: Primary | ICD-10-CM

## 2019-09-10 DIAGNOSIS — Z79.01 LONG TERM (CURRENT) USE OF ANTICOAGULANTS: ICD-10-CM

## 2019-09-10 LAB
INR PPP: 2.26 (ref 0.9–1.5)
PROTHROMBIN TIME: 26.4 SECONDS (ref 10.2–13)

## 2019-09-10 PROCEDURE — 36415 COLL VENOUS BLD VENIPUNCTURE: CPT

## 2019-09-10 PROCEDURE — 85610 PROTHROMBIN TIME: CPT

## 2019-09-30 ENCOUNTER — TELEPHONE (OUTPATIENT)
Dept: HEMATOLOGY ONCOLOGY | Facility: CLINIC | Age: 84
End: 2019-09-30

## 2019-09-30 NOTE — TELEPHONE ENCOUNTER
Patient called in to verify appointment and to make sure there was no MRI appointment before his 10/16/2019 appointment

## 2019-10-03 ENCOUNTER — APPOINTMENT (OUTPATIENT)
Dept: LAB | Facility: HOSPITAL | Age: 84
End: 2019-10-03
Attending: INTERNAL MEDICINE
Payer: MEDICARE

## 2019-10-03 DIAGNOSIS — Z79.01 LONG TERM (CURRENT) USE OF ANTICOAGULANTS: ICD-10-CM

## 2019-10-03 LAB
INR PPP: 1.84 (ref 0.9–1.5)
PROTHROMBIN TIME: 21.5 SECONDS (ref 10.2–13)

## 2019-10-03 PROCEDURE — 36415 COLL VENOUS BLD VENIPUNCTURE: CPT

## 2019-10-03 PROCEDURE — 85610 PROTHROMBIN TIME: CPT

## 2019-10-15 ENCOUNTER — APPOINTMENT (OUTPATIENT)
Dept: LAB | Facility: HOSPITAL | Age: 84
End: 2019-10-15
Attending: SURGERY
Payer: MEDICARE

## 2019-10-15 ENCOUNTER — HOSPITAL ENCOUNTER (OUTPATIENT)
Dept: RADIOLOGY | Facility: HOSPITAL | Age: 84
Discharge: HOME | End: 2019-10-15
Attending: SURGERY
Payer: MEDICARE

## 2019-10-15 ENCOUNTER — TELEPHONE (OUTPATIENT)
Dept: SURGICAL ONCOLOGY | Facility: CLINIC | Age: 84
End: 2019-10-15

## 2019-10-15 DIAGNOSIS — Z85.820 ENCOUNTER FOR FOLLOW-UP SURVEILLANCE OF MELANOMA: ICD-10-CM

## 2019-10-15 DIAGNOSIS — Z08 ENCOUNTER FOR FOLLOW-UP SURVEILLANCE OF MELANOMA: ICD-10-CM

## 2019-10-15 LAB
ANION GAP SERPL CALCULATED.3IONS-SCNC: 7 MMOL/L (ref 4–13)
BUN SERPL-MCNC: 19 MG/DL (ref 5–25)
CALCIUM SERPL-MCNC: 9 MG/DL (ref 8.3–10.1)
CHLORIDE SERPL-SCNC: 101 MMOL/L (ref 100–108)
CO2 SERPL-SCNC: 30 MMOL/L (ref 21–32)
CREAT SERPL-MCNC: 1.03 MG/DL (ref 0.6–1.3)
GFR SERPL CREATININE-BSD FRML MDRD: 65 ML/MIN/1.73SQ M
GLUCOSE SERPL-MCNC: 105 MG/DL (ref 65–140)
POTASSIUM SERPL-SCNC: 3.7 MMOL/L (ref 3.5–5.3)
SODIUM SERPL-SCNC: 138 MMOL/L (ref 136–145)

## 2019-10-15 PROCEDURE — 83625 ASSAY OF LDH ENZYMES: CPT

## 2019-10-15 PROCEDURE — 36415 COLL VENOUS BLD VENIPUNCTURE: CPT

## 2019-10-15 PROCEDURE — 71046 X-RAY EXAM CHEST 2 VIEWS: CPT

## 2019-10-15 PROCEDURE — 83615 LACTATE (LD) (LDH) ENZYME: CPT

## 2019-10-15 PROCEDURE — 80048 BASIC METABOLIC PNL TOTAL CA: CPT

## 2019-10-15 NOTE — TELEPHONE ENCOUNTER
Left message for patient to have CXR and blood work before office f/u with Dr Kalyan Snyder, and to r/s appt until after his tests are completed

## 2019-10-16 ENCOUNTER — OFFICE VISIT (OUTPATIENT)
Dept: SURGICAL ONCOLOGY | Facility: CLINIC | Age: 84
End: 2019-10-16
Payer: MEDICARE

## 2019-10-16 VITALS
BODY MASS INDEX: 28.25 KG/M2 | RESPIRATION RATE: 16 BRPM | TEMPERATURE: 97.4 F | HEART RATE: 65 BPM | DIASTOLIC BLOOD PRESSURE: 68 MMHG | WEIGHT: 180 LBS | SYSTOLIC BLOOD PRESSURE: 120 MMHG | HEIGHT: 67 IN

## 2019-10-16 DIAGNOSIS — Z08 ENCOUNTER FOR FOLLOW-UP SURVEILLANCE OF MELANOMA: Primary | ICD-10-CM

## 2019-10-16 DIAGNOSIS — Z85.820 HISTORY OF MALIGNANT MELANOMA: ICD-10-CM

## 2019-10-16 DIAGNOSIS — Z85.820 ENCOUNTER FOR FOLLOW-UP SURVEILLANCE OF MELANOMA: Primary | ICD-10-CM

## 2019-10-16 PROCEDURE — 99213 OFFICE O/P EST LOW 20 MIN: CPT | Performed by: SURGERY

## 2019-10-16 RX ORDER — FERROUS SULFATE 325(65) MG
325 TABLET ORAL
COMMUNITY

## 2019-10-16 NOTE — LETTER
October 16, 2019     Brett Grimaldo[address]    Patient: Roxie Baker   YOB: 1931   Date of Visit: 10/16/2019       Dear Dr Vora Mood: Thank you for referring Roxie Baker to me for evaluation  Below are my notes for this consultation  If you have questions, please do not hesitate to call me  I look forward to following your patient along with you  Sincerely,        Royal Anette MD        CC: No Recipients  Royal Anette MD  10/16/2019  2:29 PM  Sign at close encounter     Surgical Oncology Follow Up       [facility] [address]   3/5/1931  [id number]  [address] [facility] [place] [id number]    Chief Complaint   Patient presents with    Follow-up     4 month follow up  Assessment/Plan:    No problem-specific Assessment & Plan notes found for this encounter  Diagnoses and all orders for this visit:    Encounter for follow-up surveillance of melanoma  -     US groin/inguinal area; Future    History of malignant melanoma    Other orders  -     ferrous sulfate 325 (65 Fe) mg tablet; Take 325 mg by mouth daily with breakfast        Advance Care Planning/Advance Directives:  Discussed disease status, cancer treatment plans and/or cancer treatment goals with the patient          History of malignant melanoma    10/9/2018 Biopsy     Left lateral dorsal foot shave biopsy  Melanoma  1 9 mm  Ulceration: present  Mitotic Rate: 13  lymphovascular invasion: not seen  Tumor regression: not seen  Growth phase: Vertical        12/3/2018 Surgery     Excision of left foot melanoma scar with SLN biopsy  Residual melanoma: 4 3 mm  Darío Level IV  Ulceration present  Margins: Peripheral negative 17 mm                 Deep margins negative 2 mm  Mitotic rate: 13  Lymphovascular invasion: not seen  Lymph nodes 0/2  Stage IIC         History of Present Illness:  Patient is 63-year-old man here for a follow-up visit   -Interval History:  Patient has had no issues since we last saw him  He had blood work and chest x-ray done in anticipation of today's visit  Review of Systems:  Review of Systems   Constitutional: Negative  HENT: Negative  Eyes: Negative  Respiratory: Negative  Cardiovascular: Negative  Gastrointestinal: Negative  Endocrine: Negative  Genitourinary: Negative  Musculoskeletal: Negative  Skin: Negative  Allergic/Immunologic: Negative  Neurological: Negative  Hematological: Negative  Psychiatric/Behavioral: Negative          Patient Active Problem List   Diagnosis    History of malignant melanoma    Blind right eye    Cerebrovascular accident ([address] )    Cholelithiasis without obstruction    Coronary arteriosclerosis in native artery    Essential hypertension    Hyperlipidemia    Idiopathic progressive polyneuropathy    Neovascular glaucoma of left eye    Osteoarthritis    Osteoporosis    Overweight    Vitamin D deficiency    Congestive heart failure (CHF) (HCC)    Chronic atrial fibrillation    Glaucoma of right eye    Wound of left groin    Wound of left foot    Encounter for follow-up surveillance of melanoma     Past Medical History:   Diagnosis Date    Anesthesia     "blood pressure went down and than had a blood transfusion after hip replacement"    Anticoagulated on Coumadin     Arthritis     Bleeding risk due to Coumadin and aspirin     Blind right eye     Bruises easily     Bulging lumbar disc     Cancer (HCC)     LT foot/melanoma    Coronary artery disease     Foot pain     "nerve pains to left foot sometimes"    Glaucoma     right eye    History of coronary artery stent placement     x2 "in the 1990's"    History of left hip replacement     History of transfusion     "i think with left hip replacement about 8 yrs ago, think it was my own blood"    Hyperlipidemia     Hypertension     Irregular heart beat     afib    Left shoulder pain     Low back pain     Osteopenia     Risk for falls     S/P CABG x 1 1974    Seasonal allergies     Shortness of breath     Teeth missing     lower    Use of cane as ambulatory aid     Wears dentures     upper     Past Surgical History:   Procedure Laterality Date   [facility] CARDIAC SURGERY      [address] and [address]892'X    CATARACT EXTRACTION Bilateral     COLONOSCOPY      CORONARY ARTERY BYPASS GRAFT      CORONARY STENT PLACEMENT  1990's    pt has two coronary stents    INCISION AND DRAINAGE OF WOUND Left 1/25/2019    Procedure: INCISION AND DRAINAGE (I&D) EXTREMITY;  Surgeon: Kimberly Castillo MD;  Location: [facility] MAIN OR;  Service: General    JOINT REPLACEMENT Left     THR    LYMPH NODE BIOPSY Left 12/3/2018    Procedure: SENTINEL NODE BX;  Surgeon: Delmy Buchanan MD;  Location: [facility] Main [facility];  Service: Surgical Oncology    RI ADJ TISS XFER SCALP,EXTREM <10 SQCM Left 12/3/2018    Procedure: LOCAL FLAP, SKIN GRAFT;  Surgeon: Erica Garnett MD;  Location: [facility];  Service: Plastics    RI KNEE SCOPE,MED/LAT MENISECTOMY Left 8/15/2018    Procedure: left KNEE ARTHROSCOPY with medial & lateral menisectomy, chondroplasty,synovectomy & injection;  Surgeon: Carmenza Rojas DO;  Location: [facility] MAIN OR;  Service: Orthopedics    SKIN BIOPSY      SKIN LESION EXCISION Left 12/3/2018    Procedure: LYMPHOSCINTIGRAPHY, INTRAOP LYMPHATIC MAPPING , SENT NODE BIOPSY, WIDE EXCISION MELANOMA SCAR LEFT LAT   DORSAL FOOT;  Surgeon: Delmy Buchanan MD;  Location: [facility];  Service: Surgical Oncology    VAC DRESSING APPLICATION Left 5/41/3083    Procedure: CHANGE DRESSING/VAC EXTREMITY;  Surgeon: Herman Vaz PA-C;  Location: [facility] MAIN OR;  Service: General    WISDOM TOOTH EXTRACTION      WOUND DEBRIDEMENT Left 1/25/2019    Procedure: EXCISIONAL DEBRIDEMENT;  Surgeon: Kyle Kumar MD;  Location: [address] [place] [address] MAIN OR;  Service: General     Family History   Problem Relation Age of Onset    Heart disease Mother      Social History     Socioeconomic History    Marital status:       Spouse name: Not on file    Number of children: Not on file    Years of education: Not on file    Highest education level: Not on file   Occupational History    Not on file   Social Needs    Financial resource strain: Not on file    Food insecurity:     Worry: Not on file     Inability: Not on file    Transportation needs:     Medical: Not on file     Non-medical: Not on file   Tobacco Use    Smoking status: Former Smoker     Packs/day: 1 50     Last attempt to quit:      Years since quittin 8    Smokeless tobacco: Never Used   Substance and Sexual Activity    Alcohol use: Yes     Comment: 1 wine weekly    Drug use: No    Sexual activity: Not on file   Lifestyle    Physical activity:     Days per week: Not on file     Minutes per session: Not on file    Stress: Not on file   Relationships    Social connections:     Talks on phone: Not on file     Gets together: Not on file     Attends Yarsani service: Not on file     Active member of club or organization: Not on file     Attends meetings of clubs or organizations: Not on file     Relationship status: Not on file    Intimate partner violence:     Fear of current or ex partner: Not on file     Emotionally abused: Not on file     Physically abused: Not on file     Forced sexual activity: Not on file   Other Topics Concern    Not on file   Social History Narrative    Not on file       Current Outpatient Medications:     amLODIPine (NORVASC) 10 mg tablet, 10 mg Daily, Disp: , Rfl:     aspirin 81 MG tablet, Take 81 mg by mouth daily, Disp: , Rfl:     atorvastatin (LIPITOR) 40 mg tablet, Take 40 mg by mouth daily, Disp: , Rfl:     atropine (ISOPTO ATROPINE) 1 % ophthalmic solution, Administer 1 drop to the right eye daily  , Disp: , Rfl:     Cholecalciferol (VITAMIN D-3) 1000 units CAPS, Daily, Disp: , Rfl:     ferrous sulfate 325 (65 Fe) mg tablet, Take 325 mg by mouth daily with breakfast, Disp: , Rfl:     furosemide (LASIX) 40 mg tablet, Daily, Disp: , Rfl:     losartan (COZAAR) 50 mg tablet, Daily, Disp: , Rfl:     prednisoLONE acetate (PRED FORTE) 1 % ophthalmic suspension, Administer 1 drop to the right eye daily  , Disp: , Rfl:     prednisoLONE acetate (PRED FORTE) 1 % ophthalmic suspension, Pred Forte 1 % eye drops,suspension  INSTILL 1 DROP INTO RIGHT EYE DAILY, Disp: , Rfl:     Pyridoxine HCl (VITAMIN B-6) 25 MG tablet, Daily, Disp: , Rfl:     timolol (TIMOPTIC) 0 5 % ophthalmic solution, Administer 1 drop into the left eye 2 (two) times a day, Disp: , Rfl:     warfarin (COUMADIN) 5 mg tablet, Take 0 5 tablets (2 5 mg total) by mouth daily, Disp: , Rfl: 0    zolpidem (AMBIEN) 5 mg tablet, Daily, Disp: , Rfl:   Allergies   Allergen Reactions    No Active Allergies      Vitals:    10/16/19 1355   BP: 120/68   Pulse: 65   Resp: 16   Temp: (!) 97 4 °F (36 3 °C)       Physical Exam   Constitutional: He is oriented to person, place, and time  He appears well-developed and well-nourished  HENT:   Head: Normocephalic and atraumatic  Right Ear: External ear normal    Left Ear: External ear normal    Eyes: Pupils are equal, round, and reactive to light  EOM are normal    Neck: Normal range of motion  Neck supple  Cardiovascular: Normal rate, regular rhythm and normal heart sounds  Pulmonary/Chest: Effort normal and breath sounds normal    Abdominal: Soft  Bowel sounds are normal    Musculoskeletal: Normal range of motion  Lymphadenopathy:     He has no cervical adenopathy  Neurological: He is alert and oriented to person, place, and time  Skin: Skin is warm and dry  Palpable left inguinal lymph nodes  Left foot graft site without evidence of recurrence visible or palpable     Psychiatric: He has a normal mood and affect  His behavior is normal  Thought content normal           Results:  Labs:  ldh pending    Imaging  Xr Chest Pa & Lateral    Result Date: 10/16/2019  Narrative: CHEST INDICATION:   Z08: Encounter for follow-up examination after completed treatment for malignant neoplasm Z85 820: Personal history of malignant melanoma of skin  COMPARISON:  X-ray 11/21/18 EXAM PERFORMED/VIEWS:  XR CHEST PA & LATERAL FINDINGS: Median sternotomy wires Cardiomediastinal silhouette appears unremarkable  The lungs are clear  No pneumothorax or pleural effusion  Osseous structures appear within normal limits for patient age  Impression: No acute cardiopulmonary disease  Workstation performed: [id number]     I reviewed the above laboratory and imaging data  Discussion/Summary:  Stage II melanoma, left lateral foot  No evidence of local recurrence  Palpable lymph nodes merit further workup  We will therefore order ultrasound to interrogate  Follow-up here once ultrasound available for review

## 2019-10-16 NOTE — PROGRESS NOTES
Surgical Oncology Follow Up       [facility]  CANCER [facility] SURGICAL ONCOLOGY [facility] [address]    Roxie Bakre   3/5/1931  [id number]  [address]  CANCER [facility] SURGICAL ONCOLOGY [facility]    Chief Complaint   Patient presents with    Follow-up     4 month follow up  Assessment/Plan:    No problem-specific Assessment & Plan notes found for this encounter  Diagnoses and all orders for this visit:    Encounter for follow-up surveillance of melanoma  -     US groin/inguinal area; Future    History of malignant melanoma    Other orders  -     ferrous sulfate 325 (65 Fe) mg tablet; Take 325 mg by mouth daily with breakfast        Advance Care Planning/Advance Directives:  Discussed disease status, cancer treatment plans and/or cancer treatment goals with the patient  History of malignant melanoma    10/9/2018 Biopsy     Left lateral dorsal foot shave biopsy  Melanoma  1 9 mm  Ulceration: present  Mitotic Rate: 13  lymphovascular invasion: not seen  Tumor regression: not seen  Growth phase: Vertical        12/3/2018 Surgery     Excision of left foot melanoma scar with SLN biopsy  Residual melanoma: 4 3 mm  Darío Level IV  Ulceration present  Margins: Peripheral negative 17 mm                 Deep margins negative 2 mm  Mitotic rate: 13  Lymphovascular invasion: not seen  Lymph nodes 0/2  Stage IIC         History of Present Illness:  Patient is 63-year-old man here for a follow-up visit   -Interval History:  Patient has had no issues since we last saw him  He had blood work and chest x-ray done in anticipation of today's visit  Review of Systems:  Review of Systems   Constitutional: Negative  HENT: Negative  Eyes: Negative  Respiratory: Negative  Cardiovascular: Negative  Gastrointestinal: Negative  Endocrine: Negative  Genitourinary: Negative  Musculoskeletal: Negative  Skin: Negative  Allergic/Immunologic: Negative  Neurological: Negative  Hematological: Negative  Psychiatric/Behavioral: Negative          Patient Active Problem List   Diagnosis    History of malignant melanoma    Blind right eye    Cerebrovascular accident ([address] )    Cholelithiasis without obstruction    Coronary arteriosclerosis in native artery    Essential hypertension    Hyperlipidemia    Idiopathic progressive polyneuropathy    Neovascular glaucoma of left eye    Osteoarthritis    Osteoporosis    Overweight    Vitamin D deficiency    Congestive heart failure (CHF) (HCC)    Chronic atrial fibrillation    Glaucoma of right eye    Wound of left groin    Wound of left foot    Encounter for follow-up surveillance of melanoma     Past Medical History:   Diagnosis Date    Anesthesia     "blood pressure went down and than had a blood transfusion after hip replacement"    Anticoagulated on Coumadin     Arthritis     Bleeding risk due to Coumadin and aspirin     Blind right eye     Bruises easily     Bulging lumbar disc     Cancer (HCC)     LT foot/melanoma    Coronary artery disease     Foot pain     "nerve pains to left foot sometimes"    Glaucoma     right eye    History of coronary artery stent placement     x2 "in the 1990's"    History of left hip replacement     History of transfusion     "i think with left hip replacement about 8 yrs ago, think it was my own blood"    Hyperlipidemia     Hypertension     Irregular heart beat     afib    Left shoulder pain     Low back pain     Osteopenia     Risk for falls     S/P CABG x 1 1974    Seasonal allergies     Shortness of breath     Teeth missing     lower    Use of cane as ambulatory aid     Wears dentures     upper     Past Surgical History:   Procedure Laterality Date   [facility] CARDIAC SURGERY      1974 and [address]    CATARACT EXTRACTION Bilateral     COLONOSCOPY      CORONARY ARTERY BYPASS GRAFT      CORONARY STENT PLACEMENT  1990's    pt has two coronary stents    INCISION AND DRAINAGE OF WOUND Left 1/25/2019    Procedure: INCISION AND DRAINAGE (I&D) EXTREMITY;  Surgeon: Alpha Gaucher, MD;  Location: [address] MAIN OR;  Service: General    JOINT REPLACEMENT Left     THR    LYMPH NODE BIOPSY Left 12/3/2018    Procedure: SENTINEL NODE BX;  Surgeon: Betty Luevano MD;  [facility];  Service: Surgical Oncology    VT ADJ TISS XFER SCALP,EXTREM <10 SQCM Left 12/3/2018    Procedure: LOCAL FLAP, SKIN GRAFT;  Surgeon: Shoaib Chirinos MD;  [facility];  Service: Plastics    VT KNEE SCOPE,MED/LAT MENISECTOMY Left 8/15/2018    Procedure: left KNEE ARTHROSCOPY with medial & lateral menisectomy, chondroplasty,synovectomy & injection;  Surgeon: Jose Mejia DO;  [facility]: [facility] MAIN OR;  Service: Orthopedics    SKIN BIOPSY      SKIN LESION EXCISION Left 12/3/2018    Procedure: LYMPHOSCINTIGRAPHY, INTRAOP LYMPHATIC MAPPING , SENT NODE BIOPSY, WIDE EXCISION MELANOMA SCAR LEFT LAT  DORSAL FOOT;  Surgeon: Betty Luevano MD;  [facility];  Service: Surgical Oncology    VAC DRESSING APPLICATION Left 3/67/2753    Procedure: CHANGE DRESSING/VAC EXTREMITY;  Surgeon: Emerson Vaz PA-C;  Location: [address] [facility] MAIN OR;  Service: General    WISDOM TOOTH EXTRACTION      WOUND DEBRIDEMENT Left 1/25/2019    Procedure: EXCISIONAL DEBRIDEMENT;  Surgeon: Alpha Gaucher, MD;  Location: [facility] MAIN OR;  Service: General     Family History   Problem Relation Age of Onset    Heart disease Mother      Social History     Socioeconomic History    Marital status:       Spouse name: Not on file    Number of children: Not on file    Years of education: Not on file    Highest education level: Not on file   Occupational History    Not on file   Social Needs    Financial resource strain: Not on file    Food insecurity:     Worry: Not on file     Inability: Not on file    Transportation needs:     Medical: Not on file Non-medical: Not on file   Tobacco Use    Smoking status: Former Smoker     Packs/day: 1 50     Last attempt to quit: 1974     Years since quittin 8    Smokeless tobacco: Never Used   Substance and Sexual Activity    Alcohol use: Yes     Comment: 1 wine weekly    Drug use: No    Sexual activity: Not on file   Lifestyle    Physical activity:     Days per week: Not on file     Minutes per session: Not on file    Stress: Not on file   Relationships    Social connections:     Talks on phone: Not on file     Gets together: Not on file     Attends Judaism service: Not on file     Active member of club or organization: Not on file     Attends meetings of clubs or organizations: Not on file     Relationship status: Not on file    Intimate partner violence:     Fear of current or ex partner: Not on file     Emotionally abused: Not on file     Physically abused: Not on file     Forced sexual activity: Not on file   Other Topics Concern    Not on file   Social History Narrative    Not on file       Current Outpatient Medications:     amLODIPine (NORVASC) 10 mg tablet, 10 mg Daily, Disp: , Rfl:     aspirin 81 MG tablet, Take 81 mg by mouth daily, Disp: , Rfl:     atorvastatin (LIPITOR) 40 mg tablet, Take 40 mg by mouth daily, Disp: , Rfl:     atropine (ISOPTO ATROPINE) 1 % ophthalmic solution, Administer 1 drop to the right eye daily  , Disp: , Rfl:     Cholecalciferol (VITAMIN D-3) 1000 units CAPS, Daily, Disp: , Rfl:     ferrous sulfate 325 (65 Fe) mg tablet, Take 325 mg by mouth daily with breakfast, Disp: , Rfl:     furosemide (LASIX) 40 mg tablet, Daily, Disp: , Rfl:     losartan (COZAAR) 50 mg tablet, Daily, Disp: , Rfl:     prednisoLONE acetate (PRED FORTE) 1 % ophthalmic suspension, Administer 1 drop to the right eye daily  , Disp: , Rfl:     prednisoLONE acetate (PRED FORTE) 1 % ophthalmic suspension, Pred Forte 1 % eye drops,suspension  INSTILL 1 DROP INTO RIGHT EYE DAILY, Disp: , Rfl:    Pyridoxine HCl (VITAMIN B-6) 25 MG tablet, Daily, Disp: , Rfl:     timolol (TIMOPTIC) 0 5 % ophthalmic solution, Administer 1 drop into the left eye 2 (two) times a day, Disp: , Rfl:     warfarin (COUMADIN) 5 mg tablet, Take 0 5 tablets (2 5 mg total) by mouth daily, Disp: , Rfl: 0    zolpidem (AMBIEN) 5 mg tablet, Daily, Disp: , Rfl:   Allergies   Allergen Reactions    No Active Allergies      Vitals:    10/16/19 1355   BP: 120/68   Pulse: 65   Resp: 16   Temp: (!) 97 4 °F (36 3 °C)       Physical Exam   Constitutional: He is oriented to person, place, and time  He appears well-developed and well-nourished  HENT:   Head: Normocephalic and atraumatic  Right Ear: External ear normal    Left Ear: External ear normal    Eyes: Pupils are equal, round, and reactive to light  EOM are normal    Neck: Normal range of motion  Neck supple  Cardiovascular: Normal rate, regular rhythm and normal heart sounds  Pulmonary/Chest: Effort normal and breath sounds normal    Abdominal: Soft  Bowel sounds are normal    Musculoskeletal: Normal range of motion  Lymphadenopathy:     He has no cervical adenopathy  Neurological: He is alert and oriented to person, place, and time  Skin: Skin is warm and dry  Palpable left inguinal lymph nodes  Left foot graft site without evidence of recurrence visible or palpable  Psychiatric: He has a normal mood and affect  His behavior is normal  Thought content normal           Results:  Labs:  ldh pending    Imaging  Xr Chest Pa & Lateral    Result Date: 10/16/2019  Narrative: CHEST INDICATION:   Z08: Encounter for follow-up examination after completed treatment for malignant neoplasm Z85 820: Personal history of malignant melanoma of skin  COMPARISON:  X-ray 11/21/18 EXAM PERFORMED/VIEWS:  XR CHEST PA & LATERAL FINDINGS: Median sternotomy wires Cardiomediastinal silhouette appears unremarkable  The lungs are clear  No pneumothorax or pleural effusion   Osseous structures appear within normal limits for patient age  Impression: No acute cardiopulmonary disease  Workstation performed: [id number]     I reviewed the above laboratory and imaging data  Discussion/Summary:  Stage II melanoma, left lateral foot  No evidence of local recurrence  Palpable lymph nodes merit further workup  We will therefore order ultrasound to interrogate  Follow-up here once ultrasound available for review

## 2019-10-17 LAB
LDH SERPL-CCNC: 232 IU/L (ref 121–224)
LDH1 CFR SERPL ELPH: 24 % (ref 17–32)
LDH2 CFR SERPL ELPH: 32 % (ref 25–40)
LDH3 CFR SERPL ELPH: 23 % (ref 17–27)
LDH4 CFR SERPL ELPH: 10 % (ref 5–13)
LDH5 CFR SERPL ELPH: 11 % (ref 4–20)

## 2019-10-21 ENCOUNTER — HOSPITAL ENCOUNTER (OUTPATIENT)
Dept: RADIOLOGY | Facility: HOSPITAL | Age: 84
Discharge: HOME | End: 2019-10-21
Attending: SURGERY
Payer: MEDICARE

## 2019-10-21 DIAGNOSIS — Z08 ENCOUNTER FOR FOLLOW-UP SURVEILLANCE OF MELANOMA: ICD-10-CM

## 2019-10-21 DIAGNOSIS — Z85.820 ENCOUNTER FOR FOLLOW-UP SURVEILLANCE OF MELANOMA: ICD-10-CM

## 2019-10-21 PROCEDURE — 76705 ECHO EXAM OF ABDOMEN: CPT

## 2019-10-29 PROBLEM — R59.1 LYMPHADENOPATHY: Status: ACTIVE | Noted: 2019-10-29

## 2019-10-30 ENCOUNTER — OFFICE VISIT (OUTPATIENT)
Dept: SURGICAL ONCOLOGY | Facility: CLINIC | Age: 84
End: 2019-10-30
Payer: MEDICARE

## 2019-10-30 VITALS
DIASTOLIC BLOOD PRESSURE: 60 MMHG | WEIGHT: 174 LBS | HEART RATE: 66 BPM | SYSTOLIC BLOOD PRESSURE: 110 MMHG | BODY MASS INDEX: 27.31 KG/M2 | RESPIRATION RATE: 16 BRPM | HEIGHT: 67 IN | TEMPERATURE: 99.2 F

## 2019-10-30 DIAGNOSIS — R59.1 LYMPHADENOPATHY: Primary | ICD-10-CM

## 2019-10-30 PROCEDURE — 99213 OFFICE O/P EST LOW 20 MIN: CPT | Performed by: SURGERY

## 2019-10-30 NOTE — PROGRESS NOTES
Surgical Oncology Follow Up       [facility]  CANCER CARE [facility] SURGICAL ONCOLOGY [facility] [address]    Meriam Port  3/5/1931  [id number]  [address]  CANCER [facility] SURGICAL ONCOLOGY [facility]    Chief Complaint   Patient presents with    Follow-up     2 week follow up  Assessment/Plan:    No problem-specific Assessment & Plan notes found for this encounter  Diagnoses and all orders for this visit:    Lymphadenopathy  -     US groin/inguinal area; Future  -     CT chest abdomen pelvis w contrast; Future  -     Basic metabolic panel; Future  -     LD,Blood; Future        Advance Care Planning/Advance Directives:  Discussed disease status, cancer treatment plans and/or cancer treatment goals with the patient  History of malignant melanoma    10/9/2018 Biopsy     Left lateral dorsal foot shave biopsy  Melanoma  1 9 mm  Ulceration: present  Mitotic Rate: 13  lymphovascular invasion: not seen  Tumor regression: not seen  Growth phase: Vertical        12/3/2018 Surgery     Excision of left foot melanoma scar with SLN biopsy  Residual melanoma: 4 3 mm  Darío Level IV  Ulceration present  Margins: Peripheral negative 17 mm                 Deep margins negative 2 mm  Mitotic rate: 13  Lymphovascular invasion: not seen  Lymph nodes 0/2  Stage IIC         History of Present Illness: 59-year-old man, history of stage II melanoma   -Interval History:  Patient is here status post left groin ultrasound  No complaints    Review of Systems:  Review of Systems   Constitutional: Negative  HENT: Negative  Eyes: Negative  Respiratory: Negative  Cardiovascular: Negative  Gastrointestinal: Negative  Endocrine: Negative  Genitourinary: Negative  Musculoskeletal: Negative  Skin: Negative  Allergic/Immunologic: Negative  Neurological: Negative          Patient Active Problem List   Diagnosis  History of malignant melanoma    Blind right eye    Cerebrovascular accident ([address] )    Cholelithiasis without obstruction    Coronary arteriosclerosis in native artery    Essential hypertension    Hyperlipidemia    Idiopathic progressive polyneuropathy    Neovascular glaucoma of left eye    Osteoarthritis    Osteoporosis    Overweight    Vitamin D deficiency    Congestive heart failure (CHF) (HCC)    Chronic atrial fibrillation    Glaucoma of right eye    Wound of left groin    Wound of left foot    Encounter for follow-up surveillance of melanoma    Lymphadenopathy     Past Medical History:   Diagnosis Date    Anesthesia     "blood pressure went down and than had a blood transfusion after hip replacement"    Anticoagulated on Coumadin     Arthritis     Bleeding risk due to Coumadin and aspirin     Blind right eye     Bruises easily     Bulging lumbar disc     Cancer (HCC)     LT foot/melanoma    Coronary artery disease     Foot pain     "nerve pains to left foot sometimes"    Glaucoma     right eye    History of coronary artery stent placement     x2 "in the 1990's"    History of left hip replacement     History of transfusion     "i think with left hip replacement about 8 yrs ago, think it was my own blood"    Hyperlipidemia     Hypertension     Irregular heart beat     afib    Left shoulder pain     Low back pain     Osteopenia     Risk for falls     S/P CABG x 1 1974    Seasonal allergies     Shortness of breath     Teeth missing     lower    Use of cane as ambulatory aid     Wears dentures     upper     Past Surgical History:   Procedure Laterality Date   [employer] CARDIAC SURGERY      1974 and [address]    CATARACT EXTRACTION Bilateral     COLONOSCOPY      CORONARY ARTERY BYPASS GRAFT      CORONARY STENT PLACEMENT  1990's    pt has two coronary stents    INCISION AND DRAINAGE OF WOUND Left 1/25/2019    Procedure: INCISION AND DRAINAGE (I&D) EXTREMITY;  Surgeon: Arabella Pagan MD;  Location: [address] MAIN OR;  Service: General    JOINT REPLACEMENT Left     THR    LYMPH NODE BIOPSY Left 12/3/2018    Procedure: SENTINEL NODE BX;  Surgeon: Ines Velázquez MD;  [facility];  Service: Surgical Oncology    MA ADJ TISS XFER SCALP,EXTREM <10 SQCM Left 12/3/2018    Procedure: LOCAL FLAP, SKIN GRAFT;  Surgeon: Hari Sy MD;  [facility];  Service: Plastics    MA KNEE SCOPE,MED/LAT MENISECTOMY Left 8/15/2018    Procedure: left KNEE ARTHROSCOPY with medial & lateral menisectomy, chondroplasty,synovectomy & injection;  Surgeon: Galo Hurtado DO;  Location: [address] [facility] MAIN OR;  Service: Orthopedics    SKIN BIOPSY      SKIN LESION EXCISION Left 12/3/2018    Procedure: LYMPHOSCINTIGRAPHY, INTRAOP LYMPHATIC MAPPING , SENT NODE BIOPSY, WIDE EXCISION MELANOMA SCAR LEFT LAT  DORSAL FOOT;  Surgeon: Ines Velázquez MD;  [facility];  Service: Surgical Oncology    VAC DRESSING APPLICATION Left     Procedure: CHANGE DRESSING/VAC EXTREMITY;  Surgeon: Teo Vaz PA-C;  [facility] MAIN OR;  Service: General    WISDOM TOOTH EXTRACTION      WOUND DEBRIDEMENT Left 2019    Procedure: EXCISIONAL DEBRIDEMENT;  Surgeon: Arabella Pagan MD;  [facility] MAIN OR;  Service: General     Family History   Problem Relation Age of Onset    Heart disease Mother      Social History     Socioeconomic History    Marital status:       Spouse name: Not on file    Number of children: Not on file    Years of education: Not on file    Highest education level: Not on file   Occupational History    Not on file   Social Needs    Financial resource strain: Not on file    Food insecurity:     Worry: Not on file     Inability: Not on file    Transportation needs:     Medical: Not on file     Non-medical: Not on file   Tobacco Use    Smoking status: Former Smoker     Packs/day: 1 50     Last attempt to quit: 1974     Years since quittin 8    Smokeless tobacco: Never Used   Substance and Sexual Activity    Alcohol use: Yes     Comment: 1 wine weekly    Drug use: No    Sexual activity: Not on file   Lifestyle    Physical activity:     Days per week: Not on file     Minutes per session: Not on file    Stress: Not on file   Relationships    Social connections:     Talks on phone: Not on file     Gets together: Not on file     Attends Zoroastrian service: Not on file     Active member of club or organization: Not on file     Attends meetings of clubs or organizations: Not on file     Relationship status: Not on file    Intimate partner violence:     Fear of current or ex partner: Not on file     Emotionally abused: Not on file     Physically abused: Not on file     Forced sexual activity: Not on file   Other Topics Concern    Not on file   Social History Narrative    Not on file       Current Outpatient Medications:     amLODIPine (NORVASC) 10 mg tablet, 10 mg Daily, Disp: , Rfl:     aspirin 81 MG tablet, Take 81 mg by mouth daily, Disp: , Rfl:     atorvastatin (LIPITOR) 40 mg tablet, Take 40 mg by mouth daily, Disp: , Rfl:     atropine (ISOPTO ATROPINE) 1 % ophthalmic solution, Administer 1 drop to the right eye daily  , Disp: , Rfl:     Cholecalciferol (VITAMIN D-3) 1000 units CAPS, Daily, Disp: , Rfl:     ferrous sulfate 325 (65 Fe) mg tablet, Take 325 mg by mouth daily with breakfast, Disp: , Rfl:     furosemide (LASIX) 40 mg tablet, Daily, Disp: , Rfl:     losartan (COZAAR) 50 mg tablet, Daily, Disp: , Rfl:     prednisoLONE acetate (PRED FORTE) 1 % ophthalmic suspension, Administer 1 drop to the right eye daily  , Disp: , Rfl:     prednisoLONE acetate (PRED FORTE) 1 % ophthalmic suspension, Pred Forte 1 % eye drops,suspension  INSTILL 1 DROP INTO RIGHT EYE DAILY, Disp: , Rfl:     Pyridoxine HCl (VITAMIN B-6) 25 MG tablet, Daily, Disp: , Rfl:     timolol (TIMOPTIC) 0 5 % ophthalmic solution, Administer 1 drop into the left eye 2 (two) times a day, Disp: , Rfl:     warfarin (COUMADIN) 5 mg tablet, Take 0 5 tablets (2 5 mg total) by mouth daily, Disp: , Rfl: 0    zolpidem (AMBIEN) 5 mg tablet, Daily, Disp: , Rfl:   Allergies   Allergen Reactions    No Active Allergies      Vitals:    10/30/19 1300   BP: 110/60   Pulse: 66   Resp: 16   Temp: 99 2 °F (37 3 °C)       Physical Exam   Constitutional: He is oriented to person, place, and time  He appears well-developed and well-nourished  HENT:   Head: Normocephalic and atraumatic  Right Ear: External ear normal    Left Ear: External ear normal    Eyes: Pupils are equal, round, and reactive to light  EOM are normal    Neck: Normal range of motion  Neck supple  Cardiovascular: Normal rate  Musculoskeletal: Normal range of motion  Neurological: He is alert and oriented to person, place, and time  Skin: Skin is warm and dry  Results:  Labs:  none    Imaging    GROIN/INGUINAL AREA ULTRASOUND     INDICATION:   Z08: Encounter for follow-up examination after completed treatment for malignant neoplasm  Z85 820: Personal history of malignant melanoma of skin      COMPARISON:  CT chest abdomen pelvis 4/26/2019     TECHNIQUE:   Real-time ultrasound of the left groin was performed with a linear transducer with both volumetric sweeps and still imaging techniques      FINDINGS:  Several lymph nodes identified in the left groin, corresponding to patient's palpable abnormality        A lymph node here measures 1 5 x 1 2 x 0 6 cm        A 2nd lymph elongated lymph node measures 2 5 x 1 2 x 0 8 cm      A 3rd lymph node measures 0 7 x 0 5 x 0 6 cm      A 4th lymph node measures 1 9 x 1 4 x 1 1 cm      These all have a normal morphologic appearance with prominent fatty hilum  These are likely not significantly changed in size from prior CT examination given differences in technique of imaging  IMPRESSION:     1  Several borderline prominent lymph nodes identified in the left groin    These do have a normal morphologic appearance  Findings are nonspecific  Consider follow up as warranted         Workstation performed: [id number]  Xr Chest Pa & Lateral    Result Date: 10/16/2019  Narrative: CHEST INDICATION:   Z08: Encounter for follow-up examination after completed treatment for malignant neoplasm Z85 820: Personal history of malignant melanoma of skin  COMPARISON:  X-ray 11/21/18 EXAM PERFORMED/VIEWS:  XR CHEST PA & LATERAL FINDINGS: Median sternotomy wires Cardiomediastinal silhouette appears unremarkable  The lungs are clear  No pneumothorax or pleural effusion  Osseous structures appear within normal limits for patient age  Impression: No acute cardiopulmonary disease  Workstation performed: [id number]     Us Groin/inguinal Area    Result Date: 10/24/2019  Narrative: GROIN/INGUINAL AREA ULTRASOUND INDICATION:   Z08: Encounter for follow-up examination after completed treatment for malignant neoplasm Z85 820: Personal history of malignant melanoma of skin  COMPARISON:  CT chest abdomen pelvis 4/26/2019 TECHNIQUE:   Real-time ultrasound of the left groin was performed with a linear transducer with both volumetric sweeps and still imaging techniques  FINDINGS:  Several lymph nodes identified in the left groin, corresponding to patient's palpable abnormality  A lymph node here measures 1 5 x 1 2 x 0 6 cm  A 2nd lymph elongated lymph node measures 2 5 x 1 2 x 0 8 cm  A 3rd lymph node measures 0 7 x 0 5 x 0 6 cm  A 4th lymph node measures 1 9 x 1 4 x 1 1 cm  These all have a normal morphologic appearance with prominent fatty hilum  These are likely not significantly changed in size from prior CT examination given differences in technique of imaging  Impression: 1  Several borderline prominent lymph nodes identified in the left groin  These do have a normal morphologic appearance  Findings are nonspecific  Consider follow up as warranted   Workstation performed: [id number]     I reviewed the above laboratory and imaging data  Discussion/Summary:  20-year-old man, history of stage II melanoma  Lymph nodes appear morphologically normal   We will therefore plan on follow-up in 6 months with surveillance scan, repeat ultrasound, and blood work at that time

## 2019-10-30 NOTE — LETTER
October 30, 2019     [address]    Patient: Debbie Mallory   YOB: 1931   Date of Visit: 10/30/2019       Dear Dr Rony Angela: Thank you for referring Debbie Mallory to me for evaluation  Below are my notes for this consultation  If you have questions, please do not hesitate to call me  I look forward to following your patient along with you  Sincerely,        Vilma Gunter MD        CC: No Recipients  Vilma Gunter MD  10/30/2019  1:21 PM  Sign at close encounter     Surgical Oncology Follow Up       [address]  3/5/1931  [id number]  [facility]    Chief Complaint   Patient presents with    Follow-up     2 week follow up  Assessment/Plan:    No problem-specific Assessment & Plan notes found for this encounter  Diagnoses and all orders for this visit:    Lymphadenopathy  -     US groin/inguinal area; Future  -     CT chest abdomen pelvis w contrast; Future  -     Basic metabolic panel; Future  -     LD,Blood; Future        Advance Care Planning/Advance Directives:  Discussed disease status, cancer treatment plans and/or cancer treatment goals with the patient          History of malignant melanoma    10/9/2018 Biopsy     Left lateral dorsal foot shave biopsy  Melanoma  1 9 mm  Ulceration: present  Mitotic Rate: 13  lymphovascular invasion: not seen  Tumor regression: not seen  Growth phase: Vertical        12/3/2018 Surgery     Excision of left foot melanoma scar with SLN biopsy  Residual melanoma: 4 3 mm  Darío Level IV  Ulceration present  Margins: Peripheral negative 17 mm                 Deep margins negative 2 mm  Mitotic rate: 13  Lymphovascular invasion: not seen  Lymph nodes 0/2  Stage IIC History of Present Illness: 80-year-old man, history of stage II melanoma   -Interval History:  Patient is here status post left groin ultrasound  No complaints    Review of Systems:  Review of Systems   Constitutional: Negative  HENT: Negative  Eyes: Negative  Respiratory: Negative  Cardiovascular: Negative  Gastrointestinal: Negative  Endocrine: Negative  Genitourinary: Negative  Musculoskeletal: Negative  Skin: Negative  Allergic/Immunologic: Negative  Neurological: Negative          Patient Active Problem List   Diagnosis    History of malignant melanoma    Blind right eye    Cerebrovascular accident ([address] )    Cholelithiasis without obstruction    Coronary arteriosclerosis in native artery    Essential hypertension    Hyperlipidemia    Idiopathic progressive polyneuropathy    Neovascular glaucoma of left eye    Osteoarthritis    Osteoporosis    Overweight    Vitamin D deficiency    Congestive heart failure (CHF) (HCC)    Chronic atrial fibrillation    Glaucoma of right eye    Wound of left groin    Wound of left foot    Encounter for follow-up surveillance of melanoma    Lymphadenopathy     Past Medical History:   Diagnosis Date    Anesthesia     "blood pressure went down and than had a blood transfusion after hip replacement"    Anticoagulated on Coumadin     Arthritis     Bleeding risk due to Coumadin and aspirin     Blind right eye     Bruises easily     Bulging lumbar disc     Cancer (HCC)     LT foot/melanoma    Coronary artery disease     Foot pain     "nerve pains to left foot sometimes"    Glaucoma     right eye    History of coronary artery stent placement     x2 "in the 1990's"    History of left hip replacement     History of transfusion     "i think with left hip replacement about 8 yrs ago, think it was my own blood"    Hyperlipidemia     Hypertension     Irregular heart beat     afib    Left shoulder pain     Low back pain  Osteopenia     Risk for falls     S/P CABG x 1 1974    Seasonal allergies     Shortness of breath     Teeth missing     lower    Use of cane as ambulatory aid     Wears dentures     upper     Past Surgical History:   Procedure Laterality Date   [facility] CARDIAC SURGERY      [address] [address]'[address]    CATARACT EXTRACTION Bilateral     COLONOSCOPY      CORONARY ARTERY BYPASS GRAFT      CORONARY STENT PLACEMENT  1990's    pt has two coronary stents    INCISION AND DRAINAGE OF WOUND Left 1/25/2019    Procedure: INCISION AND DRAINAGE (I&D) EXTREMITY;  Surgeon: Natasha Vega MD;  Location: [address] [place] [address] MAIN OR;  Service: General    JOINT REPLACEMENT Left     THR    LYMPH NODE BIOPSY Left 12/3/2018    Procedure: SENTINEL NODE BX;  Surgeon: José Miguel Rosado MD;  Location: [facility];  Service: Surgical Oncology    MD ADJ TISS XFER SCALP,EXTREM <10 SQCM Left 12/3/2018    Procedure: LOCAL FLAP, SKIN GRAFT;  Surgeon: Qi Ames MD;  Location: [facility];  Service: Plastics    MD KNEE SCOPE,MED/LAT MENISECTOMY Left 8/15/2018    Procedure: left KNEE ARTHROSCOPY with medial & lateral menisectomy, chondroplasty,synovectomy & injection;  Surgeon: Nano Cowan DO;  Location: [address] [place] [address] MAIN OR;  Service: Orthopedics    SKIN BIOPSY      SKIN LESION EXCISION Left 12/3/2018    Procedure: LYMPHOSCINTIGRAPHY, INTRAOP LYMPHATIC MAPPING , SENT NODE BIOPSY, WIDE EXCISION MELANOMA SCAR LEFT LAT   DORSAL FOOT;  Surgeon: José Miguel Rosado MD;  [facility];  Service: Surgical Oncology    VAC DRESSING APPLICATION Left 4/25/2300    Procedure: CHANGE DRESSING/VAC EXTREMITY;  Surgeon: Grady Vaz PA-C;  Location: [address] [place] [address] MAIN OR;  Service: General    WISDOM TOOTH EXTRACTION      WOUND DEBRIDEMENT Left 1/25/2019    Procedure: EXCISIONAL DEBRIDEMENT;  Surgeon: Natasha Vega MD;  Location: [address] [place] [address] MAIN OR;  Service: General     Family History   Problem Relation Age of Onset    Heart disease Mother      Social History     Socioeconomic History    Marital status:       Spouse name: Not on file    Number of children: Not on file    Years of education: Not on file    Highest education level: Not on file   Occupational History    Not on file   Social Needs    Financial resource strain: Not on file    Food insecurity:     Worry: Not on file     Inability: Not on file    Transportation needs:     Medical: Not on file     Non-medical: Not on file   Tobacco Use    Smoking status: Former Smoker     Packs/day: 1 50     Last attempt to quit: 1974     Years since quittin 8    Smokeless tobacco: Never Used   Substance and Sexual Activity    Alcohol use: Yes     Comment: 1 wine weekly    Drug use: No    Sexual activity: Not on file   Lifestyle    Physical activity:     Days per week: Not on file     Minutes per session: Not on file    Stress: Not on file   Relationships    Social connections:     Talks on phone: Not on file     Gets together: Not on file     Attends Hinduism service: Not on file     Active member of club or organization: Not on file     Attends meetings of clubs or organizations: Not on file     Relationship status: Not on file    Intimate partner violence:     Fear of current or ex partner: Not on file     Emotionally abused: Not on file     Physically abused: Not on file     Forced sexual activity: Not on file   Other Topics Concern    Not on file   Social History Narrative    Not on file       Current Outpatient Medications:     amLODIPine (NORVASC) 10 mg tablet, 10 mg Daily, Disp: , Rfl:     aspirin 81 MG tablet, Take 81 mg by mouth daily, Disp: , Rfl:     atorvastatin (LIPITOR) 40 mg tablet, Take 40 mg by mouth daily, Disp: , Rfl:     atropine (ISOPTO ATROPINE) 1 % ophthalmic solution, Administer 1 drop to the right eye daily  , Disp: , Rfl:     Cholecalciferol (VITAMIN D-3) 1000 units CAPS, Daily, Disp: , Rfl:     ferrous sulfate 325 (65 Fe) mg tablet, Take 325 mg by mouth daily with breakfast, Disp: , Rfl:     furosemide (LASIX) 40 mg tablet, Daily, Disp: , Rfl:     losartan (COZAAR) 50 mg tablet, Daily, Disp: , Rfl:     prednisoLONE acetate (PRED FORTE) 1 % ophthalmic suspension, Administer 1 drop to the right eye daily  , Disp: , Rfl:     prednisoLONE acetate (PRED FORTE) 1 % ophthalmic suspension, Pred Forte 1 % eye drops,suspension  INSTILL 1 DROP INTO RIGHT EYE DAILY, Disp: , Rfl:     Pyridoxine HCl (VITAMIN B-6) 25 MG tablet, Daily, Disp: , Rfl:     timolol (TIMOPTIC) 0 5 % ophthalmic solution, Administer 1 drop into the left eye 2 (two) times a day, Disp: , Rfl:     warfarin (COUMADIN) 5 mg tablet, Take 0 5 tablets (2 5 mg total) by mouth daily, Disp: , Rfl: 0    zolpidem (AMBIEN) 5 mg tablet, Daily, Disp: , Rfl:   Allergies   Allergen Reactions    No Active Allergies      Vitals:    10/30/19 1300   BP: 110/60   Pulse: 66   Resp: 16   Temp: 99 2 °F (37 3 °C)       Physical Exam   Constitutional: He is oriented to person, place, and time  He appears well-developed and well-nourished  HENT:   Head: Normocephalic and atraumatic  Right Ear: External ear normal    Left Ear: External ear normal    Eyes: Pupils are equal, round, and reactive to light  EOM are normal    Neck: Normal range of motion  Neck supple  Cardiovascular: Normal rate  Musculoskeletal: Normal range of motion  Neurological: He is alert and oriented to person, place, and time  Skin: Skin is warm and dry           Results:  Labs:  none    Imaging    GROIN/INGUINAL AREA ULTRASOUND     INDICATION:   Z08: Encounter for follow-up examination after completed treatment for malignant neoplasm  Z85 820: Personal history of malignant melanoma of skin      COMPARISON:  CT chest abdomen pelvis 4/26/2019     TECHNIQUE:   Real-time ultrasound of the left groin was performed with a linear transducer with both volumetric sweeps and still imaging techniques      FINDINGS:  Several lymph nodes identified in the left groin, corresponding to patient's palpable abnormality        A lymph node here measures 1 5 x 1 2 x 0 6 cm        A 2nd lymph elongated lymph node measures 2 5 x 1 2 x 0 8 cm      A 3rd lymph node measures 0 7 x 0 5 x 0 6 cm      A 4th lymph node measures 1 9 x 1 4 x 1 1 cm      These all have a normal morphologic appearance with prominent fatty hilum  These are likely not significantly changed in size from prior CT examination given differences in technique of imaging  IMPRESSION:     1  Several borderline prominent lymph nodes identified in the left groin  These do have a normal morphologic appearance  Findings are nonspecific  Consider follow up as warranted         Workstation performed: [id number]  Xr Chest Pa & Lateral    Result Date: 10/16/2019  Narrative: CHEST INDICATION:   Z08: Encounter for follow-up examination after completed treatment for malignant neoplasm Z85 820: Personal history of malignant melanoma of skin  COMPARISON:  X-ray 11/21/18 EXAM PERFORMED/VIEWS:  XR CHEST PA & LATERAL FINDINGS: Median sternotomy wires Cardiomediastinal silhouette appears unremarkable  The lungs are clear  No pneumothorax or pleural effusion  Osseous structures appear within normal limits for patient age  Impression: No acute cardiopulmonary disease  Workstation performed: [id number]     Us Groin/inguinal Area    Result Date: 10/24/2019  Narrative: GROIN/INGUINAL AREA ULTRASOUND INDICATION:   Z08: Encounter for follow-up examination after completed treatment for malignant neoplasm Z85 820: Personal history of malignant melanoma of skin  COMPARISON:  CT chest abdomen pelvis 4/26/2019 TECHNIQUE:   Real-time ultrasound of the left groin was performed with a linear transducer with both volumetric sweeps and still imaging techniques  FINDINGS:  Several lymph nodes identified in the left groin, corresponding to patient's palpable abnormality  A lymph node here measures 1 5 x 1 2 x 0 6 cm    A 2nd lymph elongated lymph node measures 2 5 x 1 2 x 0 8 cm  A 3rd lymph node measures 0 7 x 0 5 x 0 6 cm  A 4th lymph node measures 1 9 x 1 4 x 1 1 cm  These all have a normal morphologic appearance with prominent fatty hilum  These are likely not significantly changed in size from prior CT examination given differences in technique of imaging  Impression: 1  Several borderline prominent lymph nodes identified in the left groin  These do have a normal morphologic appearance  Findings are nonspecific  Consider follow up as warranted  Workstation performed: [id number]     I reviewed the above laboratory and imaging data  Discussion/Summary:  70-year-old man, history of stage II melanoma  Lymph nodes appear morphologically normal   We will therefore plan on follow-up in 6 months with surveillance scan, repeat ultrasound, and blood work at that time

## 2019-10-31 ENCOUNTER — TRANSCRIBE ORDERS (OUTPATIENT)
Dept: LAB | Facility: HOSPITAL | Age: 84
End: 2019-10-31

## 2019-10-31 ENCOUNTER — APPOINTMENT (OUTPATIENT)
Dept: LAB | Facility: HOSPITAL | Age: 84
End: 2019-10-31
Attending: INTERNAL MEDICINE
Payer: MEDICARE

## 2019-10-31 DIAGNOSIS — Z79.01 LONG TERM (CURRENT) USE OF ANTICOAGULANTS: ICD-10-CM

## 2019-10-31 DIAGNOSIS — Z79.01 LONG TERM (CURRENT) USE OF ANTICOAGULANTS: Primary | ICD-10-CM

## 2019-10-31 LAB
INR PPP: 1.38 (ref 0.9–1.5)
PROTHROMBIN TIME: 16.1 SECONDS (ref 10.2–13)

## 2019-10-31 PROCEDURE — 36415 COLL VENOUS BLD VENIPUNCTURE: CPT

## 2019-10-31 PROCEDURE — 85610 PROTHROMBIN TIME: CPT

## 2019-11-07 ENCOUNTER — TRANSCRIBE ORDERS (OUTPATIENT)
Dept: LAB | Facility: HOSPITAL | Age: 84
End: 2019-11-07

## 2019-11-07 ENCOUNTER — APPOINTMENT (OUTPATIENT)
Dept: LAB | Facility: HOSPITAL | Age: 84
End: 2019-11-07
Attending: INTERNAL MEDICINE
Payer: MEDICARE

## 2019-11-07 DIAGNOSIS — Z79.01 LONG TERM (CURRENT) USE OF ANTICOAGULANTS: ICD-10-CM

## 2019-11-07 DIAGNOSIS — Z79.01 LONG TERM (CURRENT) USE OF ANTICOAGULANTS: Primary | ICD-10-CM

## 2019-11-07 LAB
INR PPP: 2.21 (ref 0.9–1.5)
PROTHROMBIN TIME: 25.8 SECONDS (ref 10.2–13)

## 2019-11-07 PROCEDURE — 85610 PROTHROMBIN TIME: CPT

## 2019-11-07 PROCEDURE — 36415 COLL VENOUS BLD VENIPUNCTURE: CPT

## 2019-12-04 ENCOUNTER — TRANSCRIBE ORDERS (OUTPATIENT)
Dept: LAB | Facility: HOSPITAL | Age: 84
End: 2019-12-04

## 2019-12-04 ENCOUNTER — APPOINTMENT (OUTPATIENT)
Dept: LAB | Facility: HOSPITAL | Age: 84
End: 2019-12-04
Attending: INTERNAL MEDICINE
Payer: MEDICARE

## 2019-12-04 DIAGNOSIS — Z79.01 LONG TERM (CURRENT) USE OF ANTICOAGULANTS: ICD-10-CM

## 2019-12-04 DIAGNOSIS — Z79.01 LONG TERM (CURRENT) USE OF ANTICOAGULANTS: Primary | ICD-10-CM

## 2019-12-04 LAB
INR PPP: 2.65 (ref 0.9–1.5)
PROTHROMBIN TIME: 31.1 SECONDS (ref 10.2–13)

## 2019-12-04 PROCEDURE — 36415 COLL VENOUS BLD VENIPUNCTURE: CPT

## 2019-12-04 PROCEDURE — 85610 PROTHROMBIN TIME: CPT

## 2020-05-08 ENCOUNTER — TELEPHONE (OUTPATIENT)
Dept: SURGICAL ONCOLOGY | Facility: CLINIC | Age: 85
End: 2020-05-08

## (undated) DEVICE — VAC WHITEFOAM DRESSING LARGE: Brand: V.A.C. WHITEFOAM™

## (undated) DEVICE — TIBURON LAPAROTOMY DRAPE: Brand: CONVERTORS

## (undated) DEVICE — ELECTRODE NEEDLE E-Z CLEAN 6IN -0016

## (undated) DEVICE — ABDOMINAL PAD: Brand: DERMACEA

## (undated) DEVICE — X-RAY DETECTABLE SPONGES,16 PLY: Brand: VISTEC

## (undated) DEVICE — BETHLEHEM UNIVERSAL  ARTHRO PK: Brand: CARDINAL HEALTH

## (undated) DEVICE — CHLORAPREP HI-LITE 26ML ORANGE

## (undated) DEVICE — 3M™ STERI-DRAPE™ U-DRAPE 1015: Brand: STERI-DRAPE™

## (undated) DEVICE — SYRINGE 10ML LL

## (undated) DEVICE — SPECIMEN CONTAINER STERILE PEEL PACK

## (undated) DEVICE — ACE WRAP 4 IN UNSTERILE

## (undated) DEVICE — POOLE SUCTION HANDLE: Brand: CARDINAL HEALTH

## (undated) DEVICE — GLOVE SRG BIOGEL ECLIPSE 7

## (undated) DEVICE — NEEDLE 25G X 1 1/2

## (undated) DEVICE — GLOVE SRG BIOGEL 8

## (undated) DEVICE — 3M™ V.A.C.® GRANUFOAM™ DRESSING KIT, M8275052, MEDIUM: Brand: 3M™ V.A.C.® GRANUFOAM™

## (undated) DEVICE — TRANSPOSAL ULTRAFLEX DUO/QUAD ULTRA CART MANIFOLD

## (undated) DEVICE — COTTON BALLS: Brand: DEROYAL

## (undated) DEVICE — SPECIMEN TISSUE TRAP 12MM RIGID

## (undated) DEVICE — SYRINGE 20ML LL

## (undated) DEVICE — STRAP LITHOTOMY CANDY CANE

## (undated) DEVICE — SYRINGE 10ML LL CONTROL TOP

## (undated) DEVICE — ASTOUND STANDARD SURGICAL GOWN, XL: Brand: CONVERTORS

## (undated) DEVICE — SUT PLAIN 5-0 PC-1 18 IN 1915G

## (undated) DEVICE — READY WET SKIN SCRUB TRAY-LF: Brand: MEDLINE INDUSTRIES, INC.

## (undated) DEVICE — GLOVE INDICATOR UNDERGLOVE SZ 8 GREEN

## (undated) DEVICE — SPONGE LAP 18 X 18 IN

## (undated) DEVICE — CAUTERY TIP POLISHER: Brand: DEVON

## (undated) DEVICE — INTENDED FOR TISSUE SEPARATION, AND OTHER PROCEDURES THAT REQUIRE A SHARP SURGICAL BLADE TO PUNCTURE OR CUT.: Brand: BARD-PARKER ® CARBON RIB-BACK BLADES

## (undated) DEVICE — GLOVE SRG BIOGEL 6.5

## (undated) DEVICE — GLOVE PI ULTRA TOUCH SZ 6

## (undated) DEVICE — GLOVE SRG BIOGEL ECLIPSE 6.5

## (undated) DEVICE — SKIN MARKER DUAL TIP WITH RULER CAP, FLEXIBLE RULER AND LABELS: Brand: DEVON

## (undated) DEVICE — SUT VICRYL 3-0 SH 27 IN J416H

## (undated) DEVICE — AMBIENT COVAC 50 IFS: Brand: COBLATION

## (undated) DEVICE — GLOVE SRG BIOGEL 7

## (undated) DEVICE — DRAPE TOWEL: Brand: CONVERTORS

## (undated) DEVICE — GLOVE INDICATOR UNDERGLOVE SZ 6 BLUE

## (undated) DEVICE — VAC DRESSING SENSATRAC LRG

## (undated) DEVICE — CLEANER,CAUTERY TIP,2X2",STERILE: Brand: MEDLINE

## (undated) DEVICE — GLOVE INDICATOR UNDERGLOVE SZ 7 GREEN

## (undated) DEVICE — PREP SURGICAL PURPREP 26ML

## (undated) DEVICE — PENCIL ELECTROSURG E-Z CLEAN -0035H

## (undated) DEVICE — VAC CANISTER 500ML

## (undated) DEVICE — COBAN 6 IN STERILE

## (undated) DEVICE — UNDYED MONOFILAMENT (POLYDIOXANONE), ABSORBABLE SURGICAL SUTURE: Brand: PDS

## (undated) DEVICE — SUT SILK 2-0 SH 30 IN K833H

## (undated) DEVICE — DRAPE EQUIPMENT RF WAND

## (undated) DEVICE — GLOVE INDICATOR UNDERGLOVE SZ 7.5 GREEN

## (undated) DEVICE — PAD CAST 6 IN COTTON NON STERILE

## (undated) DEVICE — STANDARD SURGICAL GOWN, L: Brand: CONVERTORS

## (undated) DEVICE — ELECTRODE NEEDLE MOD E-Z CLEAN 2.75IN 7CM -0013M

## (undated) DEVICE — SUT ETHILON 3-0 PS-1 18 IN 1663H

## (undated) DEVICE — DRESSING XEROFORM 5 X 9

## (undated) DEVICE — GLOVE SRG BIOGEL 7.5

## (undated) DEVICE — 10FR FRAZIER SUCTION HANDLE: Brand: CARDINAL HEALTH

## (undated) DEVICE — GLOVE INDICATOR PI UNDERGLOVE SZ 7.5 BLUE

## (undated) DEVICE — SUT MONOCRYL 4-0 PS-2 27 IN Y426H

## (undated) DEVICE — LIGACLIP MCA MULTIPLE CLIP APPLIERS, 20 SMALL CLIPS: Brand: LIGACLIP

## (undated) DEVICE — DRAPE PROBE NEO-PROBE/ULTRASOUND

## (undated) DEVICE — Device

## (undated) DEVICE — STERLING XTRASHARP SHAVER GREAT WHITE SHAVER BLADE, 4.2 MM: Brand: STERLING XTRASHARP SHAVER GREAT WHITE

## (undated) DEVICE — GLOVE INDICATOR PI UNDERGLOVE SZ 7 BLUE

## (undated) DEVICE — BULB SYRINGE,IRRIGATION WITH PROTECTIVE CAP: Brand: DOVER

## (undated) DEVICE — SUT STRATAFIX SPIRAL MONOCRYL PLUS 4-0 PS-2 30CM SXMP1B117

## (undated) DEVICE — 2000CC GUARDIAN II: Brand: GUARDIAN

## (undated) DEVICE — THE SIMPULSE SOLO SYSTEM WITH ULTREX RETRACTABLE SPLASH SHIELD TIP: Brand: SIMPULSE SOLO

## (undated) DEVICE — TOWEL SURG XR DETECT GREEN STRL RFD

## (undated) DEVICE — PREP PAD BNS: Brand: CONVERTORS

## (undated) DEVICE — ASTOUND IMPERVIOUS SURGICAL GOWN: Brand: CONVERTORS

## (undated) DEVICE — SCD SEQUENTIAL COMPRESSION COMFORT SLEEVE MEDIUM KNEE LENGTH: Brand: KENDALL SCD

## (undated) DEVICE — ADHESIVE SKN CLSR HISTOACRYL FLEX 0.5ML LF

## (undated) DEVICE — ACE WRAP 4 IN STERILE

## (undated) DEVICE — ASTOUND STANDARD SURGICAL GOWN, XXL: Brand: CONVERTORS

## (undated) DEVICE — BETHLEHEM UNIVERSAL MINOR GEN: Brand: CARDINAL HEALTH

## (undated) DEVICE — KERLIX BANDAGE ROLL: Brand: KERLIX

## (undated) DEVICE — SUT STRATAFIX SPIRAL 3-0 PGA/PCL 30 X 30 CM SXMD2B408

## (undated) DEVICE — CARDIOVASCULAR SPLIT DRAPE: Brand: CONVERTORS

## (undated) DEVICE — PENCIL ROCKER SWITCH CAUTERY HAND CONTROL

## (undated) DEVICE — CUP MEDICINE 1OZ 5000/CS 50/PLT: Brand: MEDEGEN MEDICAL PRODUCTS, LLC

## (undated) DEVICE — SUT ETHILON 3-0 PS-1 18 IN 1663G

## (undated) DEVICE — SPONGE STICK WITH PVP-I: Brand: KENDALL

## (undated) DEVICE — PLUMEPEN PRO 10FT